# Patient Record
Sex: FEMALE | Race: WHITE | NOT HISPANIC OR LATINO | Employment: UNEMPLOYED
[De-identification: names, ages, dates, MRNs, and addresses within clinical notes are randomized per-mention and may not be internally consistent; named-entity substitution may affect disease eponyms.]

---

## 2023-12-26 ENCOUNTER — TRANSCRIBE ORDERS (OUTPATIENT)
Dept: OTHER | Age: 63
End: 2023-12-26

## 2023-12-26 ENCOUNTER — MEDICAL CORRESPONDENCE (OUTPATIENT)
Dept: HEALTH INFORMATION MANAGEMENT | Facility: CLINIC | Age: 63
End: 2023-12-26

## 2023-12-26 ENCOUNTER — PATIENT OUTREACH (OUTPATIENT)
Dept: ONCOLOGY | Facility: CLINIC | Age: 63
End: 2023-12-26

## 2023-12-26 DIAGNOSIS — R25.0 ABNORMAL HEAD MOVEMENTS: ICD-10-CM

## 2023-12-26 DIAGNOSIS — R26.2 AMBULATORY DYSFUNCTION: ICD-10-CM

## 2023-12-26 DIAGNOSIS — C43.59: Primary | ICD-10-CM

## 2023-12-26 DIAGNOSIS — R42 LIGHTHEADEDNESS: ICD-10-CM

## 2023-12-26 DIAGNOSIS — R63.4 WEIGHT LOSS: ICD-10-CM

## 2023-12-26 DIAGNOSIS — R25.1 TREMOR: ICD-10-CM

## 2023-12-26 DIAGNOSIS — H51.9 ABNORMAL EYE MOVEMENTS: ICD-10-CM

## 2023-12-26 NOTE — PROGRESS NOTES
"New Patient Hematology / Oncology Nurse Navigator Note     Referral Date: 12/26/23    Referring provider:   Yenny Delcid DO   [address] [address]   Phone: [phone]   Fax: [phone]     Referring Clinic/Organization: [facility]      Referred to: Neuro Oncology    Requested provider (if applicable): Dr. Shaver    Evaluation for : \"h/o malignant melanoma almost completing 1 year course of Opdivo; developed vertigo, dizziness, abnormal eye and head movements, tremor, ambulatory dysfunction, sx continue despite cessation of Opdivo, w/u nonspecific for etiology, concern for immune-related adverse effect of immunotherapy\"     Clinical History (per Nurse review of records provided):    12/22/23 Office Visit with Neurology at [facility] (referring) -- BOOKMARKED  10/12/23 MRI Brain:  FINDINGS: No acute infarct, hemorrhage, intracranial mass, or abnormal enhancement. Incidental benign left cerebellar DVA. Trace leukoaraiosis. No findings to explain double vision or dizziness.   IMPRESSION: No intracranial metastases.  -- BOOKMARKED  12/14/23 LP op note/results -- BOOKMARKED    Clinical Assessment / Barriers to Care (Per Nurse):  Pt lives in [facility]     Records Location: Care Everywhere     Records Needed:   Records from [facility] per protocol    Additional testing needed prior to consult:   N/A    Referral updates and Plan:   Will review referral with Dr. Shaver to advise if most appropriate for Neuro Onc, Neurology, or Medical Oncology. Will follow-up pending input from Dr. Shaver.     OUTGOING CALL to pt, no answer.     LVM introducing my role as nurse navigator with [facility] and that we have recd the referral from [facility] requesting 2nd opinion.     Explained to pt that Nurse Navigator will review referral with the team then follow-up with further recommendations. Provided my direct call-back number if any questions in the meantime.        Anne Hall, BSN, RN, PHN, " OCN  Hematology/Oncology Nurse Navigator  [facility]  [phone]

## 2023-12-27 ENCOUNTER — PRE VISIT (OUTPATIENT)
Dept: ONCOLOGY | Facility: CLINIC | Age: 63
End: 2023-12-27

## 2023-12-27 NOTE — TELEPHONE ENCOUNTER
RECORDS STATUS - ALL OTHER DIAGNOSIS      Action    Action Taken 23  -LVM for pt re: [facility]Care Everywhere Authorization.  -Records from [facility] requested via fax.  9:53 AM    Pt called back & advised they have never been to [facility]. Pt advised that they were referred to [facility] at the same time, and [facility] contacted her first/got her scheduled, [facility] never contacted pt.     Pt provided verbal authorization for CE pull. Pt advised that all of her imaging, procedures, treatments, and records are at [facility].  10:03 AM    Imaging from [facility] resolved to PACS  3:36 PM       RECORDS RECEIVED FROM: [facility]   DATE RECEIVED:    NOTES STATUS DETAILS   OFFICE NOTE from referring provider [facility] Dr. Kailey Davis   OFFICE NOTE from medical oncologist [facility] Dr. Davis: 23   DISCHARGE SUMMARY from hospital [facility] 10/11/22, 9/10/22   DISCHARGE REPORT from the ER [facility] 23, 10/9/23, 23, 23   OPERATIVE REPORT [facility] 23: Puncture Lumbar  22: Excision Wide Melanoma   MEDICATION LIST [facility]   LABS     PATHOLOGY REPORTS [facility], Reports in CE, slides requested   [employer] 22: [id number]  6/3/22: [id number]   ANYTHING RELATED TO DIAGNOSIS Epic/CE 23   IMAGING (NEED IMAGES & REPORT)     CT SCANS PACS 10/9/23: [facility]   MRI PACS 10/12/23: [facility]   MRI PACS 23, 23, 10/14/22: [facility]   ULTRASOUND PACS 10/13/22: [facility]   CT PACS 23, 23, 3/6/23, 22, 10/14/22, 10/13/22, 10/11/22, 9/10/22: [facility]   PET PACS 7/15/22: [facility]

## 2023-12-28 NOTE — PROGRESS NOTES
Reviewed with Dr. Shaver who is happy to see patient.     OUTGOING CALL to pt:  Introduced my role as nurse navigator with [facility] Hematology/Oncology dept and that we have recd the referral to Dr. Shaver from from Dr Delcid.  Pt confirms they are aware of the referral and ready to schedule.  Provided contact information if future questions arise.     -Transferred pt to NPS line [phone] to schedule appt per scheduling instructions.    Anne Hall, ROULAN, RN, PHN, OCN  Hematology/Oncology Nurse Navigator  [facility]  [phone]

## 2024-01-08 ENCOUNTER — TUMOR CONFERENCE (OUTPATIENT)
Dept: ONCOLOGY | Facility: CLINIC | Age: 64
End: 2024-01-08
Payer: COMMERCIAL

## 2024-01-09 ENCOUNTER — APPOINTMENT (OUTPATIENT)
Dept: CT IMAGING | Facility: CLINIC | Age: 64
End: 2024-01-09
Attending: EMERGENCY MEDICINE
Payer: COMMERCIAL

## 2024-01-09 ENCOUNTER — ONCOLOGY VISIT (OUTPATIENT)
Dept: ONCOLOGY | Facility: CLINIC | Age: 64
End: 2024-01-09
Attending: PSYCHIATRY & NEUROLOGY
Payer: COMMERCIAL

## 2024-01-09 ENCOUNTER — APPOINTMENT (OUTPATIENT)
Dept: MRI IMAGING | Facility: CLINIC | Age: 64
End: 2024-01-09
Payer: COMMERCIAL

## 2024-01-09 ENCOUNTER — APPOINTMENT (OUTPATIENT)
Dept: GENERAL RADIOLOGY | Facility: CLINIC | Age: 64
End: 2024-01-09
Attending: EMERGENCY MEDICINE
Payer: COMMERCIAL

## 2024-01-09 ENCOUNTER — PATIENT OUTREACH (OUTPATIENT)
Dept: ONCOLOGY | Facility: CLINIC | Age: 64
End: 2024-01-09
Payer: COMMERCIAL

## 2024-01-09 ENCOUNTER — HOSPITAL ENCOUNTER (INPATIENT)
Facility: CLINIC | Age: 64
LOS: 9 days | Discharge: NURSING, REHAB OR LONG-TERM CARE | End: 2024-01-18
Attending: EMERGENCY MEDICINE | Admitting: STUDENT IN AN ORGANIZED HEALTH CARE EDUCATION/TRAINING PROGRAM
Payer: COMMERCIAL

## 2024-01-09 VITALS
OXYGEN SATURATION: 98 % | SYSTOLIC BLOOD PRESSURE: 145 MMHG | RESPIRATION RATE: 16 BRPM | TEMPERATURE: 98.4 F | WEIGHT: 104 LBS | DIASTOLIC BLOOD PRESSURE: 96 MMHG | HEART RATE: 119 BPM

## 2024-01-09 DIAGNOSIS — G25.9 MOVEMENT DISORDER: ICD-10-CM

## 2024-01-09 DIAGNOSIS — R53.1 GENERALIZED WEAKNESS: ICD-10-CM

## 2024-01-09 DIAGNOSIS — Z29.89 NEED FOR PNEUMOCYSTIS PROPHYLAXIS: ICD-10-CM

## 2024-01-09 DIAGNOSIS — R25.1 SHAKING: ICD-10-CM

## 2024-01-09 DIAGNOSIS — M62.89 MUSCLE STIFFNESS: ICD-10-CM

## 2024-01-09 DIAGNOSIS — R26.2 UNABLE TO AMBULATE: ICD-10-CM

## 2024-01-09 DIAGNOSIS — Z29.89 OSTEOPOROSIS PROPHYLAXIS: ICD-10-CM

## 2024-01-09 DIAGNOSIS — F41.9 ANXIETY: Primary | ICD-10-CM

## 2024-01-09 DIAGNOSIS — G25.9 MOVEMENT DISORDER: Primary | ICD-10-CM

## 2024-01-09 PROBLEM — R42 VERTIGO: Status: ACTIVE | Noted: 2024-01-09

## 2024-01-09 LAB
ALBUMIN SERPL BCG-MCNC: 4.5 G/DL (ref 3.5–5.2)
ALBUMIN UR-MCNC: NEGATIVE MG/DL
ALP SERPL-CCNC: 49 U/L (ref 40–150)
ALT SERPL W P-5'-P-CCNC: 13 U/L (ref 0–50)
ANION GAP SERPL CALCULATED.3IONS-SCNC: 14 MMOL/L (ref 7–15)
APPEARANCE UR: CLEAR
AST SERPL W P-5'-P-CCNC: 14 U/L (ref 0–45)
ATRIAL RATE - MUSE: 108 BPM
BACTERIA #/AREA URNS HPF: ABNORMAL /HPF
BASOPHILS # BLD AUTO: 0 10E3/UL (ref 0–0.2)
BASOPHILS NFR BLD AUTO: 0 %
BILIRUB SERPL-MCNC: 0.3 MG/DL
BILIRUB UR QL STRIP: NEGATIVE
BUN SERPL-MCNC: 11.2 MG/DL (ref 8–23)
CALCIUM SERPL-MCNC: 9.6 MG/DL (ref 8.8–10.2)
CHLORIDE SERPL-SCNC: 105 MMOL/L (ref 98–107)
COLOR UR AUTO: ABNORMAL
CREAT SERPL-MCNC: 0.92 MG/DL (ref 0.51–0.95)
DEPRECATED HCO3 PLAS-SCNC: 19 MMOL/L (ref 22–29)
DIASTOLIC BLOOD PRESSURE - MUSE: NORMAL MMHG
EGFRCR SERPLBLD CKD-EPI 2021: 70 ML/MIN/1.73M2
EOSINOPHIL # BLD AUTO: 0.1 10E3/UL (ref 0–0.7)
EOSINOPHIL NFR BLD AUTO: 1 %
ERYTHROCYTE [DISTWIDTH] IN BLOOD BY AUTOMATED COUNT: 13.1 % (ref 10–15)
FLUAV RNA SPEC QL NAA+PROBE: NEGATIVE
FLUBV RNA RESP QL NAA+PROBE: NEGATIVE
GLUCOSE BLDC GLUCOMTR-MCNC: 106 MG/DL (ref 70–99)
GLUCOSE SERPL-MCNC: 102 MG/DL (ref 70–99)
GLUCOSE UR STRIP-MCNC: NEGATIVE MG/DL
HCT VFR BLD AUTO: 37.2 % (ref 35–47)
HGB BLD-MCNC: 12.6 G/DL (ref 11.7–15.7)
HGB UR QL STRIP: NEGATIVE
HOLD SPECIMEN: NORMAL
IMM GRANULOCYTES # BLD: 0.1 10E3/UL
IMM GRANULOCYTES NFR BLD: 1 %
INTERPRETATION ECG - MUSE: NORMAL
KETONES UR STRIP-MCNC: NEGATIVE MG/DL
LACTATE SERPL-SCNC: 2.2 MMOL/L (ref 0.7–2)
LACTATE SERPL-SCNC: 4.1 MMOL/L (ref 0.7–2)
LEUKOCYTE ESTERASE UR QL STRIP: NEGATIVE
LYMPHOCYTES # BLD AUTO: 2.2 10E3/UL (ref 0.8–5.3)
LYMPHOCYTES NFR BLD AUTO: 18 %
MAGNESIUM SERPL-MCNC: 2.2 MG/DL (ref 1.7–2.3)
MCH RBC QN AUTO: 31 PG (ref 26.5–33)
MCHC RBC AUTO-ENTMCNC: 33.9 G/DL (ref 31.5–36.5)
MCV RBC AUTO: 92 FL (ref 78–100)
MONOCYTES # BLD AUTO: 0.9 10E3/UL (ref 0–1.3)
MONOCYTES NFR BLD AUTO: 7 %
MUCOUS THREADS #/AREA URNS LPF: PRESENT /LPF
NEUTROPHILS # BLD AUTO: 8.8 10E3/UL (ref 1.6–8.3)
NEUTROPHILS NFR BLD AUTO: 73 %
NITRATE UR QL: NEGATIVE
NRBC # BLD AUTO: 0 10E3/UL
NRBC BLD AUTO-RTO: 0 /100
P AXIS - MUSE: 85 DEGREES
PH UR STRIP: 6.5 [PH] (ref 5–7)
PLATELET # BLD AUTO: 353 10E3/UL (ref 150–450)
POTASSIUM SERPL-SCNC: 3.5 MMOL/L (ref 3.4–5.3)
PR INTERVAL - MUSE: 150 MS
PROT SERPL-MCNC: 7.2 G/DL (ref 6.4–8.3)
QRS DURATION - MUSE: 70 MS
QT - MUSE: 344 MS
QTC - MUSE: 460 MS
R AXIS - MUSE: 80 DEGREES
RBC # BLD AUTO: 4.06 10E6/UL (ref 3.8–5.2)
RBC URINE: 1 /HPF
RSV RNA SPEC NAA+PROBE: NEGATIVE
SARS-COV-2 RNA RESP QL NAA+PROBE: NEGATIVE
SODIUM SERPL-SCNC: 138 MMOL/L (ref 135–145)
SP GR UR STRIP: 1.01 (ref 1–1.03)
SQUAMOUS EPITHELIAL: <1 /HPF
SYSTOLIC BLOOD PRESSURE - MUSE: NORMAL MMHG
T AXIS - MUSE: 61 DEGREES
TRANSITIONAL EPI: <1 /HPF
TROPONIN T SERPL HS-MCNC: 8 NG/L
TSH SERPL DL<=0.005 MIU/L-ACNC: 3.34 UIU/ML (ref 0.3–4.2)
UROBILINOGEN UR STRIP-MCNC: NORMAL MG/DL
VENTRICULAR RATE- MUSE: 108 BPM
WBC # BLD AUTO: 12.1 10E3/UL (ref 4–11)
WBC URINE: 1 /HPF

## 2024-01-09 PROCEDURE — 93005 ELECTROCARDIOGRAM TRACING: CPT | Performed by: EMERGENCY MEDICINE

## 2024-01-09 PROCEDURE — 99223 1ST HOSP IP/OBS HIGH 75: CPT | Mod: GC | Performed by: STUDENT IN AN ORGANIZED HEALTH CARE EDUCATION/TRAINING PROGRAM

## 2024-01-09 PROCEDURE — 99213 OFFICE O/P EST LOW 20 MIN: CPT | Performed by: PSYCHIATRY & NEUROLOGY

## 2024-01-09 PROCEDURE — 85025 COMPLETE CBC W/AUTO DIFF WBC: CPT | Performed by: EMERGENCY MEDICINE

## 2024-01-09 PROCEDURE — 81001 URINALYSIS AUTO W/SCOPE: CPT | Performed by: EMERGENCY MEDICINE

## 2024-01-09 PROCEDURE — 70450 CT HEAD/BRAIN W/O DYE: CPT

## 2024-01-09 PROCEDURE — 250N000012 HC RX MED GY IP 250 OP 636 PS 637

## 2024-01-09 PROCEDURE — 36415 COLL VENOUS BLD VENIPUNCTURE: CPT | Performed by: EMERGENCY MEDICINE

## 2024-01-09 PROCEDURE — 99205 OFFICE O/P NEW HI 60 MIN: CPT | Performed by: PSYCHIATRY & NEUROLOGY

## 2024-01-09 PROCEDURE — 82962 GLUCOSE BLOOD TEST: CPT

## 2024-01-09 PROCEDURE — 84484 ASSAY OF TROPONIN QUANT: CPT | Performed by: EMERGENCY MEDICINE

## 2024-01-09 PROCEDURE — 250N000013 HC RX MED GY IP 250 OP 250 PS 637

## 2024-01-09 PROCEDURE — 80053 COMPREHEN METABOLIC PANEL: CPT | Performed by: EMERGENCY MEDICINE

## 2024-01-09 PROCEDURE — 120N000002 HC R&B MED SURG/OB UMMC

## 2024-01-09 PROCEDURE — G0463 HOSPITAL OUTPT CLINIC VISIT: HCPCS | Performed by: PSYCHIATRY & NEUROLOGY

## 2024-01-09 PROCEDURE — 99417 PROLNG OP E/M EACH 15 MIN: CPT | Performed by: PSYCHIATRY & NEUROLOGY

## 2024-01-09 PROCEDURE — 93010 ELECTROCARDIOGRAM REPORT: CPT | Performed by: EMERGENCY MEDICINE

## 2024-01-09 PROCEDURE — A9585 GADOBUTROL INJECTION: HCPCS | Performed by: EMERGENCY MEDICINE

## 2024-01-09 PROCEDURE — 70450 CT HEAD/BRAIN W/O DYE: CPT | Mod: 26 | Performed by: RADIOLOGY

## 2024-01-09 PROCEDURE — 70553 MRI BRAIN STEM W/O & W/DYE: CPT

## 2024-01-09 PROCEDURE — 255N000002 HC RX 255 OP 636: Performed by: EMERGENCY MEDICINE

## 2024-01-09 PROCEDURE — 71046 X-RAY EXAM CHEST 2 VIEWS: CPT | Mod: 26 | Performed by: RADIOLOGY

## 2024-01-09 PROCEDURE — 83735 ASSAY OF MAGNESIUM: CPT | Performed by: EMERGENCY MEDICINE

## 2024-01-09 PROCEDURE — 87637 SARSCOV2&INF A&B&RSV AMP PRB: CPT | Performed by: EMERGENCY MEDICINE

## 2024-01-09 PROCEDURE — 99285 EMERGENCY DEPT VISIT HI MDM: CPT | Mod: 25 | Performed by: EMERGENCY MEDICINE

## 2024-01-09 PROCEDURE — 84443 ASSAY THYROID STIM HORMONE: CPT | Performed by: EMERGENCY MEDICINE

## 2024-01-09 PROCEDURE — 72156 MRI NECK SPINE W/O & W/DYE: CPT | Mod: 26 | Performed by: RADIOLOGY

## 2024-01-09 PROCEDURE — 83605 ASSAY OF LACTIC ACID: CPT | Performed by: EMERGENCY MEDICINE

## 2024-01-09 PROCEDURE — 70553 MRI BRAIN STEM W/O & W/DYE: CPT | Mod: 26 | Performed by: RADIOLOGY

## 2024-01-09 PROCEDURE — 72156 MRI NECK SPINE W/O & W/DYE: CPT

## 2024-01-09 PROCEDURE — 71046 X-RAY EXAM CHEST 2 VIEWS: CPT

## 2024-01-09 PROCEDURE — 258N000003 HC RX IP 258 OP 636

## 2024-01-09 RX ORDER — AMOXICILLIN 250 MG
2 CAPSULE ORAL 2 TIMES DAILY PRN
Status: DISCONTINUED | OUTPATIENT
Start: 2024-01-09 | End: 2024-01-13

## 2024-01-09 RX ORDER — LIDOCAINE 40 MG/G
CREAM TOPICAL
Status: DISCONTINUED | OUTPATIENT
Start: 2024-01-09 | End: 2024-01-18 | Stop reason: HOSPADM

## 2024-01-09 RX ORDER — MECLIZINE HYDROCHLORIDE 25 MG/1
25 TABLET ORAL 2 TIMES DAILY PRN
Status: ON HOLD | COMMUNITY
Start: 2023-08-09 | End: 2024-01-22

## 2024-01-09 RX ORDER — PANTOPRAZOLE SODIUM 40 MG/1
40 TABLET, DELAYED RELEASE ORAL
Status: DISCONTINUED | OUTPATIENT
Start: 2024-01-10 | End: 2024-01-18 | Stop reason: HOSPADM

## 2024-01-09 RX ORDER — LIDOCAINE/PRILOCAINE 2.5 %-2.5%
CREAM (GRAM) TOPICAL
Status: ON HOLD | COMMUNITY
Start: 2022-12-08 | End: 2024-01-16

## 2024-01-09 RX ORDER — PROCHLORPERAZINE MALEATE 5 MG
10 TABLET ORAL EVERY 6 HOURS PRN
Status: DISCONTINUED | OUTPATIENT
Start: 2024-01-09 | End: 2024-01-18 | Stop reason: HOSPADM

## 2024-01-09 RX ORDER — LEVOTHYROXINE SODIUM 50 UG/1
1 TABLET ORAL DAILY
Status: ON HOLD | COMMUNITY
Start: 2023-07-06 | End: 2024-01-22

## 2024-01-09 RX ORDER — ONDANSETRON 4 MG/1
8 TABLET, ORALLY DISINTEGRATING ORAL EVERY 8 HOURS PRN
Status: ON HOLD | COMMUNITY
Start: 2023-09-14 | End: 2024-01-22

## 2024-01-09 RX ORDER — CITALOPRAM HYDROBROMIDE 20 MG/1
40 TABLET ORAL DAILY
Status: DISCONTINUED | OUTPATIENT
Start: 2024-01-09 | End: 2024-01-14

## 2024-01-09 RX ORDER — POTASSIUM CHLORIDE 750 MG/1
10 TABLET, EXTENDED RELEASE ORAL 2 TIMES DAILY
Status: ON HOLD | COMMUNITY
Start: 2023-02-16 | End: 2024-01-25

## 2024-01-09 RX ORDER — ONDANSETRON 4 MG/1
4 TABLET, ORALLY DISINTEGRATING ORAL EVERY 8 HOURS PRN
Status: DISCONTINUED | OUTPATIENT
Start: 2024-01-09 | End: 2024-01-18 | Stop reason: HOSPADM

## 2024-01-09 RX ORDER — METOCLOPRAMIDE 10 MG/1
10 TABLET ORAL EVERY 6 HOURS PRN
Status: ON HOLD | COMMUNITY
Start: 2023-10-09 | End: 2024-01-22

## 2024-01-09 RX ORDER — GABAPENTIN 300 MG/1
300 CAPSULE ORAL AT BEDTIME
Status: DISCONTINUED | OUTPATIENT
Start: 2024-01-09 | End: 2024-01-18 | Stop reason: HOSPADM

## 2024-01-09 RX ORDER — ACETAMINOPHEN 325 MG/1
975 TABLET ORAL 2 TIMES DAILY PRN
Status: DISCONTINUED | OUTPATIENT
Start: 2024-01-09 | End: 2024-01-16

## 2024-01-09 RX ORDER — MECLIZINE HYDROCHLORIDE 25 MG/1
25 TABLET ORAL 2 TIMES DAILY PRN
Status: DISCONTINUED | OUTPATIENT
Start: 2024-01-09 | End: 2024-01-18 | Stop reason: HOSPADM

## 2024-01-09 RX ORDER — GABAPENTIN 100 MG/1
300 CAPSULE ORAL AT BEDTIME
Status: ON HOLD | COMMUNITY
Start: 2023-12-22 | End: 2024-01-16

## 2024-01-09 RX ORDER — FOLIC ACID 1 MG/1
1 TABLET ORAL DAILY
Status: ON HOLD | COMMUNITY
Start: 2023-11-14 | End: 2024-01-22

## 2024-01-09 RX ORDER — DIAZEPAM 5 MG
5 TABLET ORAL EVERY 8 HOURS PRN
COMMUNITY
Start: 2023-08-09 | End: 2024-01-10

## 2024-01-09 RX ORDER — PREDNISONE 10 MG/1
1 TABLET ORAL DAILY
COMMUNITY
Start: 2023-09-05 | End: 2024-01-09

## 2024-01-09 RX ORDER — CALCIUM CARBONATE 500 MG/1
1000 TABLET, CHEWABLE ORAL 4 TIMES DAILY PRN
Status: DISCONTINUED | OUTPATIENT
Start: 2024-01-09 | End: 2024-01-18 | Stop reason: HOSPADM

## 2024-01-09 RX ORDER — ACETAMINOPHEN 500 MG
500-1000 TABLET ORAL EVERY 6 HOURS PRN
COMMUNITY

## 2024-01-09 RX ORDER — PROCHLORPERAZINE MALEATE 10 MG
10 TABLET ORAL EVERY 6 HOURS PRN
Status: ON HOLD | COMMUNITY
Start: 2023-08-01 | End: 2024-01-16

## 2024-01-09 RX ORDER — PREDNISONE 5 MG/1
5 TABLET ORAL EVERY MORNING
Status: DISCONTINUED | OUTPATIENT
Start: 2024-01-09 | End: 2024-01-18

## 2024-01-09 RX ORDER — METOCLOPRAMIDE 5 MG/1
10 TABLET ORAL EVERY 6 HOURS PRN
Status: DISCONTINUED | OUTPATIENT
Start: 2024-01-09 | End: 2024-01-18 | Stop reason: HOSPADM

## 2024-01-09 RX ORDER — GADOBUTROL 604.72 MG/ML
7.5 INJECTION INTRAVENOUS ONCE
Status: COMPLETED | OUTPATIENT
Start: 2024-01-09 | End: 2024-01-09

## 2024-01-09 RX ORDER — LEVOTHYROXINE SODIUM 50 UG/1
50 TABLET ORAL DAILY
Status: DISCONTINUED | OUTPATIENT
Start: 2024-01-09 | End: 2024-01-18 | Stop reason: HOSPADM

## 2024-01-09 RX ORDER — CITALOPRAM HYDROBROMIDE 40 MG/1
1 TABLET ORAL DAILY
Status: ON HOLD | COMMUNITY
Start: 2023-07-24 | End: 2024-01-16

## 2024-01-09 RX ORDER — PREDNISONE 5 MG/1
1 TABLET ORAL EVERY MORNING
Status: ON HOLD | COMMUNITY
Start: 2023-02-28 | End: 2024-01-17

## 2024-01-09 RX ORDER — AMOXICILLIN 250 MG
1 CAPSULE ORAL 2 TIMES DAILY PRN
Status: DISCONTINUED | OUTPATIENT
Start: 2024-01-09 | End: 2024-01-13

## 2024-01-09 RX ADMIN — PREDNISONE 5 MG: 5 TABLET ORAL at 22:51

## 2024-01-09 RX ADMIN — GABAPENTIN 300 MG: 300 CAPSULE ORAL at 22:51

## 2024-01-09 RX ADMIN — ACETAMINOPHEN 975 MG: 325 TABLET, FILM COATED ORAL at 18:13

## 2024-01-09 RX ADMIN — GADOBUTROL 5 ML: 604.72 INJECTION INTRAVENOUS at 21:15

## 2024-01-09 RX ADMIN — CITALOPRAM 40 MG: 40 TABLET ORAL at 22:51

## 2024-01-09 RX ADMIN — LEVOTHYROXINE SODIUM 50 MCG: 0.05 TABLET ORAL at 22:52

## 2024-01-09 RX ADMIN — SODIUM CHLORIDE, POTASSIUM CHLORIDE, SODIUM LACTATE AND CALCIUM CHLORIDE 1000 ML: 600; 310; 30; 20 INJECTION, SOLUTION INTRAVENOUS at 22:52

## 2024-01-09 ASSESSMENT — ACTIVITIES OF DAILY LIVING (ADL)
ADLS_ACUITY_SCORE: 35

## 2024-01-09 ASSESSMENT — PAIN SCALES - GENERAL: PAINLEVEL: SEVERE PAIN (6)

## 2024-01-09 NOTE — PROGRESS NOTES
[facility]: Cancer Care                                                                                          Patient was seen in the clinic today by Dr. Shaver who referred them to the ER dept at the [facility] to be examined by general neuro whom is expecting her.   She is accompanied by her significant other, Kyle.   She is w/c bound.     [facility] notified of patient's arrival    Signature:  Claribel Jauregui RN

## 2024-01-09 NOTE — LETTER
1/9/2024         RE: Shayy Cortez  [address]  [place] [address]        Dear Colleague,    Thank you for referring your patient, Shayy Cortez, to the [facility]. Please see a copy of my visit note below.    Oncology Rooming Note    January 9, 2024 10:23 AM   Shayy Cortez is a 63 year old female who presents for:    Chief Complaint   Patient presents with     Oncology Clinic Visit     Initial Vitals: BP (!) 155/99 (BP Location: Left arm, Patient Position: Sitting, Cuff Size: Adult Regular)   Pulse (!) 123   Temp 98.4  F (36.9  C) (Oral)   Resp 16   Wt 47.2 kg (104 lb)   SpO2 98%  There is no height or weight on file to calculate BMI. There is no height or weight on file to calculate BSA.  Severe Pain (6) Comment: Data Unavailable   No LMP recorded.  Allergies reviewed: Yes  Medications reviewed: Yes    Medications: Medication refills not needed today.  Pharmacy name entered into EPIC: Data Unavailable    Frailty Screening:   Is the patient here for a new oncology consult visit in cancer care? 1. Yes. Over the past month, have you experienced difficulty or required a caregiver to assist with:   1. Balance, walking or general mobility (including any falls)? YES  2. Completion of self-care tasks such as bathing, dressing, toileting, grooming/hygiene?  YES  3. Concentration or memory that affects your daily life?  YES       Clinical concerns: no        Lizz Reynoso CMA                NEURO-ONCOLOGY INITIAL VISIT  Jan 9, 2024    CHIEF COMPLAINT: Ms. Shayy Cortez is a 63 year old right-handed woman with melanoma, who completed a year's treatment with nivolumab and over the course of the past 3-5 months has developed progressive tremor, vertigo, abnormal eye movements, and ambulatory dysfunction. These symptoms continued despite stopping immunotherapy plus use of steroids. She is presenting to this initial clinic visit for evaluation and is accompanied by Ever  (significant other).     HISTORY OF PRESENT ILLNESS  -In October 2023, endorsed for several month history of headache, dizziness, and gait instability with new issues related to vision changes.    Worsening dizziness with recurrence of vertigo in the last 2-3 weeks.    Worsening tremor in the arms and legs plus whole body jerks.    Square wave jerks of the eyes with reduced visual acuity.    Legs feel stiff and weak. Previously was walking with a cane, then a walker, and now, she can no longer walk due to balance instability, leg stiffening, and the intensity of tremor.    -Headaches occur several times a week. Can be located around bilateral temporal areas and occipital areas. Severity rating 4/10. Described dull, irritating pain. Complains of neck pain often. Tylenol does give relief.    -She endorses mild confusion. Denies dysarthria.    -Reported constipation. Usually takes stool softener. Has tried senna or miralax without relief.    Denies urinary/bowel incontinence.    Has intermittent nausea. No dysphagia.  -With prednisone + gabapentin there has been no improvement.  -New onset of acting out her dreams.    No other periods of movement of arms without her knowledge.       MEDICATIONS   Current Outpatient Medications   Medication Sig Dispense Refill     cholecalciferol 50 MCG (2000 UT) CAPS Take 50 mcg by mouth       citalopram (CELEXA) 40 MG tablet Take 1 tablet by mouth daily       diazepam (VALIUM) 5 MG tablet Take 5 mg by mouth       folic acid (FOLVITE) 1 MG tablet Take 1 tablet by mouth daily       gabapentin (NEURONTIN) 100 MG capsule Take 300 mg by mouth       levothyroxine (SYNTHROID/LEVOTHROID) 50 MCG tablet Take 1 tablet by mouth daily       lidocaine-prilocaine (EMLA) 2.5-2.5 % external cream Apply to port area 1-2 hours prior to needle access. Cover with occlusive dressing unit procedure.       meclizine (ANTIVERT) 25 MG tablet Take 25 mg by mouth       metoclopramide (REGLAN) 10 MG tablet Take  10 mg by mouth       omeprazole (PRILOSEC) 20 MG DR capsule Take 20 mg by mouth       ondansetron (ZOFRAN ODT) 4 MG ODT tab Place 8 mg under the tongue       potassium chloride ER (K-TAB/KLOR-CON) 10 MEQ CR tablet Take 10 mEq by mouth       predniSONE (DELTASONE) 10 MG tablet Take 1 tablet by mouth daily       predniSONE (DELTASONE) 5 MG tablet Take 1 tablet by mouth every morning       prochlorperazine (COMPAZINE) 10 MG tablet Take 10 mg by mouth       DRUG ALLERGIES No Known Allergies      IMMUNIZATIONS   Immunization History   Administered Date(s) Administered     COVID-19 MONOVALENT 12+ (Pf[employer]) 04/23/2021, 05/12/2021       PHYSICAL EXAMINATION  BP (!) 145/96 (BP Location: Right arm, Patient Position: Sitting, Cuff Size: Adult Regular)   Pulse 119   Temp 98.4  F (36.9  C) (Oral)   Resp 16   Wt 47.2 kg (104 lb)   SpO2 98%   Wt Readings from Last 2 Encounters:   01/09/24 47.2 kg (104 lb)      Ht Readings from Last 2 Encounters:   No data found for Ht     KPS: 50    -No is acute distress. Fatigued. Thin.   -Respiratory: Normal breath sounds, no audible wheezing.   -Hematologic/ lymphatic: No abnormal bruising. No leg swelling.  -Psychiatric: Normal mood and affect. Pleasant, though discouraged with current medical situation.  -Neurologic:   MENTAL STATUS:     Alert, oriented.    Recall: Intact    Speech fluent.   Comprehension intact to multi-step commands.   CRANIAL NERVES:     Pupils are equal, round.     Extraocular movements full, however, pursuit is interrupted by square wave jerks.     Visual fields full.     Facial sensation intact to light touch.   Symmetric facial movements.   Hearing intact.   Mild dysarthria, cerebellar/ scanning. Hypophonia.   MOTOR:    Strength is 4+ to 5/5 throughout.    Severe resting and postural tremor in arms, legs, head, trunk.     Full body myoclonic jerks.  Mild increase in tone; mild cogwheeling.    Very bradykinetic on finger tapping.   REFLEX: Startle reflex.    Triple  flexion at the patella.    Hyperreflexic throughout. No sustained clonus.   SENSATION: Intact to light touch throughout.  COORDINATION: Difficulty with finger-nose with eyes open due dysmetria and end-point tremor.   GAIT:  Stands with full assist.   Gait apraxia.   Wide-based stance.   Leg stiffness with standing.        MEDICAL RECORDS  Personally reviewed hospitalization records from neurology and oncology clinic visits.    LABS  Personally reviewed all available lab results;  CSF studies showed 33 nucleated cells with 84% lymphocytes, protein 78, 2 CSF oligoclonal bands and kappa free light chain elevation at 0.1500. Vitamin B12, folate, lyme western blot, CRP, TRAN, ANCA, serum/CSF paraneoplastic panel, myasthenia/lambert eaton panel, MUSK ab, SPEP, thiamine, niacin, vitamin E, homocysteine, MMA, SSA/SSB, ACE, copper level, GQ1B ab, treponema ab, CSF glucose, CSF culture, CSF cytology, CSF flow cytometry, CSF VDRL, CSF lyme, CSF meningoencephalitis panel, and CSF ACE levels were either negative or wthin normal range.      IMAGING  Personally reviewed; MR brain imaging with an incidental benign left cerebellar DVA, mild generalized cerebral and cerebellar volume loss, presumed small vessel disease. There was a focus of nonenhancing T2 signal abnormality small in nature anterior left temporal lobe either represents chronic ischemia versus posttraumatic.     To my eye, there is no clear atrophy of the midbrain;       Questionable atrophy of the dajuan as seen in MSA;       CTA head/neck was negative for significant stenosis, occlusion, or dissection. There may be a small outpouching around left ICA suspicious for tiny aneurysm.          IMPRESSION  Preparatory, clinic, and post-visit time of 90 minutes was spent for this high complexity encounter discussing in detail her concerns and symptoms, answering questions pertaining to my recommendations, and devising the plan as outlined below. Prior to Shayy's appointment  today, I have reviewed her her chart, imaging, and pathology results. I also reviewed her imaging and case reviewed at Brain Tumor Conference on 1/8 and no overt imaging abnormalities were appreciated.    Over the course of a short period, Shayy has noted a fairly rapid and progressive worsening in gait instability, now not being able to walk due to leg stiffness and dyskinsia/ apraxia, plus a worsening of her tremor, now with whole body myoclonic jerks. She also endorses a new onset of REM behavioral sleep disorder. To add to this history and as seen on examination, she has notable square wave jerks with movement of the eyes, bradykinesia, and hyperreflexia. While I have concern for a Parkinsonism subtype of MSA or PSP, her imaging does not fully support this as shown above. Prior CSF testing with noting overtly revealing aside from unexplained inflammation. Nivolumab penetrates into the CSF and can be associated with lingering inflammatory effect. However, given the time period of stopping nivolumab and use of steroids with only further worsening of symptoms, I have limited concern for Shayy's current clinical status being a long term sequela of immunotherapy toxicity.     Shayy is not safe at home due to her inability to ambulate. I have spoken with the [facility] neurology attending on-service, Dr. Shyam Gardiner to discuss her case. As a means to potentially repeat CSF evaluation, potentially have a consultation with a movement disorder colleague, potentially start and titrate dopaminergic treatment to identify any symptomatic response, and to ensure a safe discharge with either placement or in-home services, the recommendation was for Shayy to present to the ED at [facility]. Shayy and Jorge are in agreement with this plan and [facility] called ahead to notify ED staff of need for neurology consultation upon presenting.     Amena Shaver MD  Neuro-oncology      Again, thank you for allowing me to participate in the care of  your patient.        Sincerely,        Amena Shaver MD

## 2024-01-09 NOTE — ED TRIAGE NOTES
Patient presents to triage for evaluation of shaking and inability to walk. Patient is tremulous in triage, and is not able to walk without assistance. Patient also endorses dizziness, denies n/v currently. Patient was referred to ED by neurology for admission and further workup.      Triage Assessment (Adult)       Row Name 01/09/24 1418          Triage Assessment    Airway WDL WDL        Respiratory WDL    Respiratory WDL WDL        Skin Circulation/Temperature WDL    Skin Circulation/Temperature WDL WDL        Cardiac WDL    Cardiac WDL WDL        Peripheral/Neurovascular WDL    Peripheral Neurovascular WDL WDL        Cognitive/Neuro/Behavioral WDL    Cognitive/Neuro/Behavioral WDL WDL

## 2024-01-09 NOTE — ED PROVIDER NOTES
History     Chief Complaint   Patient presents with    Shaking     HPI  Shayy Cortez is a 63 year old female with a past medical history significant for melanoma s/p immunotherapy who presents to the Emergency Department for evaluation of neurological symptoms.  The patient notes that for the past several months she has noted increasing tremor, weakness, dizziness/vertigo, abnormal eye movements, and difficulty walking.  She also notes having a headache several times a week and some mild confusion.  She has been evaluated multiple times for the last in the ER near her home in [place] and notes that she was going to see neurology locally, but their only neurologist was going on maternity leave, so she was referred to see a neurologist here in the [facility].  She notes that until today, she was able to ambulate at home with the aid of a walker, however, today she was unable to ambulate.  She denies nausea or vomiting.    When she was seen in neuro/oncology clinic today at the [place] [facility], they determined she needed to be admitted for further workup so they referred her here.    Patient presents to the emergency department today accompanied by her significant other who reports that he had to carry her to the car today.  He has not had to do this before.    I have reviewed the Medications, Allergies, Past Medical and Surgical History, and Social History in the Epic system.    History reviewed. No pertinent past medical history.  History reviewed. No pertinent surgical history.  No current facility-administered medications for this encounter.     Current Outpatient Medications   Medication    cholecalciferol 50 MCG (2000 UT) CAPS    citalopram (CELEXA) 40 MG tablet    diazepam (VALIUM) 5 MG tablet    folic acid (FOLVITE) 1 MG tablet    gabapentin (NEURONTIN) 100 MG capsule    levothyroxine (SYNTHROID/LEVOTHROID) 50 MCG tablet    lidocaine-prilocaine (EMLA) 2.5-2.5 % external cream    meclizine (ANTIVERT)  25 MG tablet    metoclopramide (REGLAN) 10 MG tablet    omeprazole (PRILOSEC) 20 MG DR capsule    ondansetron (ZOFRAN ODT) 4 MG ODT tab    potassium chloride ER (K-TAB/KLOR-CON) 10 MEQ CR tablet    predniSONE (DELTASONE) 10 MG tablet    predniSONE (DELTASONE) 5 MG tablet    prochlorperazine (COMPAZINE) 10 MG tablet     No Known Allergies  Past medical history, past surgical history, medications, and allergies were reviewed with the patient. Additional pertinent items: None    Social History     Socioeconomic History    Marital status: Single     Spouse name: Not on file    Number of children: Not on file    Years of education: Not on file    Highest education level: Not on file   Occupational History    Not on file   Tobacco Use    Smoking status: Not on file    Smokeless tobacco: Not on file   Substance and Sexual Activity    Alcohol use: Not on file    Drug use: Not on file    Sexual activity: Not on file   Other Topics Concern    Not on file   Social History Narrative    Preloaded 3/19/13     Social Determinants of Health     Financial Resource Strain: Not on file   Food Insecurity: Not on file   Transportation Needs: Not on file   Physical Activity: Not on file   Stress: Not on file   Social Connections: Not on file   Interpersonal Safety: Not on file   Housing Stability: Not on file     Social history was reviewed with the patient. Additional pertinent items: None    Review of Systems  A medically appropriate review of systems was performed with pertinent positives and negatives noted in the HPI, and all other systems negative.    Physical Exam   BP: (!) 150/82  Pulse: 114  Temp: 98.5  F (36.9  C)  Resp: 17  SpO2: 97 %      General: Well nourished, well developed, NAD  HEENT: EOMI, anicteric. NCAT, MMM  Neck: no jugular venous distension, supple, nl ROM  Cardiac: Tachycardic rate, regular rhythm. No murmurs, rubs, or gallops. Normal S1, S2.  Intact peripheral pulses  Pulm: CTAB, no stridor, wheezes, rales,  rhonchi  Skin: Warm and dry to the touch.  No rash  Extremities: No LE edema, no cyanosis, w/w/p  Neuro: A&Ox3, diffuse shaking/tremor    ED Course        Procedures                      EKG Interpretation:      Interpreted by Christy Mclaughlin MD  Time reviewed: 1446  Symptoms at time of EKG: Weakness  Rhythm: Sinus tachycardia  Rate: Tachycardic, 108 bpm  Axis: normal  Ectopy: none  Conduction: normal  ST Segments/ T Waves: No ST-T wave changes  Q Waves: none  Comparison to prior: No old EKG available    Clinical Impression: abnormal EKG            Labs Ordered and Resulted from Time of ED Arrival to Time of ED Departure   LACTIC ACID WHOLE BLOOD - Abnormal       Result Value    Lactic Acid 2.2 (*)    CBC WITH PLATELETS AND DIFFERENTIAL - Abnormal    WBC Count 12.1 (*)     RBC Count 4.06      Hemoglobin 12.6      Hematocrit 37.2      MCV 92      MCH 31.0      MCHC 33.9      RDW 13.1      Platelet Count 353      % Neutrophils 73      % Lymphocytes 18      % Monocytes 7      % Eosinophils 1      % Basophils 0      % Immature Granulocytes 1      NRBCs per 100 WBC 0      Absolute Neutrophils 8.8 (*)     Absolute Lymphocytes 2.2      Absolute Monocytes 0.9      Absolute Eosinophils 0.1      Absolute Basophils 0.0      Absolute Immature Granulocytes 0.1      Absolute NRBCs 0.0     GLUCOSE BY METER - Abnormal    GLUCOSE BY METER POCT 106 (*)    COMPREHENSIVE METABOLIC PANEL   MAGNESIUM   TSH WITH FREE T4 REFLEX   TROPONIN T, HIGH SENSITIVITY   GLUCOSE MONITOR NURSING POCT   ROUTINE UA WITH MICROSCOPIC REFLEX TO CULTURE   INFLUENZA A/B, RSV, & SARS-COV2 PCR            Results for orders placed or performed during the hospital encounter of 01/09/24 (from the past 24 hour(s))   EKG 12-lead, tracing only   Result Value Ref Range    Systolic Blood Pressure  mmHg    Diastolic Blood Pressure  mmHg    Ventricular Rate 108 BPM    Atrial Rate 108 BPM    MI Interval 150 ms    QRS Duration 70 ms     ms    QTc 460 ms    P  Axis 85 degrees    R AXIS 80 degrees    T Axis 61 degrees    Interpretation ECG       Sinus tachycardia  Nonspecific ST and T wave abnormality  Abnormal ECG     Glucose by meter   Result Value Ref Range    GLUCOSE BY METER POCT 106 (H) 70 - 99 mg/dL   CBC with platelets differential    Narrative    The following orders were created for panel order CBC with platelets differential.  Procedure                               Abnormality         Status                     ---------                               -----------         ------                     CBC with platelets and d...[[id number]]  Abnormal            Final result                 Please view results for these tests on the individual orders.   Lactic acid whole blood   Result Value Ref Range    Lactic Acid 2.2 (H) 0.7 - 2.0 mmol/L   CBC with platelets and differential   Result Value Ref Range    WBC Count 12.1 (H) 4.0 - 11.0 10e3/uL    RBC Count 4.06 3.80 - 5.20 10e6/uL    Hemoglobin 12.6 11.7 - 15.7 g/dL    Hematocrit 37.2 35.0 - 47.0 %    MCV 92 78 - 100 fL    MCH 31.0 26.5 - 33.0 pg    MCHC 33.9 31.5 - 36.5 g/dL    RDW 13.1 10.0 - 15.0 %    Platelet Count 353 150 - 450 10e3/uL    % Neutrophils 73 %    % Lymphocytes 18 %    % Monocytes 7 %    % Eosinophils 1 %    % Basophils 0 %    % Immature Granulocytes 1 %    NRBCs per 100 WBC 0 <1 /100    Absolute Neutrophils 8.8 (H) 1.6 - 8.3 10e3/uL    Absolute Lymphocytes 2.2 0.8 - 5.3 10e3/uL    Absolute Monocytes 0.9 0.0 - 1.3 10e3/uL    Absolute Eosinophils 0.1 0.0 - 0.7 10e3/uL    Absolute Basophils 0.0 0.0 - 0.2 10e3/uL    Absolute Immature Granulocytes 0.1 <=0.4 10e3/uL    Absolute NRBCs 0.0 10e3/uL   CT Head w/o Contrast    Narrative    CT HEAD W/O CONTRAST 1/9/2024 3:08 PM    History: weak   EMR: 63 year old?female?with a past medical history significant  for?melanoma s/p immunotherapy?who presents to the Emergency  Department for evaluation of neurological symptoms. Patient presents  from [facility] with 3-5 months of progressive tremor,  vertigo, abnormal eye movements and ambulatory dysfunction. Patient  also has headache which occur several times a week and mild confusion.  Patient was referred to the ED to be examined by general neurology.  ?  Patient presents to triage for evaluation of shaking and inability to  walk. Patient is tremulous in triage, and is not able to walk without  assistance. Patient also endorses dizziness, denies n/v currently.  Patient was referred to ED by neurology for admission and further  workup.?    Comparison: MRI 10/12/2023    Technique: Using multidetector thin collimation helical acquisition  technique, axial, coronal and sagittal CT images from the skull base  to the vertex were obtained without intravenous contrast.   (topogram) image(s) also obtained and reviewed.    Findings: There is no intracranial hemorrhage, mass effect, or midline  shift. Gray/white matter differentiation in both cerebral hemispheres  is preserved. Ventricles are proportionate to the cerebral sulci and  stable in size since 10/12/2023. The basal cisterns are clear. Likely  prominent CSF space in left basal ganglia.  Carotid siphon calcifications.  The bony calvaria and the bones of the skull base are normal. The  visualized portions of the paranasal sinuses and mastoid air cells are  clear.      Impression    Impression:  No acute intracranial pathology. Consider MRI with contrast given  history of melanoma.     XR Chest 2 Views    Narrative    Chest 2 views    INDICATION: Weak    COMPARISON: None    FINDINGS: Heart size and shape appear normal. A left subclavian venous  catheter tip is near the SVC/right atrial junction. No definite  pneumothorax. Mild degenerative changes with osteopenia noted in the  mid to lower thoracic spine especially. Lungs and bony vasculature  otherwise appear unremarkable.      Impression    IMPRESSION: Venous catheter. Osteopenia with thoracic  spondylosis.    JOHN CHIN MD         SYSTEM ID:  [id number]       Labs, vital signs, and imaging studies were reviewed by me.    Medications   citalopram (celeXA) tablet 40 mg (has no administration in time range)   gabapentin (NEURONTIN) capsule 300 mg (has no administration in time range)   levothyroxine (SYNTHROID/LEVOTHROID) tablet 50 mcg (has no administration in time range)   meclizine (ANTIVERT) tablet 25 mg (has no administration in time range)   metoclopramide (REGLAN) tablet 10 mg (has no administration in time range)   omeprazole (PriLOSEC) CR capsule 20 mg (has no administration in time range)   ondansetron (ZOFRAN ODT) ODT tab 4 mg (has no administration in time range)   predniSONE (DELTASONE) tablet 5 mg (has no administration in time range)   prochlorperazine (COMPAZINE) tablet 10 mg (has no administration in time range)   lidocaine 1 % 0.1-1 mL (has no administration in time range)   lidocaine (LMX4) cream (has no administration in time range)   sodium chloride (PF) 0.9% PF flush 3 mL (has no administration in time range)   sodium chloride (PF) 0.9% PF flush 3 mL (has no administration in time range)   senna-docusate (SENOKOT-S/PERICOLACE) 8.6-50 MG per tablet 1 tablet (has no administration in time range)     Or   senna-docusate (SENOKOT-S/PERICOLACE) 8.6-50 MG per tablet 2 tablet (has no administration in time range)   calcium carbonate (TUMS) chewable tablet 1,000 mg (has no administration in time range)       Assessments & Plan (with Medical Decision Making)   Shayy Cortez is a 63 year old female who presents to the emergency department with generalized weakness and shaking.  Differential diagnosis includes Parkinson's disease, CVA/TIA, ICH, glucose/electrolyte/other metabolic abnormality, underlying infectious process (UTI, pneumonia, etc), drug ingestion/side effects, seizure, thyroid dysfunction, conversion disorder.  Labs, head CT, EKG ordered to further evaluate the patient.   Patient will be discussed with neurology.    Patient was discussed with neurology, no further recommendations at this time.  They will come see the patient in the emergency department.    EKG shows sinus tachycardia.    Laboratory workup is remarkable for lactate elevated at 2.2, blood glucose 106.    Head CT shows no acute intracranial pathology, MRI of the brain is recommended    Critical care was not performed.     Medical Decision Making  The patient's presentation was of high complexity (a chronic illness severe exacerbation, progression, or side effect of treatment).    The patient's evaluation involved:  an assessment requiring an independent historian (patient significant other)  review of external note(s) from 3+ sources (admission note from [facility] ER notes, neurology note from today)  review of 3+ test result(s) ordered prior to this encounter (CT's, MRI from prior to arrival)  strong consideration of a test (MRI of the brain, deferred to inpatient team) that was ultimately deferred  ordering and/or review of 3+ test(s) in this encounter (see separate area of note for details)  independent interpretation of testing performed by another health professional (CT, EKG)  discussion of management or test interpretation with another health professional (neurology)    The patient's management necessitated moderate risk (prescription drug management including medications given in the ED) and high risk (a decision regarding hospitalization).    CT images were personally reviewed by me, I agree with the radiology reads.    I have reviewed the nursing notes.    I have reviewed the findings, diagnosis, plan and need for follow up with the patient.    Patient and their further management were discussed with neurology, to be admitted to their service. Plan was discussed with patient who understands and agrees with plan.    New Prescriptions    No medications on file       Final diagnoses:   Shaking    Generalized weakness   Unable to ambulate       MERNA MCLAUGHLIN MD  1/9/2024   [facility] EMERGENCY DEPARTMENT       Merna Mclaughlin MD  01/09/24 9005

## 2024-01-09 NOTE — PROGRESS NOTES
Oncology Rooming Note    January 9, 2024 10:23 AM   Shayy Cortez is a 63 year old female who presents for:    Chief Complaint   Patient presents with    Oncology Clinic Visit     Initial Vitals: BP (!) 155/99 (BP Location: Left arm, Patient Position: Sitting, Cuff Size: Adult Regular)   Pulse (!) 123   Temp 98.4  F (36.9  C) (Oral)   Resp 16   Wt 47.2 kg (104 lb)   SpO2 98%  There is no height or weight on file to calculate BMI. There is no height or weight on file to calculate BSA.  Severe Pain (6) Comment: Data Unavailable   No LMP recorded.  Allergies reviewed: Yes  Medications reviewed: Yes    Medications: Medication refills not needed today.  Pharmacy name entered into EPIC: Data Unavailable    Frailty Screening:   Is the patient here for a new oncology consult visit in cancer care? 1. Yes. Over the past month, have you experienced difficulty or required a caregiver to assist with:   1. Balance, walking or general mobility (including any falls)? YES  2. Completion of self-care tasks such as bathing, dressing, toileting, grooming/hygiene?  YES  3. Concentration or memory that affects your daily life?  YES       Clinical concerns: no        Lizz Reynoso [facility]

## 2024-01-09 NOTE — PROGRESS NOTES
NEURO-ONCOLOGY INITIAL VISIT  Jan 9, 2024    CHIEF COMPLAINT: Ms. Shayy Cortez is a 63 year old right-handed woman with melanoma, who completed a year's treatment with nivolumab and over the course of the past 3-5 months has developed progressive tremor, vertigo, abnormal eye movements, and ambulatory dysfunction. These symptoms continued despite stopping immunotherapy plus use of steroids. She is presenting to this initial clinic visit for evaluation and is accompanied by Ever (significant other).     HISTORY OF PRESENT ILLNESS  -In October 2023, endorsed for several month history of headache, dizziness, and gait instability with new issues related to vision changes.    Worsening dizziness with recurrence of vertigo in the last 2-3 weeks.    Worsening tremor in the arms and legs plus whole body jerks.    Square wave jerks of the eyes with reduced visual acuity.    Legs feel stiff and weak. Previously was walking with a cane, then a walker, and now, she can no longer walk due to balance instability, leg stiffening, and the intensity of tremor.    -Headaches occur several times a week. Can be located around bilateral temporal areas and occipital areas. Severity rating 4/10. Described dull, irritating pain. Complains of neck pain often. Tylenol does give relief.    -She endorses mild confusion. Denies dysarthria.    -Reported constipation. Usually takes stool softener. Has tried senna or miralax without relief.    Denies urinary/bowel incontinence.    Has intermittent nausea. No dysphagia.  -With prednisone + gabapentin there has been no improvement.  -New onset of acting out her dreams.    No other periods of movement of arms without her knowledge.       MEDICATIONS   Current Outpatient Medications   Medication Sig Dispense Refill    cholecalciferol 50 MCG (2000 UT) CAPS Take 50 mcg by mouth      citalopram (CELEXA) 40 MG tablet Take 1 tablet by mouth daily      diazepam (VALIUM) 5 MG tablet Take 5 mg by mouth       folic acid (FOLVITE) 1 MG tablet Take 1 tablet by mouth daily      gabapentin (NEURONTIN) 100 MG capsule Take 300 mg by mouth      levothyroxine (SYNTHROID/LEVOTHROID) 50 MCG tablet Take 1 tablet by mouth daily      lidocaine-prilocaine (EMLA) 2.5-2.5 % external cream Apply to port area 1-2 hours prior to needle access. Cover with occlusive dressing unit procedure.      meclizine (ANTIVERT) 25 MG tablet Take 25 mg by mouth      metoclopramide (REGLAN) 10 MG tablet Take 10 mg by mouth      omeprazole (PRILOSEC) 20 MG DR capsule Take 20 mg by mouth      ondansetron (ZOFRAN ODT) 4 MG ODT tab Place 8 mg under the tongue      potassium chloride ER (K-TAB/KLOR-CON) 10 MEQ CR tablet Take 10 mEq by mouth      predniSONE (DELTASONE) 10 MG tablet Take 1 tablet by mouth daily      predniSONE (DELTASONE) 5 MG tablet Take 1 tablet by mouth every morning      prochlorperazine (COMPAZINE) 10 MG tablet Take 10 mg by mouth       DRUG ALLERGIES No Known Allergies      IMMUNIZATIONS   Immunization History   Administered Date(s) Administered    COVID-19 MONOVALENT 12+ ([employer]) 04/23/2021, 05/12/2021       PHYSICAL EXAMINATION  BP (!) 145/96 (BP Location: Right arm, Patient Position: Sitting, Cuff Size: Adult Regular)   Pulse 119   Temp 98.4  F (36.9  C) (Oral)   Resp 16   Wt 47.2 kg (104 lb)   SpO2 98%   Wt Readings from Last 2 Encounters:   01/09/24 47.2 kg (104 lb)      Ht Readings from Last 2 Encounters:   No data found for Ht     KPS: 50    -No is acute distress. Fatigued. Thin.   -Respiratory: Normal breath sounds, no audible wheezing.   -Hematologic/ lymphatic: No abnormal bruising. No leg swelling.  -Psychiatric: Normal mood and affect. Pleasant, though discouraged with current medical situation.  -Neurologic:   MENTAL STATUS:     Alert, oriented.    Recall: Intact    Speech fluent.   Comprehension intact to multi-step commands.   CRANIAL NERVES:     Pupils are equal, round.     Extraocular movements full, however,  pursuit is interrupted by square wave jerks.     Visual fields full.     Facial sensation intact to light touch.   Symmetric facial movements.   Hearing intact.   Mild dysarthria, cerebellar/ scanning. Hypophonia.   MOTOR:    Strength is 4+ to 5/5 throughout.    Severe resting and postural tremor in arms, legs, head, trunk.     Full body myoclonic jerks.  Mild increase in tone; mild cogwheeling.    Very bradykinetic on finger tapping.   REFLEX: Startle reflex.    Triple flexion at the patella.    Hyperreflexic throughout. No sustained clonus.   SENSATION: Intact to light touch throughout.  COORDINATION: Difficulty with finger-nose with eyes open due dysmetria and end-point tremor.   GAIT:  Stands with full assist.   Gait apraxia.   Wide-based stance.   Leg stiffness with standing.        MEDICAL RECORDS  Personally reviewed hospitalization records from neurology and oncology clinic visits.    LABS  Personally reviewed all available lab results;  CSF studies showed 33 nucleated cells with 84% lymphocytes, protein 78, 2 CSF oligoclonal bands and kappa free light chain elevation at 0.1500. Vitamin B12, folate, lyme western blot, CRP, TRAN, ANCA, serum/CSF paraneoplastic panel, myasthenia/lambert eaton panel, MUSK ab, SPEP, thiamine, niacin, vitamin E, homocysteine, MMA, SSA/SSB, ACE, copper level, GQ1B ab, treponema ab, CSF glucose, CSF culture, CSF cytology, CSF flow cytometry, CSF VDRL, CSF lyme, CSF meningoencephalitis panel, and CSF ACE levels were either negative or wthin normal range.      IMAGING  Personally reviewed; MR brain imaging with an incidental benign left cerebellar DVA, mild generalized cerebral and cerebellar volume loss, presumed small vessel disease. There was a focus of nonenhancing T2 signal abnormality small in nature anterior left temporal lobe either represents chronic ischemia versus posttraumatic.     To my eye, there is no clear atrophy of the midbrain;       Questionable atrophy of the dajuan  as seen in MSA;       CTA head/neck was negative for significant stenosis, occlusion, or dissection. There may be a small outpouching around left ICA suspicious for tiny aneurysm.          IMPRESSION  Preparatory, clinic, and post-visit time of 90 minutes was spent for this high complexity encounter discussing in detail her concerns and symptoms, answering questions pertaining to my recommendations, and devising the plan as outlined below. Prior to Shayy's appointment today, I have reviewed her her chart, imaging, and pathology results. I also reviewed her imaging and case reviewed at Brain Tumor Conference on 1/8 and no overt imaging abnormalities were appreciated.    Over the course of a short period, Shayy has noted a fairly rapid and progressive worsening in gait instability, now not being able to walk due to leg stiffness and dyskinsia/ apraxia, plus a worsening of her tremor, now with whole body myoclonic jerks. She also endorses a new onset of REM behavioral sleep disorder. To add to this history and as seen on examination, she has notable square wave jerks with movement of the eyes, bradykinesia, and hyperreflexia. While I have concern for a Parkinsonism subtype of MSA or PSP, her imaging does not fully support this as shown above. Prior CSF testing with noting overtly revealing aside from unexplained inflammation. Nivolumab penetrates into the CSF and can be associated with lingering inflammatory effect. However, given the time period of stopping nivolumab and use of steroids with only further worsening of symptoms, I have limited concern for Shayy's current clinical status being a long term sequela of immunotherapy toxicity.     Shayy is not safe at home due to her inability to ambulate. I have spoken with the [facility] neurology attending on-service, Dr. Shyam Gardiner to discuss her case. As a means to potentially repeat CSF evaluation, potentially have a consultation with a movement disorder colleague,  potentially start and titrate dopaminergic treatment to identify any symptomatic response, and to ensure a safe discharge with either placement or in-home services, the recommendation was for Shayy to present to the ED at [facility]. Shayy and Jorge are in agreement with this plan and [facility] called ahead to notify ED staff of need for neurology consultation upon presenting.     Amena Shaver MD  Neuro-oncology

## 2024-01-10 ENCOUNTER — APPOINTMENT (OUTPATIENT)
Dept: SPEECH THERAPY | Facility: CLINIC | Age: 64
End: 2024-01-10
Payer: COMMERCIAL

## 2024-01-10 LAB
ALBUMIN UR-MCNC: NEGATIVE MG/DL
ANION GAP SERPL CALCULATED.3IONS-SCNC: 11 MMOL/L (ref 7–15)
APPEARANCE CSF: CLEAR
APPEARANCE UR: CLEAR
ATRIAL RATE - MUSE: 130 BPM
BACTERIA #/AREA URNS HPF: ABNORMAL /HPF
BASOPHILS # BLD AUTO: 0 10E3/UL (ref 0–0.2)
BASOPHILS NFR BLD AUTO: 0 %
BILIRUB UR QL STRIP: NEGATIVE
BUN SERPL-MCNC: 12 MG/DL (ref 8–23)
CALCIUM SERPL-MCNC: 9.4 MG/DL (ref 8.8–10.2)
CHLORIDE SERPL-SCNC: 106 MMOL/L (ref 98–107)
COLOR CSF: COLORLESS
COLOR UR AUTO: ABNORMAL
CREAT SERPL-MCNC: 0.9 MG/DL (ref 0.51–0.95)
DEPRECATED HCO3 PLAS-SCNC: 21 MMOL/L (ref 22–29)
DIASTOLIC BLOOD PRESSURE - MUSE: NORMAL MMHG
EGFRCR SERPLBLD CKD-EPI 2021: 72 ML/MIN/1.73M2
EOSINOPHIL # BLD AUTO: 0 10E3/UL (ref 0–0.7)
EOSINOPHIL NFR BLD AUTO: 0 %
ERYTHROCYTE [DISTWIDTH] IN BLOOD BY AUTOMATED COUNT: 13.1 % (ref 10–15)
GLUCOSE CSF-MCNC: 54 MG/DL (ref 40–70)
GLUCOSE SERPL-MCNC: 104 MG/DL (ref 70–99)
GLUCOSE UR STRIP-MCNC: NEGATIVE MG/DL
HCT VFR BLD AUTO: 32.4 % (ref 35–47)
HGB BLD-MCNC: 11 G/DL (ref 11.7–15.7)
HGB UR QL STRIP: NEGATIVE
HOLD SPECIMEN: NORMAL
IMM GRANULOCYTES # BLD: 0.1 10E3/UL
IMM GRANULOCYTES NFR BLD: 1 %
INTERPRETATION ECG - MUSE: NORMAL
KETONES UR STRIP-MCNC: NEGATIVE MG/DL
LACTATE SERPL-SCNC: 1.5 MMOL/L (ref 0.7–2)
LACTATE SERPL-SCNC: 1.6 MMOL/L (ref 0.7–2)
LACTATE SERPL-SCNC: 3.7 MMOL/L (ref 0.7–2)
LEUKOCYTE ESTERASE UR QL STRIP: ABNORMAL
LYMPH ABN NFR CSF MANUAL: 91 %
LYMPHOCYTES # BLD AUTO: 1.3 10E3/UL (ref 0.8–5.3)
LYMPHOCYTES NFR BLD AUTO: 13 %
Lab: NORMAL
MCH RBC QN AUTO: 31.1 PG (ref 26.5–33)
MCHC RBC AUTO-ENTMCNC: 34 G/DL (ref 31.5–36.5)
MCV RBC AUTO: 92 FL (ref 78–100)
MONOCYTES # BLD AUTO: 0.6 10E3/UL (ref 0–1.3)
MONOCYTES NFR BLD AUTO: 6 %
MONOS+MACROS NFR CSF MANUAL: 9 %
NEUTROPHILS # BLD AUTO: 8.1 10E3/UL (ref 1.6–8.3)
NEUTROPHILS NFR BLD AUTO: 80 %
NEUTROPHILS NFR CSF MANUAL: NORMAL %
NITRATE UR QL: NEGATIVE
NRBC # BLD AUTO: 0 10E3/UL
NRBC BLD AUTO-RTO: 0 /100
P AXIS - MUSE: NORMAL DEGREES
PERFORMING LABORATORY: NORMAL
PH UR STRIP: 6.5 [PH] (ref 5–7)
PLATELET # BLD AUTO: 319 10E3/UL (ref 150–450)
POTASSIUM SERPL-SCNC: 3.9 MMOL/L (ref 3.4–5.3)
PR INTERVAL - MUSE: 112 MS
PROT CSF-MCNC: 85.2 MG/DL (ref 15–45)
QRS DURATION - MUSE: 68 MS
QT - MUSE: 382 MS
QTC - MUSE: 562 MS
R AXIS - MUSE: 77 DEGREES
RBC # BLD AUTO: 3.54 10E6/UL (ref 3.8–5.2)
RBC # CSF MANUAL: 2 /UL (ref 0–2)
RBC URINE: 1 /HPF
SODIUM SERPL-SCNC: 138 MMOL/L (ref 135–145)
SP GR UR STRIP: 1.01 (ref 1–1.03)
SPECIMEN STATUS: NORMAL
SQUAMOUS EPITHELIAL: 1 /HPF
SYSTOLIC BLOOD PRESSURE - MUSE: NORMAL MMHG
T AXIS - MUSE: 81 DEGREES
TEST NAME: NORMAL
TRANSITIONAL EPI: 1 /HPF
TUBE # CSF: 4
UROBILINOGEN UR STRIP-MCNC: NORMAL MG/DL
VENTRICULAR RATE- MUSE: 130 BPM
WBC # BLD AUTO: 10.1 10E3/UL (ref 4–11)
WBC # CSF MANUAL: 32 /UL (ref 0–5)
WBC URINE: 5 /HPF

## 2024-01-10 PROCEDURE — 84999 UNLISTED CHEMISTRY PROCEDURE: CPT

## 2024-01-10 PROCEDURE — 88188 FLOWCYTOMETRY/READ 9-15: CPT | Mod: GC | Performed by: PATHOLOGY

## 2024-01-10 PROCEDURE — 83605 ASSAY OF LACTIC ACID: CPT

## 2024-01-10 PROCEDURE — 86341 ISLET CELL ANTIBODY: CPT | Performed by: PSYCHIATRY & NEUROLOGY

## 2024-01-10 PROCEDURE — 92507 TX SP LANG VOICE COMM INDIV: CPT | Mod: GN

## 2024-01-10 PROCEDURE — 88108 CYTOPATH CONCENTRATE TECH: CPT | Mod: TC

## 2024-01-10 PROCEDURE — 92523 SPEECH SOUND LANG COMPREHEN: CPT | Mod: GN

## 2024-01-10 PROCEDURE — 250N000011 HC RX IP 250 OP 636

## 2024-01-10 PROCEDURE — 30233S1 TRANSFUSION OF NONAUTOLOGOUS GLOBULIN INTO PERIPHERAL VEIN, PERCUTANEOUS APPROACH: ICD-10-PCS | Performed by: PSYCHIATRY & NEUROLOGY

## 2024-01-10 PROCEDURE — 36415 COLL VENOUS BLD VENIPUNCTURE: CPT | Performed by: PSYCHIATRY & NEUROLOGY

## 2024-01-10 PROCEDURE — 84157 ASSAY OF PROTEIN OTHER: CPT

## 2024-01-10 PROCEDURE — 36415 COLL VENOUS BLD VENIPUNCTURE: CPT

## 2024-01-10 PROCEDURE — 82390 ASSAY OF CERULOPLASMIN: CPT

## 2024-01-10 PROCEDURE — 87086 URINE CULTURE/COLONY COUNT: CPT

## 2024-01-10 PROCEDURE — 86255 FLUORESCENT ANTIBODY SCREEN: CPT

## 2024-01-10 PROCEDURE — 88108 CYTOPATH CONCENTRATE TECH: CPT | Mod: 26 | Performed by: PATHOLOGY

## 2024-01-10 PROCEDURE — 88184 FLOWCYTOMETRY/ TC 1 MARKER: CPT

## 2024-01-10 PROCEDURE — 250N000011 HC RX IP 250 OP 636: Mod: JZ

## 2024-01-10 PROCEDURE — 250N000013 HC RX MED GY IP 250 OP 250 PS 637

## 2024-01-10 PROCEDURE — 009U3ZX DRAINAGE OF SPINAL CANAL, PERCUTANEOUS APPROACH, DIAGNOSTIC: ICD-10-PCS | Performed by: PSYCHIATRY & NEUROLOGY

## 2024-01-10 PROCEDURE — 258N000003 HC RX IP 258 OP 636

## 2024-01-10 PROCEDURE — 86341 ISLET CELL ANTIBODY: CPT

## 2024-01-10 PROCEDURE — 99418 PROLNG IP/OBS E/M EA 15 MIN: CPT | Performed by: PSYCHIATRY & NEUROLOGY

## 2024-01-10 PROCEDURE — 80048 BASIC METABOLIC PNL TOTAL CA: CPT

## 2024-01-10 PROCEDURE — 87040 BLOOD CULTURE FOR BACTERIA: CPT

## 2024-01-10 PROCEDURE — 84182 PROTEIN WESTERN BLOT TEST: CPT

## 2024-01-10 PROCEDURE — 92610 EVALUATE SWALLOWING FUNCTION: CPT | Mod: GN

## 2024-01-10 PROCEDURE — 87205 SMEAR GRAM STAIN: CPT

## 2024-01-10 PROCEDURE — 84999 UNLISTED CHEMISTRY PROCEDURE: CPT | Performed by: PSYCHIATRY & NEUROLOGY

## 2024-01-10 PROCEDURE — 99233 SBSQ HOSP IP/OBS HIGH 50: CPT | Mod: 25 | Performed by: PSYCHIATRY & NEUROLOGY

## 2024-01-10 PROCEDURE — 85025 COMPLETE CBC W/AUTO DIFF WBC: CPT

## 2024-01-10 PROCEDURE — 88185 FLOWCYTOMETRY/TC ADD-ON: CPT

## 2024-01-10 PROCEDURE — 250N000012 HC RX MED GY IP 250 OP 636 PS 637

## 2024-01-10 PROCEDURE — 86596 VOLTAGE-GTD CA CHNL ANTB EA: CPT | Performed by: PSYCHIATRY & NEUROLOGY

## 2024-01-10 PROCEDURE — 62270 DX LMBR SPI PNXR: CPT | Mod: GC | Performed by: PSYCHIATRY & NEUROLOGY

## 2024-01-10 PROCEDURE — 82945 GLUCOSE OTHER FLUID: CPT

## 2024-01-10 PROCEDURE — 120N000002 HC R&B MED SURG/OB UMMC

## 2024-01-10 PROCEDURE — 81001 URINALYSIS AUTO W/SCOPE: CPT

## 2024-01-10 PROCEDURE — 89051 BODY FLUID CELL COUNT: CPT

## 2024-01-10 RX ORDER — DIPHENHYDRAMINE HYDROCHLORIDE 50 MG/ML
50 INJECTION INTRAMUSCULAR; INTRAVENOUS ONCE
Status: COMPLETED | OUTPATIENT
Start: 2024-01-10 | End: 2024-01-10

## 2024-01-10 RX ORDER — DIPHENHYDRAMINE HYDROCHLORIDE 50 MG/ML
50 INJECTION INTRAMUSCULAR; INTRAVENOUS
Status: DISCONTINUED | OUTPATIENT
Start: 2024-01-10 | End: 2024-01-18 | Stop reason: HOSPADM

## 2024-01-10 RX ORDER — LORAZEPAM 2 MG/ML
1 INJECTION INTRAMUSCULAR
Status: DISCONTINUED | OUTPATIENT
Start: 2024-01-10 | End: 2024-01-10

## 2024-01-10 RX ORDER — ACETAMINOPHEN 325 MG/1
650 TABLET ORAL ONCE
Status: COMPLETED | OUTPATIENT
Start: 2024-01-10 | End: 2024-01-10

## 2024-01-10 RX ORDER — DIPHENHYDRAMINE HCL 50 MG
50 CAPSULE ORAL
Status: DISCONTINUED | OUTPATIENT
Start: 2024-01-10 | End: 2024-01-18 | Stop reason: HOSPADM

## 2024-01-10 RX ORDER — METHYLPREDNISOLONE SODIUM SUCCINATE 125 MG/2ML
125 INJECTION, POWDER, LYOPHILIZED, FOR SOLUTION INTRAMUSCULAR; INTRAVENOUS
Status: DISCONTINUED | OUTPATIENT
Start: 2024-01-10 | End: 2024-01-18 | Stop reason: HOSPADM

## 2024-01-10 RX ORDER — LORAZEPAM 2 MG/ML
1 INJECTION INTRAMUSCULAR ONCE
Status: COMPLETED | OUTPATIENT
Start: 2024-01-10 | End: 2024-01-10

## 2024-01-10 RX ORDER — DIPHENHYDRAMINE HCL 50 MG
50 CAPSULE ORAL ONCE
Status: COMPLETED | OUTPATIENT
Start: 2024-01-10 | End: 2024-01-10

## 2024-01-10 RX ORDER — ACETAMINOPHEN 325 MG/1
650 TABLET ORAL
Status: DISCONTINUED | OUTPATIENT
Start: 2024-01-10 | End: 2024-01-18 | Stop reason: HOSPADM

## 2024-01-10 RX ADMIN — SODIUM CHLORIDE, POTASSIUM CHLORIDE, SODIUM LACTATE AND CALCIUM CHLORIDE 1000 ML: 600; 310; 30; 20 INJECTION, SOLUTION INTRAVENOUS at 02:18

## 2024-01-10 RX ADMIN — ACETAMINOPHEN 650 MG: 325 TABLET, FILM COATED ORAL at 18:04

## 2024-01-10 RX ADMIN — PREDNISONE 5 MG: 5 TABLET ORAL at 07:59

## 2024-01-10 RX ADMIN — ACETAMINOPHEN 975 MG: 325 TABLET, FILM COATED ORAL at 09:46

## 2024-01-10 RX ADMIN — SODIUM CHLORIDE 500 ML: 9 INJECTION, SOLUTION INTRAVENOUS at 15:00

## 2024-01-10 RX ADMIN — GABAPENTIN 300 MG: 300 CAPSULE ORAL at 21:51

## 2024-01-10 RX ADMIN — PANTOPRAZOLE SODIUM 40 MG: 40 TABLET, DELAYED RELEASE ORAL at 07:59

## 2024-01-10 RX ADMIN — DIPHENHYDRAMINE HYDROCHLORIDE 50 MG: 50 CAPSULE ORAL at 18:04

## 2024-01-10 RX ADMIN — HUMAN IMMUNOGLOBULIN G 20 G: 20 LIQUID INTRAVENOUS at 18:52

## 2024-01-10 RX ADMIN — ACETAMINOPHEN 650 MG: 325 TABLET, FILM COATED ORAL at 22:00

## 2024-01-10 RX ADMIN — ACETAMINOPHEN 975 MG: 325 TABLET, FILM COATED ORAL at 01:26

## 2024-01-10 RX ADMIN — LEVOTHYROXINE SODIUM 50 MCG: 0.05 TABLET ORAL at 08:00

## 2024-01-10 RX ADMIN — CITALOPRAM 40 MG: 40 TABLET ORAL at 07:59

## 2024-01-10 RX ADMIN — LORAZEPAM 1 MG: 2 INJECTION, SOLUTION INTRAMUSCULAR; INTRAVENOUS at 13:04

## 2024-01-10 ASSESSMENT — ABNORMAL INVOLUNTARY MOVEMENT SCALE (AIMS)
AIMS_SEVERITY: MILD
LIPS_PARIETAL: MINIMAL
FACIAL_EXPRESSION_MUSCLES: MINIMAL
AIMS_PATIENT_INCAPACITATION: MILD
TONGUE: MINIMAL
UPPER_BODY_EXTREMITIES: MINIMAL
JAW: MINIMAL
NECK_SHOULDER_HIPS: MILD
AIMS_PATIENT_AWARENESS: AWARE, NO DISTRESS
LOWER_BODY_EXTREMITIES: MILD

## 2024-01-10 ASSESSMENT — ACTIVITIES OF DAILY LIVING (ADL)
ADLS_ACUITY_SCORE: 35

## 2024-01-10 NOTE — PHARMACY-ADMISSION MEDICATION HISTORY
Pharmacy Intern Admission Medication History    Admission medication history is complete. The information provided in this note is only as accurate as the sources available at the time of the update.    Information Source(s): Patient and CareEverywhere/SureScripts via in-person    Pertinent Information: Care Everywhere source was from [facility] and appeared up to date (2023 start dates) patient was knowledgeable of her medications. Patient reported that she has taken no medications today (last doses were still asked for each med)    Patient reports that meclizine and metoclopramide are both not very effective for their indications.     Prednisone 10 mg used to be taken when she got worse. The patient's doctor told the patient to only use the 5 mg now.     Directions from Care Everywhere for potassium are BID but the patient has been taking once daily due to better potassium levels.     Patient reports 5 pills in the morning and 5 pills at bedtime. AM: folic acid, levothyroxine, omeprazole, potassium, and prednisone. HS: vitamin D-3, citalopram, and gabapentin (x3).    Changes made to PTA medication list:  Added: acetaminophen 500 mg tablet (per pt)  Deleted: Prednisone 10 mg tablet, take 1 tablet PO once daily (per pt)  Changed: Diazepam- added frequency and PRN on (per [facility])  Gabapentin- added frequency/time (per [facility] and patient)  Omeprazole- added frequency/time (per [facility])  Ondansetron- added frequency and PRN on (per [facility])  Prochlorperazine-  added frequency and PRN on (per [facility])  Potassium- added frequency (per [facility])   Meclizine- added frequency and PRN on (per [facility])  Metoclopramide- added frequency and PRN on (per [facility])    Medication Affordability: did not discuss  Allergies reviewed with patient and updates made in EHR: yes    Medication History Completed By: Jarod Barger 1/9/2024 6:10 PM    PTA Med List   Medication Sig Note Last Dose    acetaminophen (TYLENOL) 500 MG tablet  Take 500-1,000 mg by mouth every 6 hours as needed for mild pain  PRN at PRN    cholecalciferol 50 MCG (2000 UT) CAPS Take 50 mcg by mouth daily  1/8/2024 at HS    citalopram (CELEXA) 40 MG tablet Take 1 tablet by mouth daily  1/8/2024 at HS    diazepam (VALIUM) 5 MG tablet Take 5 mg by mouth every 8 hours as needed for anxiety  More than a month at PRN    folic acid (FOLVITE) 1 MG tablet Take 1 tablet by mouth daily  1/8/2024 at AM    gabapentin (NEURONTIN) 100 MG capsule Take 300 mg by mouth at bedtime  1/8/2024 at HS    levothyroxine (SYNTHROID/LEVOTHROID) 50 MCG tablet Take 1 tablet by mouth daily  1/8/2024 at AM    lidocaine-prilocaine (EMLA) 2.5-2.5 % external cream Apply to port area 1-2 hours prior to needle access. Cover with occlusive dressing unit procedure.  PRN at PRN    omeprazole (PRILOSEC) 20 MG DR capsule Take 20 mg by mouth every morning (before breakfast)  1/8/2024 at AM    ondansetron (ZOFRAN ODT) 4 MG ODT tab Place 8 mg under the tongue every 8 hours as needed for vomiting or nausea  PRN at PRN    potassium chloride ER (K-TAB/KLOR-CON) 10 MEQ CR tablet Take 10 mEq by mouth 2 times daily 1/9/2024: Pt has been taking once daily due to levels 1/8/2024 at AM    predniSONE (DELTASONE) 5 MG tablet Take 1 tablet by mouth every morning  1/8/2024 at AM    prochlorperazine (COMPAZINE) 10 MG tablet Take 10 mg by mouth every 6 hours as needed for nausea  PRN at PRN

## 2024-01-10 NOTE — PROGRESS NOTES
"Speech-Language Pathology:  Clinical Swallow Evaluation and Speech Language Cognitive Evaluation     01/10/24 0900   Appointment Info   Signing Clinician's Name / Credentials (SLP) Irene Sarmiento MS, CCC-SLP   General Information   Onset of Illness/Injury or Date of Surgery 01/09/24   Referring Physician Liz Ro MD   Pertinent History of Current Problem Per referring provider, \"63 year old female with a past medical history significant for melanoma s/p immunotherapy who presents to the Emergency Department for evaluation of neurological symptoms.  The patient notes that for the past several months she has noted increasing tremor, weakness, dizziness/vertigo, abnormal eye movements, and difficulty walking.  She also notes having a headache several times a week and some mild confusion.  She has been evaluated multiple times for the last in the ER near her home in [place] and notes that she was going to see neurology locally, but their only neurologist was going on maternity leave, so she was referred to see a neurologist here in the [facility].  She notes that until today, she was able to ambulate at home with the aid of a walker, however, today she was unable to ambulate.  She denies nausea or vomiting.     When she was seen in neuro/oncology clinic today at the [place] [facility], they determined she needed to be admitted for further workup so they referred her here.     Patient presents to the emergency department today accompanied by her significant other who reports that he had to carry her to the car today.  He has not had to do this before.\" Clinical swallow evaluation & Cognitive-linguistic evaluation completed per MD orders.   General Observations Alert and cooperative. Pt's  arrived during evaluation.   Type of Evaluation   Type of Evaluation Swallow Evaluation;Speech, Language, Cognition   Oral Motor   Oral Musculature generally intact   Structural Abnormalities none present   Mucosal " Quality dry  (per pt report d/t medications)   Dentition (Oral Motor)   Dentition (Oral Motor) adequate dentition   Facial Symmetry (Oral Motor)   Facial Symmetry (Oral Motor) WNL   Lip Function (Oral Motor)   Lip Range of Motion (Oral Motor) WNL   Lip Strength (Oral Motor) WFL   Lip Coordination (Oral Motor) bilateral   Tongue Function (Oral Motor)   Tongue Strength (Oral Motor) WNL   Tongue Coordination/Speed (Oral Motor) WNL   Tongue ROM (Oral Motor) WNL   Jaw Function (Oral Motor)   Jaw Function (Oral Motor) WNL  (tremor noted)   Comment, Jaw Function (Oral Motor) mandibular tremor noted   Facial Sensation   Facial Sensation WNL   Cough/Swallow/Gag Reflex (Oral Motor)   Soft Palate/Velum (Oral Motor) other (see comments)  (unable to view. Tongue obstructing view)   Volitional Throat Clear/Cough (Oral Motor) WNL   Volitional Swallow (Oral Motor) mildly delayed   Vocal Quality/Secretion Management (Oral Motor)   Vocal Quality (Oral Motor) WNL   Secretion Management (Oral Motor) WNL   General Swallowing Observations   Past History of Dysphagia Per EMR review & pt report, none. Pt reports having licorice stuck in throat x1, which cleared with multiple swallows.   Comment, General Swallowing Observations RN endorses good tolerance of diet & reported taking 4 medications at once with thin liquids with no overt s/s of aspiration.   Respiratory Support room air   Current Diet/Method of Nutritional Intake (General Swallowing Observations, NIS) clear liquid diet;thin liquids (level 0)   Swallowing Evaluation Clinical swallow evaluation   Clinical Swallow Evaluation   Feeding Assistance minimal assistance required  (may benefit depending on severity of tremors during meal)   Additional evaluation(s) completed today No   Clinical Swallow Evaluation Textures Trialed thin liquids;pureed;solid foods   Clinical Swallow Eval: Thin Liquid Texture Trial   Mode of Presentation, Thin Liquids straw;self-fed   Volume of Liquid or Food  Presented ~4 oz   Oral Phase of Swallow WFL   Pharyngeal Phase of Swallow intact   Diagnostic Statement No overt s/s of aspiration.   Clinical Swallow Evaluation: Puree Solid Texture Trial   Mode of Presentation, Puree spoon;self-fed   Volume of Puree Presented ~2 oz   Oral Phase, Puree WFL   Pharyngeal Phase, Puree intact   Diagnostic Statement No overt s/s of aspiration or dysphagia. Pt denied globus sensation.   Clinical Swallow Evaluation: Solid Food Texture Trial   Mode of Presentation self-fed   Volume Presented 1 bernie cracker   Oral Phase WFL   Pharyngeal Phase intact   Diagnostic Statement No overt s/s of aspiration or dysphagia. Timely and complete mastication. Pt denied globus sensation.   Esophageal Phase of Swallow   Patient reports or presents with symptoms of esophageal dysphagia No   Swallowing Recommendations   Diet Consistency Recommendations thin liquids (level 0);regular diet   Supervision Level for Intake patient independent  (may benefit from feeding assist d/t tremors)   Mode of Delivery Recommendations bolus size, small;slow rate of intake   Postural Recommendations none   Monitoring/Assistance Required (Eating/Swallowing) stop eating activities when fatigue is present;monitor for cough or change in vocal quality with intake   Recommended Feeding/Eating Techniques (Swallow Eval) maintain upright sitting position for eating;provide assist with feeding;set-up and prepare tray  (assist as needed d/t tremors)   Medication Administration Recommendations, Swallowing (SLP) as tolerated   Instrumental Assessment Recommendations instrumental evaluation not recommended at this time   Motor Speech   Vocal Loudness (Motor Speech) WNL   Speech Intelligibility (Motor Speech) WNL   Breath Support (Motor Speech) intact   Resonance (Motor Speech) WNL   Speech Fluency (Motor Speech) WNL   Rate/Prosody (Motor Speech) WNL   Articulation (Motor Speech) WNL   Phonation (motor speech) Adequate   Auditory  "Comprehension   Follows Commands (Auditory Comprehension) WNL   Yes/No Questions (Auditory Comprehension) WNL;biographical/personal questions;simple/factual questions;complex questions   Biographical/Personal Questions (Auditory Comprehension) intact   Simple/Factual Questions (Auditory Comprehension) intact   Complex Questions (Auditory Comprehension) intact   Verbal Expression   Comment, Assessment (Verbal Expression) Per pt report, infrequent word-finding during conversation, which has been ongoing with gradual onset ~4 months ago and worsening with medical status. Pt &  reports that it is functional as he will help fill it in, because he is impatient, and then no difficulties with continuing the conversation. No word-finding difficulties observed during evaluation.   Confrontational Naming (Verbal Expression) WNL   Conversational Speech (Verbal Expression) WNL   Responsive Naming (Verbal Expression) sentence completion   Repetition Skills (Verbal Expression) WNL   Sentence Completion, Responsive Naming (Verbal Expression) intact   Pragmatic Language   Verbal Skills (Pragmatic Language) WNL   Nonverbal Skills (Pragmatic Language) WNL   Cognition   Cognitive Function WFL   Cognitive Status Exam Comments Patient reports ongoing gradual onset of minimal word-finding deficits. Patient &  report that this is baseline for the last few months. Patient reports that they currently have a \"system\" and is communicating/cognitively functioning with no difficulties.   Orientation Status (Cognition) oriented x 4   Affect/Mental Status (Cognition) WNL   Follows Commands (Cognition) WNL   Clinical Impression   Criteria for Skilled Therapeutic Interventions Met (SLP Eval) Evaluation only   Risks & Benefits of therapy have been explained evaluation/treatment results reviewed;care plan/treatment goals reviewed;risks/benefits reviewed;current/potential barriers reviewed;participants voiced agreement with care " "plan;participants included;patient;spouse/significant other   Clinical Impression Comments Clinical Swallow Evaluation completed. Patient with clear vocal quality. Oral motor function was WNL. Patient had no s/s aspiration and no signs of dysphagia across thin liquids, puree, and regular solid. Mastication was timely and complete. Hyolaryngeal elevation appears intact. Recommend regular diet and thin liquids when sitting fully upright and alert. Patient reports self-feeding, like completed during the evaluation, but may benefit from 1:1 staff assist d/t tremors, at times. Cognitive-linguistic evaluation completed per MD orders. Patient's cognitive-linguistic skills are baseline per pt's & her  report. Patient presents with minimal word-finding difficulties, which has been ongoing for ~4 months since her medical status began to decline. Patient reports that her  & herself currently have a \"system\", as he assists with filling in the word as needed, and is communicating/cognitively functioning with no difficulties.   SLP Total Evaluation Time   Eval: oral/pharyngeal swallow function, clinical swallow Minutes (92222) 15   Eval: Sound production with lang comprehension and expression Minutes (38300) 15   SLP Discharge Planning   SLP Plan s/o   SLP Discharge Recommendation other (see comments)  (defer to medical team)   SLP Rationale for DC Rec No further ST warrented. Patient at baseline with no overt s/s of aspiration & minimal word-finding deficits, which has been ongoing.   SLP Brief overview of current status  Recommend regular diet and thin liquids when sitting fully upright and alert. Patient with minimal word-finding deficits at baseline, which pt reports is functional. No further speech therapy warrented at this time.     "

## 2024-01-10 NOTE — PROGRESS NOTES
[facility] Procedure Note          Lumbar Puncture:      Time: 4:18 PM  Performed by: Sam Narvaez MD, Liz Ro MD, Alma Gilman MD  Authorized by: Alma Gilman MD    Indications: c/f stiff person syndrome or other causes for movement     Consent given by: Patient who states understanding of the procedure being performed after discussing the risks, benefits and alternatives.    Prior to the start of the procedure and with procedural staff participation, I verbally confirmed the patient s identity using two indicators, relevant allergies, that the procedure was appropriate and matched the consent or emergent situation, and that the correct equipment/implants were available. Immediately prior to starting the procedure I conducted the Time Out with the procedural staff and re-confirmed the patient s name, procedure, and site/side. (The Joint Commission universal protocol was followed.) Yes    Under sterile conditions the patient was positioned L Lateral decubitus with knees drawn up. Betadine solution and sterile drapes were utilized.  Local anesthetic at the site: 2 ml of lidocaine 1% without epinephrine from the LP tray  A 20G x 3''  spinal needle was inserted at the L 3-4 interspace.  Opening Pressurewas not checked.  A total of 22 mL of clear and colorless spinal fluid was obtained and sent to the laboratory.   After the needle was removed, a bandaid and pressure were applied and the patient was instructed to stay horizontal until the results were back.    Complications:  None    Patient tolerance: Patient tolerated the procedure well with no immediate complications.

## 2024-01-10 NOTE — H&P
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      She was initially started on Tafinlar and Mekinist in August in  c/b cytopenia and septic shock requiring ICU and pressor support s/p Vanc/Zosyn/Doxy. She was subsequently switched to Opdivo in 2022 and has been doing well up until 2023 when she started experiencing tremors, blurred and double vision, vertigo and intermittent confusion. She states that since cessation of Opdivo her symptoms have steadily progressed for the worst.     Per chart review she endorses bilateral temporal and occipital headaches with severity of 4/10 being described as dull pain. Additionally endorses mild confusion with start or stop patterns in the mid sentence. She also has neck pain and dizziness since August. Describes vertical diplopia and worse with far away objects. High frequency physiologic tremor in the arms and legs with whole body jerks. Her legs feel stiff and weak. She was walking to instability, stiffness and tremor intensity.  She was noted to have jerky movement of the eyes, bradykinesia, and hyperreflexia. She went to the ER in 2023 at which time the head MRI imaging  shows a focus of nonenhancing T2 signal abnormality small in nature anterior left temporal lobe either represents chronic ischemia versus posttraumatic. There is no notable brain atrophy. Head CTA negative for stenosis, occlusions or dissection. Prior CSF from LP from December 2023 is largely unrevealing with some lymphocytosis. In December 22 2023 she was her Neurologist and was meant to be started on a 5-day course of high-dose solumedrol however was not due to timing issues. Was started on gabapentin for tremor.     She is afebrile with , and resting pulse of 114, breathing on room air. On physical exam she has saccadic tracking eye movements, dysmetria, resting tremor, gait instability. Overall she has good muscle strength throughout. Her most current labs are notable for WBC 12.1. Otherwise electrolytes are within normal limits. Head CT is pending.    ROS: A comprehensive ROS was performed and pertinent findings were included in HPI.     PMH:  History reviewed. No pertinent past medical history.  History reviewed. No pertinent surgical history.    Medications   I have personally reviewed the patient's medication list.     Allergies  I have personally reviewed the patient's allergy list.     Social History:  Former smoker, quit in 2011. Has history of alcohol abuse per chart.     Family History:    No related family history on file.     Physical Examination:   Constitutional   - Vitals: BP (!) 150/82   Pulse 114   Temp 98.5  F (36.9  C) (Oral)   Resp 17   SpO2 97%    - General: Lying in bed, frail appearing, NAD  Head: NC/AT  Eyes: no icterus, op pink and moist  Cardiac: RRR. Extremities warm, no edema.   Respiratory: non-labored on RA  GI: S/NT/ND  Skin: No rash or lesion on exposed skin  Psych: Mood pleasant, affect congruent  Neuro:  Mental status: Awake, alert, attentive, oriented to self, time, place, and circumstance. Language is fluent and coherent with intact comprehension of complex commands,  naming and repetition.  Cranial nerves: VFF, PERRL, conjugate gaze, upper gaze palsy, with occasional square wave jerks, facial sensation intact, face symmetric, shoulder shrug strong, tongue/uvula midline, no dysarthria. No tongue fasciculation, no jaw jerk.  Motor: Normal bulk and slightly increased muscle tone in lower extremities. High frequency tremor, predominantly postural and intentional, with whole body jerks. 5/5 strength bilaterally in deltoids, biceps, triceps, hand , hip flexors, hip extensors, knee flexion, knee extension, plantarflexion, dorsiflexion.   Reflexes: hyperreflexic in biceps, brachioradialis, triceps, patellae, and achilles. Negative Antony, no clonus, toes down-going.  Sensory: Intact to light touch, pin, vibration, and proprioception  Coordination: FNF and HS without ataxia or dysmetria.  Gait: Impaired. Axial rigidity noted.    Investigations:    I have personally reviewed pertinent labs, tests, and radiology images. Discussion of notable findings is included under Impression.     Did the patient transfer from an outside hospital?   No    Assessment:  Shayy Cortez is a 63 year old female with past medical history of melanoma s/p one ethel treatment of nivolumab with development of progressive tremor, vertigo, abnormal eye movements and gait instability despite cessation of Nivolumab in August. Unclear etiology at this time - this is a difficult case. Differential includes PSP or MSA - cerebellar subtype - will obtain repeat MRI brain imaging to see if there are any interval radiologic changes that would point towards one or the other diagnoses. Will also obtain MRI C spine given symptoms of diffuse hyperreflexia and sustained clonus in her BLE's. I'm concerned about the patient's abnormal CSF findings that suggest an inflammatory process with lymphocytic predominance; given the patient's cancer history, I'm most concerned about a paraneoplastic process. Could consider repeat LP  this admission. Considered hereditary spastic paraplegia; however, the patient's preserved strength points against this.     # Confusion  # Headache, Vertigo, gait instability  # Reduced visual acuity  # Upper Extremity bilateral tremors  # Dyskinesia / Apraxia  Follows Dr. Delcid in [facility]. She endorses bilateral temporal and occipital headaches with severity of 4/10 being described as dull pain. Additionally endorses mild confusion with start or stop patterns in the mid sentence . She also has neck pain and dizziness since August. Describes vertical diplopia and worse with far away objects. High frequency tremor in the arms and legs with whole body jerks. Her legs feel stiff and weak. She was walking to instability, stiffness and tremor intensity.  She has notable square wave jerks with movement of the eyes, bradykinesia, and hyperreflexia. She went to the ER in October 2023 at which time the head MRI imaging shows a focus of nonenhancing T2 signal abnormality small in nature anterior left temporal lobe either represents chronic ischemia versus posttraumatic. There is no notable brain atrophy. Head CTA negative for stenosis, occlusions or dissection. Prior CSF from LP from December 2023 is largely unrevealing with some lymphocytosis. In December 22 2023 she was her Neurologist and was meant to be started on a 5-day course of high-dose solumedrol however was not due to timing issues. Was started on gabapentin.  - PTA tylenol with good relief of headache  - Head CT w/o contrast  - Repeat MR Brain w/wo contrast  - MR C spine with and without contrast  - PT/OT/SLP    Negative / Normal labs  - Lyme Ig  - Treponema  - CSF VDRL, ACE, meningitis and encephalitis PCR panel,  - TRAN  - ANCA  - Paraneoplastic antibodies              - PCAs              - Neuronoal K son              - CRMP              - ANNAs              - Glial nuclear              - Amphiphysin  - MuSK antibodies  - Myasthenia Ab  - Lambert Eaton  Ab  - SSB / SSA  - ACE  - Myeloma / MGUS  - GQ1B Ab  - Serum Oligoclonal bands  - Copper   - Homocysteine  - Methylmalonic acid  - Flow cytometry without lymphoma or blast population      Abnormal / Elevated labs  - Cadmium / creatinine 1.6  - CSF clear and colorless, nucleated cell count 33, Protein 78, lymphocytosis 84, glucose 53, Kappa light chain 0.15, Oligoclonal bands 2     # 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  - No active interventions  - Sleep study outpateint      # Leukocytosis  WBC 12 on admission  - LA  - UA with reflex culture     # Intermittent nausea  # Constipation  Good relief with stool softeners. No fecal or urinary incontinence. No dysphagia. 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 Lst treatmeent was August 17th 2023 which was prematurely stopped due to increaed neurologic issues as noted.     # Hx of alcohol abuse  - No active interventions     # Rheumatoid arthritis  Previously on methotrexate and Etanercept stopped due to immunomodulatory medication     # Hypothyroidism  - Levothyroxine 50 mcg     # Anxiety  # Depression   - Citalopram 40 mg daily     DVT Prophylaxis: No indication   Code Status: Full Code  FEN: Regular diet    James Acosta M.D., Ph.D.  PGY1  Internal Medicine/Gastroenterology PSTP  [facility]      I saw and examined patient independently, discussed with Dr. Acosta, edited note above as needed to reflect changes of plan.    Patient was also seen and discussed with Dr. Gardiner.    Sam Narvaez MD  PGY1  Neurology   [facility]

## 2024-01-10 NOTE — PROGRESS NOTES
[facility]: [place]  Neurology History and Physical    Patient Name:  Shayy Cortez  MRN:  [id number]    :  1960  Date of Admission:  2024  Date of Service:  2024  Primary care provider:  Thao Ref-Primary, Physician      Assessment:  Shayy Cortez is a 63 year old female with past medical history of melanoma s/p one ethel treatment of nivolumab with development of progressive tremor, vertigo, abnormal eye movements and gait instability despite cessation of Nivolumab in August. Unclear etiology at this time, however, with suspicion for Stiff Person Syndrome. Will trial high dose steroids and repeat LP and send ESTEPHANIA.    # Confusion, resolved  # Intermittent Headaches  # Vertigo  # Gait instability  # Reduced visual acuity  # Upper Extremity bilateral tremors  # Dyskinesia / Apraxia  # Cerebral Atrophy  # Cervical stenosis of C3 - C7  Follows Dr. Delcid in [place]. She endorses bilateral temporal and occipital headaches with severity of 4/10 being described as dull pain. Additionally endorses mild confusion with start or stop patterns in the mid sentence . She also has neck pain and dizziness since August. Describes vertical diplopia and worse with far away objects. High frequency tremor in the arms and legs with whole body jerks. Her legs feel stiff and weak. She was walking to instability, stiffness and tremor intensity.  She has notable square wave jerks with movement of the eyes, bradykinesia, and hyperreflexia. She went to the ER in 2023 at which time the head MRI imaging shows a focus of nonenhancing T2 signal abnormality small in nature anterior left temporal lobe either represents chronic ischemia versus posttraumatic. There is no notable brain atrophy. Head CTA negative for stenosis, occlusions or dissection. Prior CSF from LP from 2023 is largely unrevealing with some lymphocytosis. 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 Was started on gabapentin. Head CT from 1/9 shows no intracranial pathology. MR brain with scattered nonspecific T2/FLAIR hyperintensities within the cerebral white matter most consistent with mild chronic microvascular ischemic change and mild generalized cerebral atrophy. MR cervical spine with moderate spinal canal stenosis at C4-C5 and C5-C6 and moderate neural foraminal stenosis bilaterally at C3-C4, bilaterally at C4-C5, on the left at C5-C6 and on the right at C6-C7.  - PTA tylenol with good relief of headache  - Repeat LP   - Follow up CSF results including ESTEPHANIA  - PT/OT/SLP    Negative / Normal labs  - Lyme Ig  - Treponema  - CSF VDRL, ACE, meningitis and encephalitis PCR panel,  - TRAN  - ANCA  - Paraneoplastic antibodies              - PCAs              - Neuronoal K son              - CRMP              - ANNAs              - Glial nuclear              - Amphiphysin  - MuSK antibodies  - Myasthenia Ab  - Lambert Eaton Ab  - SSB / SSA  - ACE  - Myeloma / MGUS  - GQ1B Ab  - Serum Oligoclonal bands  - Copper   - Homocysteine  - Methylmalonic acid  - Flow cytometry without lymphoma or blast population      Abnormal / Elevated labs  - Cadmium / creatinine 1.6  - CSF clear and colorless, nucleated cell count 33, Protein 78, lymphocytosis 84, glucose 53, Kappa light chain 0.15, Oligoclonal bands 2     # 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  - No active interventions  - Sleep study outpateint      # Leukocytosis, resolved  # Lactic Acidosis, resolved  WBC 12 on admission with lactic acid of 4.1 s/p 2 L of LR with normalization of lactic acid to 1.5. UA not concerning for UTI.  - Follow Blood cultures 1/10     # Intermittent nausea  # Constipation  Good relief with stool softeners. No fecal or urinary incontinence. No dysphagia. 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 Treatment started in November 2022 with last treatmeent in August 17th 2023, prematurely terminated due to increased neurologic issues as noted. She had two remaining doses of Nivolumab.  - Follows with Oncology Kailey Davis     # Rheumatoid arthritis  Previously on methotrexate and Etanercept stopped due to immunomodulatory adjuvant therapy.  - Prednisone 5 mg     # Hypothyroidism  - Levothyroxine 50 mcg     # Anxiety  # Depression   - Citalopram 40 mg daily    # Hx of alcohol abuse  - No active interventions     DVT Prophylaxis: No indication   Code Status: Full Code  FEN: Regular diet    Interval History    Overnight given 2 L LR for elevated lactate of 4.1 with normalisation to 1.6. Blood cultures drawn. Patient status is roughly unchanged from yesterday still with tremors and spasticity. Will get another LP and start trial of high dose steroids.    Physical Exam  Temp:  [98.1  F (36.7  C)-98.8  F (37.1  C)] 98.1  F (36.7  C)  Pulse:  [100-115] 115  Resp:  [16-20] 20  BP: (127-150)/(72-91) 149/81  SpO2:  [90 %-99 %] 97 %    Mental status: Awake, alert, attentive, oriented to self, time, place, and circumstance. Language is fluent and coherent with intact comprehension of complex commands, naming and repetition.  Cranial nerves: VFF, PERRL, conjugate gaze, upper gaze palsy, with occasional square wave jerks, facial sensation intact, face symmetric, shoulder shrug strong, tongue/uvula midline, no dysarthria. No tongue fasciculation, no jaw jerk.  Motor: Normal bulk and slightly increased muscle tone in lower extremities. High frequency tremor, predominantly postural and intentional, with whole body jerks. 5/5 strength bilaterally in deltoids, biceps, triceps, hand , hip flexors, hip extensors, knee flexion, knee extension, plantarflexion, dorsiflexion.   Reflexes: hyperreflexic in biceps, brachioradialis,  triceps, patellae, and achilles. Negative Antony, no clonus, toes down-going.  Sensory: Intact to light touch, pin, vibration, and proprioception  Coordination: FNF and HS without ataxia or dysmetria.  Gait: Impaired. Axial rigidity noted.      Data:  Recent Labs   Lab 01/10/24  0753 01/09/24  1505 01/09/24  1502   WBC 10.1 12.1*  --    HGB 11.0* 12.6  --    MCV 92 92  --     353  --     138  --    POTASSIUM 3.9 3.5  --    CHLORIDE 106 105  --    CO2 21* 19*  --    BUN 12.0 11.2  --    CR 0.90 0.92  --    ANIONGAP 11 14  --    BRIANNA 9.4 9.6  --    * 102* 106*   ALBUMIN  --  4.5  --    PROTTOTAL  --  7.2  --    BILITOTAL  --  0.3  --    ALKPHOS  --  49  --    ALT  --  13  --    AST  --  14  --        Results for orders placed or performed during the hospital encounter of 01/09/24   CT Head w/o Contrast    Impression    Impression:  No acute intracranial pathology. Consider MRI with contrast given  history of melanoma.    I have personally reviewed the examination and initial interpretation  and I agree with the findings.    ROBERT ERICKSON MD         SYSTEM ID:  [id number]   XR Chest 2 Views    Impression    IMPRESSION: Venous catheter. Osteopenia with thoracic spondylosis.    JOHN CHIN MD         SYSTEM ID:  [id number]   MR Brain w/o & w Contrast    Impression    IMPRESSION:  1.  No acute intracranial process.  2.  Generalized brain atrophy and presumed microvascular ischemic changes as detailed above.   MR Cervical Spine w/o & w Contrast    Impression    IMPRESSION:  1.  Diffuse degenerative change of the cervical spine as detailed above.  2.  Moderate spinal canal stenosis at C4-C5 and C5-C6.  3.  Moderate neural foraminal stenosis bilaterally at C3-C4, bilaterally at C4-C5, on the left at C5-C6 and on the right at C6-C7.         James Acosta M.D., Ph.D.  PGY1  Internal Medicine[facility]enterology [facility]  [facility]

## 2024-01-11 ENCOUNTER — APPOINTMENT (OUTPATIENT)
Dept: PHYSICAL THERAPY | Facility: CLINIC | Age: 64
End: 2024-01-11
Payer: COMMERCIAL

## 2024-01-11 LAB
ANION GAP SERPL CALCULATED.3IONS-SCNC: 13 MMOL/L (ref 7–15)
BACTERIA UR CULT: NORMAL
BASOPHILS # BLD AUTO: 0 10E3/UL (ref 0–0.2)
BASOPHILS NFR BLD AUTO: 0 %
BUN SERPL-MCNC: 15.1 MG/DL (ref 8–23)
CALCIUM SERPL-MCNC: 9.6 MG/DL (ref 8.8–10.2)
CERULOPLASMIN SERPL-MCNC: 23 MG/DL (ref 20–60)
CHLORIDE SERPL-SCNC: 104 MMOL/L (ref 98–107)
CREAT SERPL-MCNC: 1.01 MG/DL (ref 0.51–0.95)
CREAT SERPL-MCNC: 1.01 MG/DL (ref 0.51–0.95)
DEPRECATED HCO3 PLAS-SCNC: 21 MMOL/L (ref 22–29)
EGFRCR SERPLBLD CKD-EPI 2021: 63 ML/MIN/1.73M2
EGFRCR SERPLBLD CKD-EPI 2021: 63 ML/MIN/1.73M2
EOSINOPHIL # BLD AUTO: 0.1 10E3/UL (ref 0–0.7)
EOSINOPHIL NFR BLD AUTO: 1 %
ERYTHROCYTE [DISTWIDTH] IN BLOOD BY AUTOMATED COUNT: 13.1 % (ref 10–15)
GLUCOSE SERPL-MCNC: 105 MG/DL (ref 70–99)
HCT VFR BLD AUTO: 32.6 % (ref 35–47)
HGB BLD-MCNC: 10.9 G/DL (ref 11.7–15.7)
IMM GRANULOCYTES # BLD: 0.1 10E3/UL
IMM GRANULOCYTES NFR BLD: 1 %
LYMPHOCYTES # BLD AUTO: 0.9 10E3/UL (ref 0.8–5.3)
LYMPHOCYTES NFR BLD AUTO: 13 %
Lab: NORMAL
MCH RBC QN AUTO: 30.7 PG (ref 26.5–33)
MCHC RBC AUTO-ENTMCNC: 33.4 G/DL (ref 31.5–36.5)
MCV RBC AUTO: 92 FL (ref 78–100)
MONOCYTES # BLD AUTO: 0.4 10E3/UL (ref 0–1.3)
MONOCYTES NFR BLD AUTO: 6 %
NEUTROPHILS # BLD AUTO: 5.2 10E3/UL (ref 1.6–8.3)
NEUTROPHILS NFR BLD AUTO: 79 %
NRBC # BLD AUTO: 0 10E3/UL
NRBC BLD AUTO-RTO: 0 /100
PATH REPORT.COMMENTS IMP SPEC: NORMAL
PATH REPORT.FINAL DX SPEC: NORMAL
PATH REPORT.FINAL DX SPEC: NORMAL
PATH REPORT.GROSS SPEC: NORMAL
PATH REPORT.MICROSCOPIC SPEC OTHER STN: NORMAL
PATH REPORT.MICROSCOPIC SPEC OTHER STN: NORMAL
PATH REPORT.RELEVANT HX SPEC: NORMAL
PATH REPORT.RELEVANT HX SPEC: NORMAL
PERFORMING LABORATORY: NORMAL
PLATELET # BLD AUTO: 322 10E3/UL (ref 150–450)
POTASSIUM SERPL-SCNC: 3.4 MMOL/L (ref 3.4–5.3)
RBC # BLD AUTO: 3.55 10E6/UL (ref 3.8–5.2)
SODIUM SERPL-SCNC: 138 MMOL/L (ref 135–145)
SPECIMEN STATUS: NORMAL
TEST NAME: NORMAL
WBC # BLD AUTO: 6.7 10E3/UL (ref 4–11)

## 2024-01-11 PROCEDURE — 250N000013 HC RX MED GY IP 250 OP 250 PS 637

## 2024-01-11 PROCEDURE — 250N000011 HC RX IP 250 OP 636

## 2024-01-11 PROCEDURE — 99233 SBSQ HOSP IP/OBS HIGH 50: CPT | Mod: GC | Performed by: PSYCHIATRY & NEUROLOGY

## 2024-01-11 PROCEDURE — 36415 COLL VENOUS BLD VENIPUNCTURE: CPT

## 2024-01-11 PROCEDURE — 258N000003 HC RX IP 258 OP 636

## 2024-01-11 PROCEDURE — 97161 PT EVAL LOW COMPLEX 20 MIN: CPT | Mod: GP

## 2024-01-11 PROCEDURE — 80048 BASIC METABOLIC PNL TOTAL CA: CPT

## 2024-01-11 PROCEDURE — 97530 THERAPEUTIC ACTIVITIES: CPT | Mod: GP

## 2024-01-11 PROCEDURE — 85025 COMPLETE CBC W/AUTO DIFF WBC: CPT

## 2024-01-11 PROCEDURE — 120N000002 HC R&B MED SURG/OB UMMC

## 2024-01-11 RX ORDER — HYDROXYZINE HYDROCHLORIDE 25 MG/1
25 TABLET, FILM COATED ORAL EVERY 6 HOURS PRN
Status: DISCONTINUED | OUTPATIENT
Start: 2024-01-11 | End: 2024-01-12

## 2024-01-11 RX ORDER — HYDROXYZINE HYDROCHLORIDE 25 MG/1
50 TABLET, FILM COATED ORAL EVERY 6 HOURS PRN
Status: DISCONTINUED | OUTPATIENT
Start: 2024-01-11 | End: 2024-01-12

## 2024-01-11 RX ORDER — LANOLIN ALCOHOL/MO/W.PET/CERES
3 CREAM (GRAM) TOPICAL
Status: DISCONTINUED | OUTPATIENT
Start: 2024-01-11 | End: 2024-01-18 | Stop reason: HOSPADM

## 2024-01-11 RX ORDER — POLYETHYLENE GLYCOL 3350 17 G/17G
17 POWDER, FOR SOLUTION ORAL DAILY PRN
Status: DISCONTINUED | OUTPATIENT
Start: 2024-01-11 | End: 2024-01-13

## 2024-01-11 RX ORDER — ACETAMINOPHEN 325 MG/1
650 TABLET ORAL EVERY 24 HOURS
Status: COMPLETED | OUTPATIENT
Start: 2024-01-12 | End: 2024-01-14

## 2024-01-11 RX ORDER — DIPHENHYDRAMINE HYDROCHLORIDE 50 MG/ML
50 INJECTION INTRAMUSCULAR; INTRAVENOUS EVERY 24 HOURS
Status: COMPLETED | OUTPATIENT
Start: 2024-01-12 | End: 2024-01-14

## 2024-01-11 RX ORDER — DIPHENHYDRAMINE HCL 50 MG
50 CAPSULE ORAL EVERY 24 HOURS
Status: COMPLETED | OUTPATIENT
Start: 2024-01-12 | End: 2024-01-14

## 2024-01-11 RX ORDER — ENOXAPARIN SODIUM 100 MG/ML
40 INJECTION SUBCUTANEOUS EVERY 24 HOURS
Status: DISCONTINUED | OUTPATIENT
Start: 2024-01-11 | End: 2024-01-18 | Stop reason: HOSPADM

## 2024-01-11 RX ADMIN — GABAPENTIN 300 MG: 300 CAPSULE ORAL at 22:31

## 2024-01-11 RX ADMIN — HUMAN IMMUNOGLOBULIN G 20 G: 20 LIQUID INTRAVENOUS at 18:23

## 2024-01-11 RX ADMIN — SENNOSIDES AND DOCUSATE SODIUM 2 TABLET: 8.6; 5 TABLET ORAL at 17:30

## 2024-01-11 RX ADMIN — CITALOPRAM 40 MG: 40 TABLET ORAL at 09:20

## 2024-01-11 RX ADMIN — ACETAMINOPHEN 650 MG: 325 TABLET, FILM COATED ORAL at 09:25

## 2024-01-11 RX ADMIN — DIPHENHYDRAMINE HYDROCHLORIDE 50 MG: 50 CAPSULE ORAL at 17:30

## 2024-01-11 RX ADMIN — SODIUM CHLORIDE 1000 MG: 9 INJECTION, SOLUTION INTRAVENOUS at 09:18

## 2024-01-11 RX ADMIN — ENOXAPARIN SODIUM 40 MG: 40 INJECTION SUBCUTANEOUS at 17:04

## 2024-01-11 RX ADMIN — HYDROXYZINE HYDROCHLORIDE 25 MG: 25 TABLET, FILM COATED ORAL at 01:05

## 2024-01-11 RX ADMIN — PANTOPRAZOLE SODIUM 40 MG: 40 TABLET, DELAYED RELEASE ORAL at 09:20

## 2024-01-11 RX ADMIN — LEVOTHYROXINE SODIUM 50 MCG: 0.05 TABLET ORAL at 09:20

## 2024-01-11 RX ADMIN — HYDROXYZINE HYDROCHLORIDE 50 MG: 25 TABLET, FILM COATED ORAL at 22:31

## 2024-01-11 ASSESSMENT — ACTIVITIES OF DAILY LIVING (ADL)
ADLS_ACUITY_SCORE: 35
ADLS_ACUITY_SCORE: 36
ADLS_ACUITY_SCORE: 35

## 2024-01-11 NOTE — ED NOTES
"[facility]   ED Nurse to Floor Handoff     Shayy Cortez is a 63 year old female who speaks English and lives with family members,  in a home  They arrived in the ED by unknown from home    ED Chief Complaint: Shaking  Patient presents to triage for evaluation of shaking and inability to walk. Patient is tremulous in triage, and is not able to walk without assistance. Patient also endorses dizziness, denies n/v currently. Patient was referred to ED by neurology for admission and further workup.      Triage Assessment (Adult)       Row Name 01/09/24 1418          Triage Assessment    Airway WDL WDL        Respiratory WDL    Respiratory WDL WDL        Skin Circulation/Temperature WDL    Skin Circulation/Temperature WDL WDL        Cardiac WDL    Cardiac WDL WDL        Peripheral/Neurovascular WDL    Peripheral Neurovascular WDL WDL        Cognitive/Neuro/Behavioral WDL    Cognitive/Neuro/Behavioral WDL WDL                  BP (!) 145/83   Pulse 96   Temp 98.4  F (36.9  C) (Oral)   Resp 16   Ht 1.575 m (5' 2.01\")   SpO2 97%   BMI 19.02 kg/m     ED Dx;   Final diagnoses:   Shaking   Generalized weakness   Unable to ambulate         Needed?: No    Allergies: No Known Allergies.  Past Medical Hx: History reviewed. No pertinent past medical history.   Baseline Mental status: WDL  Current Mental Status changes: at basesline    Infection present or suspected this encounter: no  Sepsis suspected: No  Isolation type: No active isolations  Patient tested for COVID 19 prior to admission: NO     Activity level - Baseline/Home:  Independent  Activity Level - Current:   Stand with assist x2    Bariatric equipment needed?: No    In the ED these meds were given:   Medications   citalopram (celeXA) tablet 40 mg (40 mg Oral $Given 1/11/24 0920)   gabapentin (NEURONTIN) capsule 300 mg (300 mg Oral $Given 1/10/24 2151)   levothyroxine (SYNTHROID/LEVOTHROID) tablet 50 mcg (50 " mcg Oral $Given 1/11/24 0920)   meclizine (ANTIVERT) tablet 25 mg (has no administration in time range)   metoclopramide (REGLAN) tablet 10 mg (has no administration in time range)   pantoprazole (PROTONIX) EC tablet 40 mg (40 mg Oral $Given 1/11/24 0920)   ondansetron (ZOFRAN ODT) ODT tab 4 mg (has no administration in time range)   predniSONE (DELTASONE) tablet 5 mg ( Oral Automatically Held 1/15/24 0800)   prochlorperazine (COMPAZINE) tablet 10 mg (has no administration in time range)   lidocaine 1 % 0.1-1 mL (has no administration in time range)   lidocaine (LMX4) cream (has no administration in time range)   sodium chloride (PF) 0.9% PF flush 3 mL (3 mLs Intracatheter $Given 1/11/24 0920)   sodium chloride (PF) 0.9% PF flush 3 mL (has no administration in time range)   senna-docusate (SENOKOT-S/PERICOLACE) 8.6-50 MG per tablet 1 tablet (has no administration in time range)     Or   senna-docusate (SENOKOT-S/PERICOLACE) 8.6-50 MG per tablet 2 tablet (has no administration in time range)   calcium carbonate (TUMS) chewable tablet 1,000 mg (has no administration in time range)   acetaminophen (TYLENOL) tablet 975 mg (975 mg Oral $Given 1/10/24 0946)   diphenhydrAMINE (BENADRYL) capsule 50 mg (has no administration in time range)     Or   diphenhydrAMINE (BENADRYL) injection 50 mg (has no administration in time range)   acetaminophen (TYLENOL) tablet 650 mg (650 mg Oral $Given 1/11/24 0925)   immune globulin - sucrose free 10 % injection 20 g (0 g Intravenous Stopped 1/10/24 2200)   EPINEPHrine (ADRENALIN) kit 0.3 mg (has no administration in time range)   diphenhydrAMINE (BENADRYL) injection 50 mg (has no administration in time range)   methylPREDNISolone sodium succinate (solu-MEDROL) injection 125 mg (has no administration in time range)   famotidine (PEPCID) injection 20 mg (has no administration in time range)   methylPREDNISolone sodium succinate (solu-MEDROL) 1,000 mg in sodium chloride 0.9 % 283 mL  intermittent infusion (0 mg Intravenous Stopped 1/11/24 1034)   hydrOXYzine HCl (ATARAX) tablet 25 mg (25 mg Oral $Given 1/11/24 0105)     Or   hydrOXYzine HCl (ATARAX) tablet 50 mg ( Oral See Alternative 1/11/24 0105)   hydrOXYzine HCl (ATARAX) tablet 25 mg (has no administration in time range)     Or   hydrOXYzine HCl (ATARAX) tablet 50 mg (has no administration in time range)   lactated ringers BOLUS 1,000 mL (0 mLs Intravenous Stopped 1/10/24 0213)   gadobutrol (GADAVIST) injection 7.5 mL (5 mLs Intravenous $Given 1/9/24 2115)   lactated ringers BOLUS 1,000 mL (0 mLs Intravenous Stopped 1/10/24 1648)   LORazepam (ATIVAN) injection 1 mg (1 mg Intravenous $Given 1/10/24 1304)   sodium chloride 0.9% BOLUS 500 mL (0 mLs Intravenous Stopped 1/10/24 1949)   acetaminophen (TYLENOL) tablet 650 mg (650 mg Oral $Given 1/10/24 1804)   diphenhydrAMINE (BENADRYL) capsule 50 mg (50 mg Oral $Given 1/10/24 1804)     Or   diphenhydrAMINE (BENADRYL) injection 50 mg ( Intravenous See Alternative 1/10/24 1804)       Drips running?  Yes, tko port    Home pump  No    Current LDAs  Port a Cath 01/18/23 Left Chest wall (Active)   Site Assessment WDL 01/11/24 0000   Dressing Chlorhexidine disk 01/11/24 0000   Dressing Status clean;dry;intact 01/11/24 0000   Line Necessity Yes, meets criteria 01/11/24 0000   Phlebitis Scale 0-->no symptoms 01/11/24 0000   Infiltration? no 01/11/24 0000   Number of days: 358       Labs results:   Labs Ordered and Resulted from Time of ED Arrival to Time of ED Departure   COMPREHENSIVE METABOLIC PANEL - Abnormal       Result Value    Sodium 138      Potassium 3.5      Carbon Dioxide (CO2) 19 (*)     Anion Gap 14      Urea Nitrogen 11.2      Creatinine 0.92      GFR Estimate 70      Calcium 9.6      Chloride 105      Glucose 102 (*)     Alkaline Phosphatase 49      AST 14      ALT 13      Protein Total 7.2      Albumin 4.5      Bilirubin Total 0.3     LACTIC ACID WHOLE BLOOD - Abnormal    Lactic Acid 2.2  (*)    ROUTINE UA WITH MICROSCOPIC REFLEX TO CULTURE - Abnormal    Color Urine Light Yellow      Appearance Urine Clear      Glucose Urine Negative      Bilirubin Urine Negative      Ketones Urine Negative      Specific Gravity Urine 1.012      Blood Urine Negative      pH Urine 6.5      Protein Albumin Urine Negative      Urobilinogen Urine Normal      Nitrite Urine Negative      Leukocyte Esterase Urine Negative      Bacteria Urine Few (*)     Mucus Urine Present (*)     RBC Urine 1      WBC Urine 1      Squamous Epithelials Urine <1      Transitional Epithelials Urine <1     CBC WITH PLATELETS AND DIFFERENTIAL - Abnormal    WBC Count 12.1 (*)     RBC Count 4.06      Hemoglobin 12.6      Hematocrit 37.2      MCV 92      MCH 31.0      MCHC 33.9      RDW 13.1      Platelet Count 353      % Neutrophils 73      % Lymphocytes 18      % Monocytes 7      % Eosinophils 1      % Basophils 0      % Immature Granulocytes 1      NRBCs per 100 WBC 0      Absolute Neutrophils 8.8 (*)     Absolute Lymphocytes 2.2      Absolute Monocytes 0.9      Absolute Eosinophils 0.1      Absolute Basophils 0.0      Absolute Immature Granulocytes 0.1      Absolute NRBCs 0.0     GLUCOSE BY METER - Abnormal    GLUCOSE BY METER POCT 106 (*)    LACTIC ACID WHOLE BLOOD - Abnormal    Lactic Acid 4.1 (*)    LACTIC ACID WHOLE BLOOD - Abnormal    Lactic Acid 3.7 (*)    BASIC METABOLIC PANEL - Abnormal    Sodium 138      Potassium 3.9      Chloride 106      Carbon Dioxide (CO2) 21 (*)     Anion Gap 11      Urea Nitrogen 12.0      Creatinine 0.90      GFR Estimate 72      Calcium 9.4      Glucose 104 (*)    CBC WITH PLATELETS AND DIFFERENTIAL - Abnormal    WBC Count 10.1      RBC Count 3.54 (*)     Hemoglobin 11.0 (*)     Hematocrit 32.4 (*)     MCV 92      MCH 31.1      MCHC 34.0      RDW 13.1      Platelet Count 319      % Neutrophils 80      % Lymphocytes 13      % Monocytes 6      % Eosinophils 0      % Basophils 0      % Immature Granulocytes 1       NRBCs per 100 WBC 0      Absolute Neutrophils 8.1      Absolute Lymphocytes 1.3      Absolute Monocytes 0.6      Absolute Eosinophils 0.0      Absolute Basophils 0.0      Absolute Immature Granulocytes 0.1      Absolute NRBCs 0.0     ROUTINE UA WITH MICROSCOPIC REFLEX TO CULTURE - Abnormal    Color Urine Straw      Appearance Urine Clear      Glucose Urine Negative      Bilirubin Urine Negative      Ketones Urine Negative      Specific Gravity Urine 1.006      Blood Urine Negative      pH Urine 6.5      Protein Albumin Urine Negative      Urobilinogen Urine Normal      Nitrite Urine Negative      Leukocyte Esterase Urine Moderate (*)     Bacteria Urine Few (*)     RBC Urine 1      WBC Urine 5      Squamous Epithelials Urine 1      Transitional Epithelials Urine 1     PROTEIN TOTAL CSF - Abnormal    Protein total CSF 85.2 (*)    CELL COUNT CSF - Abnormal    Tube Number 4      Color Colorless      Clarity Clear      Total Nucleated Cells 32 (*)     RBC Count 2     BASIC METABOLIC PANEL - Abnormal    Sodium 138      Potassium 3.4      Chloride 104      Carbon Dioxide (CO2) 21 (*)     Anion Gap 13      Urea Nitrogen 15.1      Creatinine 1.01 (*)     GFR Estimate 63      Calcium 9.6      Glucose 105 (*)    CBC WITH PLATELETS AND DIFFERENTIAL - Abnormal    WBC Count 6.7      RBC Count 3.55 (*)     Hemoglobin 10.9 (*)     Hematocrit 32.6 (*)     MCV 92      MCH 30.7      MCHC 33.4      RDW 13.1      Platelet Count 322      % Neutrophils 79      % Lymphocytes 13      % Monocytes 6      % Eosinophils 1      % Basophils 0      % Immature Granulocytes 1      NRBCs per 100 WBC 0      Absolute Neutrophils 5.2      Absolute Lymphocytes 0.9      Absolute Monocytes 0.4      Absolute Eosinophils 0.1      Absolute Basophils 0.0      Absolute Immature Granulocytes 0.1      Absolute NRBCs 0.0     MAGNESIUM - Normal    Magnesium 2.2     TSH WITH FREE T4 REFLEX - Normal    TSH 3.34     TROPONIN T, HIGH SENSITIVITY - Normal    Troponin  "T, High Sensitivity 8     INFLUENZA A/B, RSV, & SARS-COV2 PCR - Normal    Influenza A PCR Negative      Influenza B PCR Negative      RSV PCR Negative      SARS CoV2 PCR Negative     CERULOPLASMIN - Normal    Ceruloplasmin 23     LACTIC ACID WHOLE BLOOD - Normal    Lactic Acid 1.6     LACTIC ACID WHOLE BLOOD - Normal    Lactic Acid 1.5     GLUCOSE CSF - Normal    Glucose CSF 54     BLOOD CULTURE - Normal    Culture No growth after 12 hours     BLOOD CULTURE - Normal    Culture No growth after 12 hours     LABORATORY MISCELLANEOUS ORDER    See Scanned Result        Value: Specimen received. Reordered and sent to performing laboratory. Report to follow up on completion.    Performing Laboratory [facility]      Test Name        Value: Movement Disorder, Autoimmune/Paraneoplastic Evaluation, Spinal Fluid    Test Code MDC2     DIFFERENTIAL CSF    % Neutrophils        % Lymphocytes 91      % Monocytes/Macrophages 9     GLUCOSE MONITOR NURSING POCT   GLUTAMIC ACID DECARBOXYLASE ANTIBODY   LABORATORY MISCELLANEOUS ORDER   [place] MISCELLANEOUS TEST   LABORATORY MISCELLANEOUS ORDER   NON-GYNECOLOGIC CYTOLOGY   FLOW CYTOMETRY   URINE CULTURE    Culture <10,000 CFU/mL Mixture of Urogenital Radha     AEROBIC BACTERIAL CULTURE ROUTINE    Gram Stain Result No organisms seen      Gram Stain Result 3+ WBC seen     CELL COUNT WITH DIFFERENTIAL CSF       Imaging Studies: No results found for this or any previous visit (from the past 24 hour(s)).    Recent vital signs:   BP (!) 145/83   Pulse 96   Temp 98.4  F (36.9  C) (Oral)   Resp 16   Ht 1.575 m (5' 2.01\")   SpO2 97%   BMI 19.02 kg/m      [place] Coma Scale Score: 15 (01/11/24 0734)  Assessment Qualifiers: patient not sedated/intubated    Cardiac Rhythm: Normal Sinus  Pt needs tele? no  Skin/wound Issues: None    Code Status: Full Code    Pain control: pt had none    Nausea control: pt had none    Abnormal labs/tests/findings requiring intervention:   Labs " Ordered and Resulted from Time of ED Arrival to Time of ED Departure   COMPREHENSIVE METABOLIC PANEL - Abnormal       Result Value    Sodium 138      Potassium 3.5      Carbon Dioxide (CO2) 19 (*)     Anion Gap 14      Urea Nitrogen 11.2      Creatinine 0.92      GFR Estimate 70      Calcium 9.6      Chloride 105      Glucose 102 (*)     Alkaline Phosphatase 49      AST 14      ALT 13      Protein Total 7.2      Albumin 4.5      Bilirubin Total 0.3     LACTIC ACID WHOLE BLOOD - Abnormal    Lactic Acid 2.2 (*)    ROUTINE UA WITH MICROSCOPIC REFLEX TO CULTURE - Abnormal    Color Urine Light Yellow      Appearance Urine Clear      Glucose Urine Negative      Bilirubin Urine Negative      Ketones Urine Negative      Specific Gravity Urine 1.012      Blood Urine Negative      pH Urine 6.5      Protein Albumin Urine Negative      Urobilinogen Urine Normal      Nitrite Urine Negative      Leukocyte Esterase Urine Negative      Bacteria Urine Few (*)     Mucus Urine Present (*)     RBC Urine 1      WBC Urine 1      Squamous Epithelials Urine <1      Transitional Epithelials Urine <1     CBC WITH PLATELETS AND DIFFERENTIAL - Abnormal    WBC Count 12.1 (*)     RBC Count 4.06      Hemoglobin 12.6      Hematocrit 37.2      MCV 92      MCH 31.0      MCHC 33.9      RDW 13.1      Platelet Count 353      % Neutrophils 73      % Lymphocytes 18      % Monocytes 7      % Eosinophils 1      % Basophils 0      % Immature Granulocytes 1      NRBCs per 100 WBC 0      Absolute Neutrophils 8.8 (*)     Absolute Lymphocytes 2.2      Absolute Monocytes 0.9      Absolute Eosinophils 0.1      Absolute Basophils 0.0      Absolute Immature Granulocytes 0.1      Absolute NRBCs 0.0     GLUCOSE BY METER - Abnormal    GLUCOSE BY METER POCT 106 (*)    LACTIC ACID WHOLE BLOOD - Abnormal    Lactic Acid 4.1 (*)    LACTIC ACID WHOLE BLOOD - Abnormal    Lactic Acid 3.7 (*)    BASIC METABOLIC PANEL - Abnormal    Sodium 138      Potassium 3.9      Chloride  106      Carbon Dioxide (CO2) 21 (*)     Anion Gap 11      Urea Nitrogen 12.0      Creatinine 0.90      GFR Estimate 72      Calcium 9.4      Glucose 104 (*)    CBC WITH PLATELETS AND DIFFERENTIAL - Abnormal    WBC Count 10.1      RBC Count 3.54 (*)     Hemoglobin 11.0 (*)     Hematocrit 32.4 (*)     MCV 92      MCH 31.1      MCHC 34.0      RDW 13.1      Platelet Count 319      % Neutrophils 80      % Lymphocytes 13      % Monocytes 6      % Eosinophils 0      % Basophils 0      % Immature Granulocytes 1      NRBCs per 100 WBC 0      Absolute Neutrophils 8.1      Absolute Lymphocytes 1.3      Absolute Monocytes 0.6      Absolute Eosinophils 0.0      Absolute Basophils 0.0      Absolute Immature Granulocytes 0.1      Absolute NRBCs 0.0     ROUTINE UA WITH MICROSCOPIC REFLEX TO CULTURE - Abnormal    Color Urine Straw      Appearance Urine Clear      Glucose Urine Negative      Bilirubin Urine Negative      Ketones Urine Negative      Specific Gravity Urine 1.006      Blood Urine Negative      pH Urine 6.5      Protein Albumin Urine Negative      Urobilinogen Urine Normal      Nitrite Urine Negative      Leukocyte Esterase Urine Moderate (*)     Bacteria Urine Few (*)     RBC Urine 1      WBC Urine 5      Squamous Epithelials Urine 1      Transitional Epithelials Urine 1     PROTEIN TOTAL CSF - Abnormal    Protein total CSF 85.2 (*)    CELL COUNT CSF - Abnormal    Tube Number 4      Color Colorless      Clarity Clear      Total Nucleated Cells 32 (*)     RBC Count 2     BASIC METABOLIC PANEL - Abnormal    Sodium 138      Potassium 3.4      Chloride 104      Carbon Dioxide (CO2) 21 (*)     Anion Gap 13      Urea Nitrogen 15.1      Creatinine 1.01 (*)     GFR Estimate 63      Calcium 9.6      Glucose 105 (*)    CBC WITH PLATELETS AND DIFFERENTIAL - Abnormal    WBC Count 6.7      RBC Count 3.55 (*)     Hemoglobin 10.9 (*)     Hematocrit 32.6 (*)     MCV 92      MCH 30.7      MCHC 33.4      RDW 13.1      Platelet Count 322       % Neutrophils 79      % Lymphocytes 13      % Monocytes 6      % Eosinophils 1      % Basophils 0      % Immature Granulocytes 1      NRBCs per 100 WBC 0      Absolute Neutrophils 5.2      Absolute Lymphocytes 0.9      Absolute Monocytes 0.4      Absolute Eosinophils 0.1      Absolute Basophils 0.0      Absolute Immature Granulocytes 0.1      Absolute NRBCs 0.0     MAGNESIUM - Normal    Magnesium 2.2     TSH WITH FREE T4 REFLEX - Normal    TSH 3.34     TROPONIN T, HIGH SENSITIVITY - Normal    Troponin T, High Sensitivity 8     INFLUENZA A/B, RSV, & SARS-COV2 PCR - Normal    Influenza A PCR Negative      Influenza B PCR Negative      RSV PCR Negative      SARS CoV2 PCR Negative     CERULOPLASMIN - Normal    Ceruloplasmin 23     LACTIC ACID WHOLE BLOOD - Normal    Lactic Acid 1.6     LACTIC ACID WHOLE BLOOD - Normal    Lactic Acid 1.5     GLUCOSE CSF - Normal    Glucose CSF 54     BLOOD CULTURE - Normal    Culture No growth after 12 hours     BLOOD CULTURE - Normal    Culture No growth after 12 hours     LABORATORY MISCELLANEOUS ORDER    See Scanned Result        Value: Specimen received. Reordered and sent to performing laboratory. Report to follow up on completion.    Performing Laboratory [facility]      Test Name        Value: Movement Disorder, Autoimmune/Paraneoplastic Evaluation, Spinal Fluid    Test Code MDC2     DIFFERENTIAL CSF    % Neutrophils        % Lymphocytes 91      % Monocytes/Macrophages 9     GLUCOSE MONITOR NURSING POCT   GLUTAMIC ACID DECARBOXYLASE ANTIBODY   LABORATORY MISCELLANEOUS ORDER   [place] MISCELLANEOUS TEST   LABORATORY MISCELLANEOUS ORDER   NON-GYNECOLOGIC CYTOLOGY   FLOW CYTOMETRY   URINE CULTURE    Culture <10,000 CFU/mL Mixture of Urogenital Radha     AEROBIC BACTERIAL CULTURE ROUTINE    Gram Stain Result No organisms seen      Gram Stain Result 3+ WBC seen     CELL COUNT WITH DIFFERENTIAL CSF      MR Cervical Spine w/o & w Contrast   Final Result   IMPRESSION:    1.  Diffuse degenerative change of the cervical spine as detailed above.   2.  Moderate spinal canal stenosis at C4-C5 and C5-C6.   3.  Moderate neural foraminal stenosis bilaterally at C3-C4, bilaterally at C4-C5, on the left at C5-C6 and on the right at C6-C7.         MR Brain w/o & w Contrast   Final Result   IMPRESSION:   1.  No acute intracranial process.   2.  Generalized brain atrophy and presumed microvascular ischemic changes as detailed above.      XR Chest 2 Views   Final Result   IMPRESSION: Venous catheter. Osteopenia with thoracic spondylosis.      JOHN CHIN MD            SYSTEM ID:  [id number]      CT Head w/o Contrast   Final Result   Impression:   No acute intracranial pathology. Consider MRI with contrast given   history of melanoma.      I have personally reviewed the examination and initial interpretation   and I agree with the findings.      ROBERT ERICKSON MD            SYSTEM ID:  [id number]           Family present during ED course? Yes   Family Comments/Social Situation comments:      Tasks needing completion:  see order    Cece Rodriguez, RN      [phone] [facility]

## 2024-01-11 NOTE — PROGRESS NOTES
"ED PT [place]     01/11/24 1500   Appointment Info   Signing Clinician's Name / Credentials (PT) Dimitry Hernandez, PT, DPT   Living Environment   People in Home significant other  (Jorge)   Current Living Arrangements house   Home Accessibility stairs to enter home   Number of Stairs, Main Entrance 4   Stair Railings, Main Entrance railings safe and in good condition   Transportation Anticipated family or friend will provide   Living Environment Comments Patient lives in mobile home with partner, Jorge. Patient has ~4 CARMEN and support from Jorge when he is not away at work.   Self-Care   Usual Activity Tolerance moderate   Current Activity Tolerance poor   Regular Exercise No   Equipment Currently Used at Home walker, rolling   Activity/Exercise/Self-Care Comment Patient has not worked for the past year while undergoing chemo. Has had impaired mobility since August but has still been able to get around and perform self cares mod I with FWW support. As of the past couple days patient has been unable to stand.   General Information   Onset of Illness/Injury or Date of Surgery 01/11/24   Referring Physician Sam Narvaez MD   Patient/Family Therapy Goals Statement (PT) To return home   Pertinent History of Current Problem (include personal factors and/or comorbidities that impact the POC) Per EMR \"Patient slept well after receiving x1 hydroxyzine 25mg for anxiety - she would like to receive this every night. She also received her first dose of IVIG yesterday. She still feels very unsteady and is using the bedpan instead of walking to the bathroom. Endorses jerks/stiffness. Denies headache, nausea/vomiting, or rash.\"   Existing Precautions/Restrictions fall   General Observations activity: up with assist   Cognition   Affect/Mental Status (Cognition) WFL   Range of Motion (ROM)   Range of Motion ROM is WFL   Strength (Manual Muscle Testing)   Strength (Manual Muscle Testing) strength is WFL   Strength Comments functional but with " poor muscular endurance, deconditioned   Bed Mobility   Bed Mobility sit-supine;supine-sit   Supine-Sit Bartholomew (Bed Mobility) independent   Sit-Supine Bartholomew (Bed Mobility) independent   Transfers   Transfers sit-stand transfer   Sit-Stand Transfer   Sit-Stand Bartholomew (Transfers) minimum assist (75% patient effort)   Assistive Device (Sit-Stand Transfers) walker, front-wheeled   Comment, (Sit-Stand Transfer) min A for balance support at FWW   Gait/Stairs (Locomotion)   Comment, (Gait/Stairs) attempted initiation but with poor tolerance, wide base gait, ataxic   Balance   Balance Comments termulous throughout with intermittent jerking worsened with any surprise or loud noise   Coordination   Coordination Comments intact but limited by tremor   Muscle Tone   Muscle Tone Comments increased LE tone   Clinical Impression   Criteria for Skilled Therapeutic Intervention Yes, treatment indicated   PT Diagnosis (PT) impaired functional mobility   Influenced by the following impairments impaired activity tolerance, impaired functional coordination   Functional limitations due to impairments transfers, gait, stairs   Clinical Presentation (PT Evaluation Complexity) stable   Clinical Presentation Rationale clinical judgement   Clinical Decision Making (Complexity) low complexity   Planned Therapy Interventions (PT) balance training;bed mobility training;gait training;postural re-education;ROM (range of motion);stair training;strengthening;stretching;transfer training   Risk & Benefits of therapy have been explained evaluation/treatment results reviewed;care plan/treatment goals reviewed;risks/benefits reviewed;current/potential barriers reviewed;participants voiced agreement with care plan;participants included;patient   PT Total Evaluation Time   PT Fatmata Low Complexity Minutes (87621) 10   Physical Therapy Goals   PT Frequency 5x/week   PT Predicted Duration/Target Date for Goal Attainment 01/25/24   PT Goals  Bed Mobility;Transfers;Gait;Stairs   PT: Bed Mobility Independent;Supine to/from sit   PT: Transfers Sit to/from stand;Modified independent;Assistive device   PT: Gait Modified independent;Assistive device;Greater than 200 feet   PT: Stairs Modified independent;Assistive device;4 stairs   PT Discharge Planning   PT Plan transfers, gait as tolerated   PT Discharge Recommendation (DC Rec) [facility] patient will benefit from intensive, interdisciplinary therapy.  Anticipate will be able to tolerate 3 hours of therapy per day   PT Rationale for DC Rec At this time patient presents with profound mobility deficits. Currently recommend ARU to maximize IND for goal of eventual safe return home. Patient expresses preference to return home with home support to cover the time that her partner Jorge is at work.   PT Brief overview of current status Ax1 for pivot transfer from bed>commode with gait belt and FWW   Total Session Time   Total Session Time (sum of timed and untimed services) 10       Dimitry Hernandez, PT, DPT  Pager #[phone]

## 2024-01-11 NOTE — ED NOTES
Explained to pt needs to work with pt so they can direct nursing on safe ambulation. Pt agreed and will work with therapy this afternoon.

## 2024-01-11 NOTE — PROGRESS NOTES
"[facility]  General Neurology - Progress Note    Patient Name:  Shayy Cortez  Date of Service:  January 11, 2024    Subjective:    Patient slept well after receiving x1 hydroxyzine 25mg for anxiety - she would like to receive this every night. She also received her first dose of IVIG yesterday. She still feels very unsteady and is using the bedpan instead of walking to the bathroom. Endorses jerks/stiffness. Denies headache, nausea/vomiting, or rash.     Objective:    Vitals: BP (!) 146/85   Pulse 96   Temp 98.4  F (36.9  C) (Oral)   Resp 16   Ht 1.575 m (5' 2.01\")   SpO2 96%   BMI 19.02 kg/m    General: Lying in bed, NAD  Head: Atraumatic, normocephalic   Cardiac: no lower extremity edema  Neuro:  Mental status: Awake, alert, attentive, oriented to self, time, place, and circumstance. Language is fluent and coherent with intact comprehension of complex commands, naming and repetition.  Cranial nerves: VFF, PERRL, conjugate gaze, upper gaze palsy, with occasional square wave jerks, facial sensation intact, face symmetric, shoulder shrug strong, tongue/uvula midline, no dysarthria. No tongue fasciculation. Jaw jerk reflex present.  Motor: Normal bulk and slightly increased muscle tone in lower extremities. High frequency tremor, predominantly postural and intentional, with whole body jerks. 5/5 strength bilaterally in deltoids, biceps, triceps, hand , hip flexors, hip extensors, knee flexion, knee extension, plantarflexion, dorsiflexion.   Reflexes: Hyperreflexic (3+) in biceps, brachioradialis, triceps, patellae and achilles. Negative Antony and toes down-going. Sustained ankle clonus present bilaterally.  Sensory: Intact to light touch, pin, vibration, and proprioception.  Coordination: FNF and HS without ataxia or dysmetria.  Gait: Deferred due to weakness and unsteadiness.    Pertinent Investigations:    I have personally reviewed most recent and pertinent labs, " tests, and radiological images.     Assessment & Plan:  Shayy Cortez is a 63 year old female with past medical history of melanoma s/p one year treatment of nivolumab with development of progressive tremor, vertigo, abnormal eye movements and gait instability despite cessation of Nivolumab in August. Unclear etiology at this time, however, with suspicion for Stiff Person Syndrome. Parkinsons plus has been considered however it is progressing quickly and patient does not have a lot of PD signs. Completed a tap on 1/10/21 revealing elevated protein and lymphocytosis. Awaiting ESTEPHANIA 65, DPPX, and glycine antibody results. Trialing high dose steroids and IVIG.      # Confusion, resolved  # Intermittent Headaches  # Vertigo  # Gait instability  # Reduced visual acuity  # Upper Extremity bilateral tremors  # Dyskinesia / Apraxia  # Cerebral Atrophy  # Cervical stenosis of C3 - C7  Follows Dr. Delcid in [facility]. She endorses bilateral temporal and occipital headaches with severity of 4/10 being described as dull pain. Additionally endorses mild confusion with start or stop patterns in the mid sentence. She also has neck pain and dizziness since August. Describes vertical diplopia and worse with far away objects. High frequency tremor in the arms and legs with whole body jerks. Her legs feel stiff and weak. She was walking to instability, stiffness and tremor intensity.  She has notable square wave jerks with movement of the eyes, bradykinesia, and hyperreflexia. She went to the ER in October 2023 at which time the head MRI imaging shows a focus of nonenhancing T2 signal abnormality small in nature anterior left temporal lobe either represents chronic ischemia versus posttraumatic. There is no notable brain atrophy. Head CTA negative for stenosis, occlusions or dissection. Prior CSF from LP from December 2023 is largely unrevealing with some lymphocytosis. In December 22 2023 she was her Neurologist and was meant to  be started on a 5-day course of high-dose solumedrol however was not due to timing issues. Was started on gabapentin. Head CT from 1/9 shows no intracranial pathology. MR brain with scattered nonspecific T2/FLAIR hyperintensities within the cerebral white matter most consistent with mild chronic microvascular ischemic change and mild generalized cerebral atrophy. MR cervical spine with moderate spinal canal stenosis at C4-C5 and C5-C6 and moderate neural foraminal stenosis bilaterally at C3-C4, bilaterally at C4-C5, on the left at C5-C6 and on the right at C6-C7. Repeat LP inpatient revealed similar results to outpatient labs with elevated protein and lymphocytosis, further supporting an autoimmune process. Considering severity of symptoms, favor starting IVIG and steroids.   - Started IVIG 20g on 1/10/23 - plan for 5 days (last day 1/14)  - Started solumedrol 1000mg on 1/11/23 - plan for at least 3 days  - PTA tylenol with good relief of headache  - Follow-up ESTEPHANIA 65, DPPX and glycine antibodies - pending  - Orthostatics today  - PT/OT/SLP    Negative / Normal labs  - Lyme Ig  - Treponema  - CSF VDRL, ACE, meningitis and encephalitis PCR panel,  - TRAN  - ANCA  - Paraneoplastic antibodies              - PCAs              - Neuronoal K son              - CRMP              - ANNAs              - Glial nuclear              - Amphiphysin  - MuSK antibodies  - Myasthenia Ab  - Lambert Eaton Ab  - SSB / SSA  - ACE  - Myeloma / MGUS  - GQ1B Ab  - Serum Oligoclonal bands  - Copper   - Homocysteine  - Methylmalonic acid  - Flow cytometry without lymphoma or blast population      Abnormal / Elevated labs  - Cadmium / creatinine 1.6  - CSF 12/14/23 clear and colorless, nucleated cell count 33, Protein 78, lymphocytosis 84, glucose 53, Kappa light chain 0.15, Oligoclonal bands 2  - CSF 1/11/23 clear and colorless, nucleated cell count 32, protein 85.2, lymphocytosis 91, glucose 54     # 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  - No active interventions  - Sleep study outpateint      # Leukocytosis, resolved  # Lactic Acidosis, resolved  WBC 12 on admission with lactic acid of 4.1 s/p 2 L of LR with normalization of lactic acid to 1.5. UA not concerning for UTI. Blood cultures 1/10/23 show NGTD.      # Intermittent nausea  # Constipation  Good relief with stool softeners. No fecal or urinary incontinence. No dysphagia. 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 Treatment started in November 2022 with last treatmeent in August 17th 2023, prematurely terminated due to increased neurologic issues as noted. She had two remaining doses of Nivolumab.  - Follows with Oncology Kailey Davis     # Rheumatoid arthritis  Previously on methotrexate and Etanercept stopped due to immunomodulatory adjuvant therapy.  - Held prednisone 5 mg during steroid burst (see above)     # Hypothyroidism  - Levothyroxine 50 mcg     # Anxiety  # Depression   Patient experiencing difficulty sleeping due to anxiety while admitted. PTA Citalopram may be contributing to hyperreflexia.   - Citalopram PTA 40 mg daily  - Hydroxyzine 25mg prn    # Hx of alcohol abuse  - No active interventions     DVT Prophylaxis: Lovenox 40mg Q24H  Code Status: Full Code  FEN: Regular diet    TIMMY GARCIA    I have seen and examined the patient with TIMMY GARCIA, and agree with the history, physical exams, assessment and plan.     Patient was seen and discussed with Dr. Gilman.    Sam Narvaez MD  PGY-1 Neurology

## 2024-01-12 ENCOUNTER — APPOINTMENT (OUTPATIENT)
Dept: OCCUPATIONAL THERAPY | Facility: CLINIC | Age: 64
End: 2024-01-12
Payer: COMMERCIAL

## 2024-01-12 LAB
ANION GAP SERPL CALCULATED.3IONS-SCNC: 14 MMOL/L (ref 7–15)
BASOPHILS # BLD AUTO: 0 10E3/UL (ref 0–0.2)
BASOPHILS NFR BLD AUTO: 0 %
BUN SERPL-MCNC: 16.6 MG/DL (ref 8–23)
CALCIUM SERPL-MCNC: 8.7 MG/DL (ref 8.8–10.2)
CHLORIDE SERPL-SCNC: 105 MMOL/L (ref 98–107)
CREAT SERPL-MCNC: 0.97 MG/DL (ref 0.51–0.95)
DEPRECATED HCO3 PLAS-SCNC: 15 MMOL/L (ref 22–29)
EGFRCR SERPLBLD CKD-EPI 2021: 66 ML/MIN/1.73M2
EOSINOPHIL # BLD AUTO: 0 10E3/UL (ref 0–0.7)
EOSINOPHIL NFR BLD AUTO: 0 %
ERYTHROCYTE [DISTWIDTH] IN BLOOD BY AUTOMATED COUNT: 12.8 % (ref 10–15)
GAD65 AB SER IA-ACNC: <5 IU/ML
GLUCOSE BLDC GLUCOMTR-MCNC: 230 MG/DL (ref 70–99)
GLUCOSE SERPL-MCNC: 166 MG/DL (ref 70–99)
HCT VFR BLD AUTO: 33.2 % (ref 35–47)
HGB BLD-MCNC: 11.1 G/DL (ref 11.7–15.7)
IMM GRANULOCYTES # BLD: 0.2 10E3/UL
IMM GRANULOCYTES NFR BLD: 1 %
LYMPHOCYTES # BLD AUTO: 0.7 10E3/UL (ref 0.8–5.3)
LYMPHOCYTES NFR BLD AUTO: 5 %
MCH RBC QN AUTO: 31.3 PG (ref 26.5–33)
MCHC RBC AUTO-ENTMCNC: 33.4 G/DL (ref 31.5–36.5)
MCV RBC AUTO: 94 FL (ref 78–100)
MONOCYTES # BLD AUTO: 0.3 10E3/UL (ref 0–1.3)
MONOCYTES NFR BLD AUTO: 2 %
NEUTROPHILS # BLD AUTO: 13.2 10E3/UL (ref 1.6–8.3)
NEUTROPHILS NFR BLD AUTO: 92 %
NRBC # BLD AUTO: 0 10E3/UL
NRBC BLD AUTO-RTO: 0 /100
PLATELET # BLD AUTO: 307 10E3/UL (ref 150–450)
POTASSIUM SERPL-SCNC: 3.5 MMOL/L (ref 3.4–5.3)
RBC # BLD AUTO: 3.55 10E6/UL (ref 3.8–5.2)
SODIUM SERPL-SCNC: 134 MMOL/L (ref 135–145)
WBC # BLD AUTO: 14.4 10E3/UL (ref 4–11)

## 2024-01-12 PROCEDURE — 250N000013 HC RX MED GY IP 250 OP 250 PS 637

## 2024-01-12 PROCEDURE — 250N000013 HC RX MED GY IP 250 OP 250 PS 637: Performed by: PSYCHIATRY & NEUROLOGY

## 2024-01-12 PROCEDURE — 99232 SBSQ HOSP IP/OBS MODERATE 35: CPT | Mod: GC | Performed by: PSYCHIATRY & NEUROLOGY

## 2024-01-12 PROCEDURE — 85025 COMPLETE CBC W/AUTO DIFF WBC: CPT

## 2024-01-12 PROCEDURE — 120N000002 HC R&B MED SURG/OB UMMC

## 2024-01-12 PROCEDURE — 97535 SELF CARE MNGMENT TRAINING: CPT | Mod: GO

## 2024-01-12 PROCEDURE — 36415 COLL VENOUS BLD VENIPUNCTURE: CPT

## 2024-01-12 PROCEDURE — 258N000003 HC RX IP 258 OP 636

## 2024-01-12 PROCEDURE — 250N000011 HC RX IP 250 OP 636

## 2024-01-12 PROCEDURE — 80048 BASIC METABOLIC PNL TOTAL CA: CPT

## 2024-01-12 PROCEDURE — 97165 OT EVAL LOW COMPLEX 30 MIN: CPT | Mod: GO

## 2024-01-12 RX ORDER — NICOTINE POLACRILEX 4 MG
15-30 LOZENGE BUCCAL
Status: DISCONTINUED | OUTPATIENT
Start: 2024-01-12 | End: 2024-01-18 | Stop reason: HOSPADM

## 2024-01-12 RX ORDER — CLONAZEPAM 0.5 MG/1
1 TABLET ORAL 3 TIMES DAILY
Status: DISCONTINUED | OUTPATIENT
Start: 2024-01-12 | End: 2024-01-15

## 2024-01-12 RX ORDER — DEXTROSE MONOHYDRATE 25 G/50ML
25-50 INJECTION, SOLUTION INTRAVENOUS
Status: DISCONTINUED | OUTPATIENT
Start: 2024-01-12 | End: 2024-01-18 | Stop reason: HOSPADM

## 2024-01-12 RX ADMIN — SENNOSIDES AND DOCUSATE SODIUM 2 TABLET: 8.6; 5 TABLET ORAL at 14:15

## 2024-01-12 RX ADMIN — CLONAZEPAM 1 MG: 0.5 TABLET ORAL at 20:06

## 2024-01-12 RX ADMIN — ACETAMINOPHEN 650 MG: 325 TABLET, FILM COATED ORAL at 18:08

## 2024-01-12 RX ADMIN — SODIUM CHLORIDE 1000 MG: 9 INJECTION, SOLUTION INTRAVENOUS at 09:16

## 2024-01-12 RX ADMIN — CITALOPRAM 40 MG: 40 TABLET ORAL at 09:15

## 2024-01-12 RX ADMIN — CLONAZEPAM 1 MG: 0.5 TABLET ORAL at 14:15

## 2024-01-12 RX ADMIN — GABAPENTIN 300 MG: 300 CAPSULE ORAL at 21:30

## 2024-01-12 RX ADMIN — POLYETHYLENE GLYCOL 3350 17 G: 17 POWDER, FOR SOLUTION ORAL at 14:16

## 2024-01-12 RX ADMIN — PANTOPRAZOLE SODIUM 40 MG: 40 TABLET, DELAYED RELEASE ORAL at 09:15

## 2024-01-12 RX ADMIN — ENOXAPARIN SODIUM 40 MG: 40 INJECTION SUBCUTANEOUS at 14:16

## 2024-01-12 RX ADMIN — LEVOTHYROXINE SODIUM 50 MCG: 0.05 TABLET ORAL at 09:15

## 2024-01-12 RX ADMIN — HUMAN IMMUNOGLOBULIN G 20 G: 20 LIQUID INTRAVENOUS at 18:43

## 2024-01-12 RX ADMIN — DIPHENHYDRAMINE HYDROCHLORIDE 50 MG: 50 CAPSULE ORAL at 18:08

## 2024-01-12 ASSESSMENT — ACTIVITIES OF DAILY LIVING (ADL)
FALL_HISTORY_WITHIN_LAST_SIX_MONTHS: NO
TOILETING_ISSUES: YES
ADLS_ACUITY_SCORE: 38
HEARING_DIFFICULTY_OR_DEAF: NO
DOING_ERRANDS_INDEPENDENTLY_DIFFICULTY: YES
VISION_MANAGEMENT: GLASSES
EQUIPMENT_CURRENTLY_USED_AT_HOME: WALKER, ROLLING
ADLS_ACUITY_SCORE: 35
WALKING_OR_CLIMBING_STAIRS_DIFFICULTY: YES
ADLS_ACUITY_SCORE: 42
ADLS_ACUITY_SCORE: 38
DEPENDENT_IADLS:: CLEANING;COOKING;SHOPPING;LAUNDRY;MEAL PREPARATION;MEDICATION MANAGEMENT;TRANSPORTATION
CHANGE_IN_FUNCTIONAL_STATUS_SINCE_ONSET_OF_CURRENT_ILLNESS/INJURY: YES
ADLS_ACUITY_SCORE: 38
WEAR_GLASSES_OR_BLIND: YES
CONCENTRATING,_REMEMBERING_OR_MAKING_DECISIONS_DIFFICULTY: NO
ADLS_ACUITY_SCORE: 35
TOILETING_ASSISTANCE: TOILETING DIFFICULTY, REQUIRES EQUIPMENT;TOILETING DIFFICULTY, ASSISTANCE 1 PERSON
DIFFICULTY_EATING/SWALLOWING: NO
ADLS_ACUITY_SCORE: 35
DRESSING/BATHING_DIFFICULTY: NO
ADLS_ACUITY_SCORE: 35
ADLS_ACUITY_SCORE: 42
DIFFICULTY_COMMUNICATING: NO
ADLS_ACUITY_SCORE: 35

## 2024-01-12 NOTE — PROGRESS NOTES
"[facility]  General Neurology - Progress Note    Patient Name:  Shayy Cortez  Date of Service:  January 12, 2024    Subjective:    Patient is feeling much better today. She feels less jerky/stiff. Straight catheter used yesterday for urinary rentention. Saw PT yesterday who recommended ARU - would prefer to do PT at home. Tolerated first dose of steroids well yesterday. Denies headache, nausea/vomiting, or rash.     Objective:    Vitals: /80 (BP Location: Left arm)   Pulse 102   Temp 98.5  F (36.9  C) (Oral)   Resp 18   Ht 1.575 m (5' 2.01\")   SpO2 97%   BMI 19.02 kg/m    General: Lying in bed, NAD  Head: Atraumatic, normocephalic   Cardiac: no lower extremity edema  Neuro:  Mental status: Awake, alert, attentive, oriented to self, time, place, and circumstance. Language is fluent and coherent with intact comprehension of complex commands, naming and repetition.  Cranial nerves: VFF, PERRL, conjugate gaze, EOMI without gaze palsy, occasional square wave jerks, facial sensation intact, face symmetric, shoulder shrug strong, tongue/uvula midline, no dysarthria. Jaw jerk reflex present.  Motor: Normal bulk and increased tone in the lower extremities. High-frequency postural and intention tremor, improved from day prior - no longer with obvious wing flapping tremors, with decreased whole body jerks as compared to presentation. 5/5 strength bilaterally in deltoids, biceps, triceps, hand , hip flexors, hip extensors, knee flexion, knee extension, plantarflexion, dorsiflexion.   Reflexes: Hyperreflexic (3+) in biceps, brachioradialis, triceps, patellae and achilles. Negative Antony and toes down-going. Sustained ankle clonus present bilaterally (8-10).  Sensory: Intact to light touch, pin, vibration, and proprioception.  Coordination: FNF and HS without ataxia or dysmetria.  Gait: Deferred due to weakness and unsteadiness.    Pertinent Investigations:    I have " Patient not due back for follow up until April 2022 but is requesting an appointment to make sure things are going ok. Patient states he is not experiencing any issues at this time and would like an appointment in October for a check up. Please advise when to schedule patient, or call him to discuss his follow up date.    personally reviewed most recent and pertinent labs, tests, and radiological images.     Assessment & Plan:  Shayy Cortez is a 63 year old female with past medical history of cutaneous melanoma of the abdomen s/p one year treatment of nivolumab with development of progressive tremor, transient vertigo, abnormal eye movements and gait instability despite cessation of Nivolumab that started in August 2023. She had no difficulties with her gait prior to August. Unclear etiology at this time, however, with highest suspicion for Stiff Person Syndrome. Parkinsons plus syndromes have been considered given the patient's rigidity, bradykinesia and abnormal eye movements with square wave jerks; however, feel this is less likely given the patient's quickly progressing symptoms with reassuring radiologic findings (no midbrain atrophy or hot cross buns sign). Furthermore, she has had two LP's now (including one this admission) that are suggestive of an inflammatory process with elevated cell counts with lymphocytic predominance, elevated protein, and presence of a couple oligoclonal bands. Given the patient's history of rheumatoid arthritis and rampant family history of autoimmune disease, suspect that an autoimmune process is most likely. Awaiting ESTEPHANIA 65, DPPX, and glycine antibody results. Trialing high dose steroids, IVIG, and benzo's given the severity of her symptoms.     Observing improvement in tone and tremor after the first few doses of IVIG and steroids.     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 Delcid in [facility]. She initially endorsed bilateral temporal and occipital headaches with severity of 4/10 being described as dull pain. Additionally endorsed mild confusion with start or stop patterns in the mid sentence. She also has neck pain and dizziness since August. Describes vertical diplopia and worse with far away objects. High  frequency tremor in the arms and legs with whole body jerks. Her legs feel stiff and weak. She has notable square wave jerks with movement of the eyes, bradykinesia, and hyperreflexia. MRI brain and CTA head and neck unrevealing. In December 22 2023, her Neurologist recommended starting a 5-day course of high-dose solumedrol; however this was not started due to timing issues.  - Started IVIG 20g on 1/10/23 - plan for 5 days (last day 1/14)  - Started solumedrol 1000mg on 1/11/23 - plan for 5 days (last day 1/15)  - If pt amenable to ARU, complete 5 days of steroids; if plans to go home can do 4 days of steroids.   - Upon discharge, send home on slow steroid taper (decrease by 10 mg every week) and IVIG maintenance outpatient.  - Start clonazepam 1 mg TID   - PTA tylenol with good relief of headache  - Follow-up ESTEPHANIA 65, DPPX and glycine antibodies ([place] movement disorders panel both CSF and serum) - pending  - PT/OT/SLP - recommended ARU     Negative / Normal labs  - Lyme Ig  - Treponema  - CSF VDRL, ACE, meningitis and encephalitis PCR panel,  - TRAN  - ANCA  - Paraneoplastic antibodies              - PCAs              - Neuronoal K son              - CRMP              - ANNAs              - Glial nuclear              - Amphiphysin  - MuSK antibodies  - Myasthenia Ab  - Lambert Eaton Ab  - SSB / SSA  - ACE  - Myeloma / MGUS  - GQ1B Ab  - Serum Oligoclonal bands  - Copper   - Homocysteine  - Methylmalonic acid  - Flow cytometry without lymphoma or blast population      Abnormal / Elevated labs  - Cadmium / creatinine 1.6  - CSF 12/14/23 clear and colorless, nucleated cell count 33, Protein 78, lymphocytosis 84, glucose 53, Kappa light chain 0.15, Oligoclonal bands 2  - CSF 1/11/23 clear and colorless, nucleated cell count 32, protein 85.2, lymphocytosis 91, glucose 54    # Moderate Cervical Spinal Canal Stenosis of C3 - C7  Incidentally discovered when MRI C Spine performed given patient's significant hyperreflexia  with 4+ reflexes in patellars and sustained clonus. Possibly contributing to patient's hyperreflexia but cannot fully explain her presentation, particularly her abnormal eye movements. Furthermore, she has a positive jaw jerk reflex, suggestive of a neurological process affecting her brain. Notably, MRI was without cord signal change; thus do not suspect true myelopathy from her cervical stenosis.       #Urinary Retention  Likely secondary to hydroxyzine that was given prn for anxiety  - Hold hydroxyzine after discussing this with patient      # Leukocytosis  In setting of high dose steroids. No infectious symptoms   - CBC daily    # Anxiety  # Depression   Patient experiencing difficulty sleeping due to anxiety while admitted. PTA Citalopram may be contributing to hyperreflexia.   - Citalopram PTA 40 mg daily  - Hold Hydroxyzine 25mg prn given concerns for urinary retention  - Plan to start clonazepam per above      # Constipation  Good relief with stool softeners.   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 Treatment started in November 2022 with last treatment in August 17th 2023, prematurely terminated due to increased neurologic issues as noted. She had two remaining doses of Nivolumab.  - Follows with Oncology Kailey Davis     # Rheumatoid arthritis  Previously on methotrexate and Etanercept stopped due to immunomodulatory adjuvant therapy.  - Held PTA prednisone 5 mg during steroid burst (see above)     # Hypothyroidism  - PTA Levothyroxine 50 mcg    # Hx of alcohol abuse  - No active interventions    Resolved Conditions:   # Lactic Acidosis, resolved  Lactic acid on admission 4.1 -> 1.5 s/p 2L IVF with normalization. Blood cultures 1/10/23 show NGTD.      DVT  Prophylaxis: Lovenox 40mg Q24H  Code Status: Full Code  FEN: Regular diet    PAL GOLDMAN, MS3    I have seen and examined the patient with PAL GOLDMAN MS3, and agree with the history, physical exams, assessment and plan.     Patient was seen and discussed with Dr. Gilman.    Resident/Fellow Attestation   I, Liz Ro MD, was present with the medical/YVONNE student who participated in the service and in the documentation of the note.  I have verified the history and personally performed the physical exam and medical decision making.  I agree with the assessment and plan of care as documented in the note.      Liz Ro MD  PGY-3 Neurology Resident

## 2024-01-12 NOTE — PLAN OF CARE
Goal Outcome Evaluation:      Plan of Care Reviewed With: patient, spouse    Overall Patient Progress: improvingOverall Patient Progress: improving    Vitals: Stable on RA  Neuro: A&Ox4  Mobility:  Standby assist.  Pain/Nausea: Denies pain. Denies nausea.   Diet & GI: Regular diet; no BM this shift  Labs: No significant changes this shift.  LDAs: Central line  Skin/incisions: skin check performed  Respiratory: No significant changes this shift.  Cardiac: Tachy with HTN  : Voids spontaneously. Using bedpan.    NEW CHANGES: Pt arrived to unit tonight after two nights in ED accompanied by spouse. Permission for spouse to overnight granted by Charge Nurse due to significant distance. Pt has received a Lumbar puncture and is being treated with Immune Globulin; administered prior to admission to the unit. No adverse effects noted this shift.     Continue to implement Care Plan.    Jarod Huitron RN

## 2024-01-12 NOTE — PLAN OF CARE
Goal Outcome Evaluation:      Plan of Care Reviewed With: patient    Overall Patient Progress: improvingOverall Patient Progress: improving    Outcome Evaluation: Anticipate patient will discharge to [facility] acute rehab when medically stable.

## 2024-01-12 NOTE — CONSULTS
Care Management Initial Consult    General Information  Assessment completed with: Patient, Care Team Member, -chart review,    Type of CM/SW Visit: Initial Assessment    Primary Care Provider verified and updated as needed: Yes -- patient sees Dr. Valente at the [facility] in [place], [place].  Readmission within the last 30 days: no previous admission in last 30 days      Reason for Consult: discharge planning  Advance Care Planning: Advance Care Planning Reviewed: no concerns identified          Communication Assessment  Patient's communication style: spoken language (English or Bilingual)    Hearing Difficulty or Deaf: no   Wear Glasses or Blind: yes    Cognitive  Cognitive/Neuro/Behavioral: WDL  Level of Consciousness: alert  Arousal Level: opens eyes spontaneously  Orientation: oriented x 4  Mood/Behavior: cooperative  Best Language: 0 - No aphasia  Speech: clear, spontaneous, logical    Living Environment:   People in home: significant other     Current living Arrangements: house      Able to return to prior arrangements: no       Family/Social Support:  Care provided by: spouse/significant other, self  Provides care for: no one, unable/limited ability to care for self  Marital Status: Lives with Significant Other  Partner (Kyle), Children (two daughters (one lives in [facility]), two sons that live in [place])            Description of Support System: Supportive, Involved    Support Assessment: Adequate family and caregiver support, Adequate social supports    Current Resources:   Patient receiving home care services: No     Community Resources: Other (see comment) (SNAP)  Equipment currently used at home: walker, rolling  Supplies currently used at home: None    Employment/Financial:  Employment Status: disabled-- patient recently applied for social security disability.         Financial Concerns: none -- currently, Shayy and her SO Kyle are living off of Kyle' salary. Shayy said this has been  difficult but will hopefully improve when she receives SSDI.  Referral to Financial Worker: No       Does the patient's insurance plan have a 3 day qualifying hospital stay waiver?  No    Lifestyle & Psychosocial Needs:  Social Determinants of Health     Food Insecurity: Not on file   Depression: Not on file   Housing Stability: Not on file   Tobacco Use: Not on file   Financial Resource Strain: Not on file   Alcohol Use: Not on file   Transportation Needs: Not on file   Physical Activity: Not on file   Interpersonal Safety: Not on file   Stress: Not on file   Social Connections: Not on file       Functional Status:  Prior to admission patient needed assistance:   Dependent ADLs:: Toileting, Transfers, Bathing, Dressing, Positioning  Dependent IADLs:: Cleaning, Cooking, Shopping, Laundry, Meal Preparation, Medication Management, Transportation  Assesssment of Functional Status: Not at  functional baseline    Mental Health Status:  Mental Health Status: Past Concern -- pt endorses a history of anxiety an depression. She is currently on medication to help cope.    Chemical Dependency Status:  Chemical Dependency Status: Past Concern -- pt said she has a history of drug and alcohol abuse. She stated she has been sober for 18 years and does not have any current concerns about her chemical health.            Values/Beliefs:  Spiritual, Cultural Beliefs, [employer] Practices, Values that affect care: no               Additional Information:    Shayy Cortez is a 63 year old female with past medical history of prior alcohol abuse, Anxiety on Citalopram, hypothyroidism on levothyroxine, rheumatoid arthritis on Prednisone, cutaneous melanoma of the upper abdomen s/p resection in July 2022 with 2 positive lymph nodes of the left axilla s/p resection and adjuvant therapy with Opdivo (November 2022 - August 2023) who is presenting with various neurologic defects including double vision, vertigo and intermittent confusion.      SW met with patient at bedside to complete assessment. Introduced self and role. Please see details above. SW discussed acute rehab recommendations. Patient is agreeable to go to [facility] if needed when she is ready for discharge. All concerns addressed in the encounter. SW will continue to follow for discharge planning.      JUAN Dias W   [address]B    Phone: [phone]  Pager: [phone]  Melissa

## 2024-01-12 NOTE — PLAN OF CARE
"BP (!) 154/97 (BP Location: Left arm)   Pulse 111   Temp 97.9  F (36.6  C) (Oral)   Resp 18   Ht 1.575 m (5' 2.01\")   SpO2 98%   BMI 19.02 kg/m        A&O x 4. Tremors on bilateral upper and lower extremities. Reported abdominal pressure, bladder scan 659 mL. Unable to void. Straight cath with 600 mL output. No BM this shift. Assist of 1 pivot transfer from bed to commode. Port A Cath TKO. HTN and tachycardic. Denies pain.         Goal Outcome Evaluation: ongoing.       Plan of Care Reviewed With: patient    Overall Patient Progress: no changeOverall Patient Progress: no change    "

## 2024-01-12 NOTE — PROGRESS NOTES
01/12/24 0947   Appointment Info   Signing Clinician's Name / Credentials (OT) Stephy Shahrigo, OTR/L   Living Environment   People in Home significant other   Current Living Arrangements mobile home   Home Accessibility stairs to enter home   Number of Stairs, Main Entrance 4   Stair Railings, Main Entrance railings safe and in good condition   Transportation Anticipated family or friend will provide   Living Environment Comments Pt lives in mobile home with partner, Jorge. Tub shower. Does not have support while Jorge is at work.   Self-Care   Usual Activity Tolerance moderate   Current Activity Tolerance poor   Regular Exercise No   Equipment Currently Used at Home walker, rolling   Fall history within last six months no   Activity/Exercise/Self-Care Comment Pt reports IND/Mod I at baseline but has been mostly bedridden over the last 2 weeks. Partner has been DEP showering pt and assisting with ADLs.   Instrumental Activities of Daily Living (IADL)   IADL Comments Pt reports has been unable to complete IADLs recenly.   General Information   Onset of Illness/Injury or Date of Surgery 01/09/24   Referring Physician Sam Narvaez MD   Patient/Family Therapy Goal Statement (OT) to get stronger   Additional Occupational Profile Info/Pertinent History of Current Problem Per chart: 63 year old female with past medical history of melanoma s/p one year treatment of nivolumab with development of progressive tremor, vertigo, abnormal eye movements and gait instability despite cessation of Nivolumab in August. Unclear etiology at this time, however, with suspicion for Stiff Person Syndrome. Parkinsons plus has been considered however it is progressing quickly and patient does not have a lot of PD signs.   Existing Precautions/Restrictions fall   Left Upper Extremity (Weight-bearing Status) full weight-bearing (FWB)   Right Upper Extremity (Weight-bearing Status) full weight-bearing (FWB)   Left Lower Extremity  (Weight-bearing Status) full weight-bearing (FWB)   Right Lower Extremity (Weight-bearing Status) full weight-bearing (FWB)   General Observations and Info Activity Ambulate  (Patient is low risk and no VTE prophylaxis is needed - Ambulate)   Cognitive Status Examination   Orientation Status orientation to person, place and time   Cognitive Status Comments Pt appears grossly cognitively intact throughout obs/interview.   Visual Perception   Visual Impairment/Limitations corrective lenses for reading   Impact of Vision Impairment on Function (Vision) Reports tremors in eyes have been impacting vision.   Sensory   Sensory Quick Adds sensation intact   Pain Assessment   Patient Currently in Pain No   Posture   Posture forward head position;protracted shoulders   Range of Motion Comprehensive   General Range of Motion bilateral upper extremity ROM WFL   Strength Comprehensive (MMT)   Comment, General Manual Muscle Testing (MMT) Assessment Not formally assessed, demonstrates generalized deconditioning and weakness.   Coordination   Functional Limitations Decreased speed;Impaired ability to perform bilateral tasks;Object transport impaired;Reach to targets impaired;Fine motor ADL performance impaired   Coordination Comments FMC impaired by tremors.   Bed Mobility   Comment (Bed Mobility) IND   Transfers   Transfer Comments CGA with FWw   Balance   Balance Comments dynamic balance impacted by full body tremors   Activities of Daily Living   BADL Assessment/Intervention bathing;upper body dressing;lower body dressing;feeding;grooming;toileting;clothing fastener management   Bathing Assessment/Intervention   Sunflower Level (Bathing) maximum assist (25% patient effort)   Comment, (Bathing) Per clinical reasoning   Upper Body Dressing Assessment/Training   Comment, (Upper Body Dressing) Per clinical reasoning   Sunflower Level (Upper Body Dressing) minimum assist (75% patient effort)   Lower Body Dressing  Assessment/Training   Comment, (Lower Body Dressing) Per clinical reasoning   Rolette Level (Lower Body Dressing) moderate assist (50% patient effort)   Clothes Fastener Management   Rolette Level (Clothes Fastener Management) clothes fastener management;minimum assist (75% patient effort)   Comment, (Clothes Fastener Management) Per clinical reasoning   Grooming Assessment/Training   Rolette Level (Grooming) contact guard assist   Comment, (Grooming) standing at sink with FWW   Eating/Self Feeding   Rolette Level (Feeding) minimum assist (75% patient effort)   Comment, (Feeding) Per clinical reasoning   Toileting   Comment, (Toileting) Per clinical reasoning   Rolette Level (Toileting) moderate assist (50% patient effort)   Clinical Impression   Criteria for Skilled Therapeutic Interventions Met (OT) Yes, treatment indicated   OT Diagnosis Decreased safety/engagement in ADLs/IADLs   OT Problem List-Impairments impacting ADL problems related to;activity tolerance impaired;balance;coordination;mobility;motor control;strength;postural control;vision   Assessment of Occupational Performance 5 or more Performance Deficits   Identified Performance Deficits dressing, bathing, toileting, functional mobility, home mgmt, meal prep   Planned Therapy Interventions (OT) ADL retraining;IADL retraining;balance training;strengthening;ROM;neuromuscular re-education;transfer training;progressive activity/exercise;home program guidelines;risk factor education;visual perception   Clinical Decision Making Complexity (OT) detailed assessment/moderate complexity   Risk & Benefits of therapy have been explained evaluation/treatment results reviewed;care plan/treatment goals reviewed;risks/benefits reviewed;current/potential barriers reviewed;participants voiced agreement with care plan;participants included;patient;spouse/significant other   Clinical Impression Comments Pt will benefit from continued skilled OT  "services to progress IND and safety with ADLs/IADLs.   OT Total Evaluation Time   OT Eval, Low Complexity Minutes (99465) 11   OT Goals   Therapy Frequency (OT) 6 times/week   OT Predicted Duration/Target Date for Goal Attainment 01/26/24   OT Goals Hygiene/Grooming;Lower Body Dressing;Upper Body Dressing;Upper Body Bathing;Lower Body Bathing;Toilet Transfer/Toileting   OT: Hygiene/Grooming supervision/stand-by assist   OT: Upper Body Dressing Supervision/stand-by assist   OT: Lower Body Dressing Supervision/stand-by assist   OT: Upper Body Bathing Supervision/stand-by assist   OT: Lower Body Bathing Supervision/stand-by assist   OT: Toilet Transfer/Toileting Supervision/stand-by assist;cleaning and garment management;using adaptive equipment   Interventions   Interventions Quick Adds Self-Care/Home Management   Self-Care/Home Management   Self-Care/Home Mgmt/ADL, Compensatory, Meal Prep Minutes (50792) 24   Symptoms Noted During/After Treatment (Meal Preparation/Planning Training) fatigue;dizziness   Treatment Detail/Skilled Intervention Pt greeted in supine upon arrival, partner \"Jorge\" present throughout. Facilitated ADLs standing at sink to progress engagement in ADLs/IADLs. Pt reports constant feeling of lightheadedness at rest. Edu on slow transitions for positional changes. Supine>sit EOB IND, noted to have increased tremors. STS with CGA and FWW, completes short distance ambulation to sink. Tolerates standing at sink to complete brushing teeth and washing face. Pt completes with significant effort due to increasing full body tremors and CGA. Pt reports increasing lightheadedness. Returned to sitting EOB, HR up to 120s with activity. Returned to supine, with HR quickly returning to . BP stable. Mild improvement in symptoms with pt reporting close to baseline. Pt with questions re: showering, edu on safety precautions, recommended use of shower chair at discharge. Educated pt on getting up to commode to " progress functional strength vs continuing to use bed pan pending symptoms. Pt left end of session in bed with call light in reach and all current needs met.   OT Discharge Planning   OT Plan ADLs standing at sink with chair behind (consider use of weighted handles vs wrist weights to assist with tremors), progress functional mobility   OT Discharge Recommendation (DC Rec) [facility]-Motivated patient will benefit from intensive, interdisciplinary therapy.  Anticipate will be able to tolerate 3 hours of therapy per day   OT Rationale for DC Rec Pt below baseline, requires assist for all ADLs and limited functional mobility. Rec d/c to ARU to progress IND and safety with ADLs/IADLs.   OT Brief overview of current status Ax1 with FWW for short distances   Total Session Time   Timed Code Treatment Minutes 24   Total Session Time (sum of timed and untimed services) 35

## 2024-01-12 NOTE — PROGRESS NOTES
Admitted/transferred from:   2 RN full   skin assessment completed by Jarod Huitron RN and Alysia GARCIA RN.  Skin assessment finding: skin intact, no problems   Interventions/actions: other no immediate actions      Bedside Emergency Equipment Present:  Suction Regulator: Yes  Suction Canister: Yes  Tubing between Regulator and Canister: Yes  O2 Regulator with Tree: Yes  Ambu Bag: Yes    Jarod Huitron RN

## 2024-01-12 NOTE — PROGRESS NOTES
"CLINICAL NUTRITION SERVICES - ASSESSMENT NOTE     Nutrition Prescription    Malnutrition Status:    Severe malnutrition in the context of chronic illness    Recommendations already ordered by Registered Dietitian (RD):  Chocolate magic cup twice daily     Future/Additional Recommendations:  -- monitor oral intake of meals and supplements  -- monitor weight trends      REASON FOR ASSESSMENT  Shayy Cortez is a/an 63 year old female assessed by the dietitian for Admission Nutrition Risk Screen for positive    NUTRITION HISTORY  Shayy reports during chemotherapy her weight increased to 127 lbs and then in the last several months she starting losing weight rapidly due to nausea. Her usual body weight is ~ 110 pounds.   She has tried instant carnation breakfast and ensure, she dislikes both supplements.   She reports her appetite has improved recently.     CURRENT NUTRITION ORDERS  Diet: Regular  Intake/Tolerance: good per nursing documentation.     LABS  Labs reviewed  (1/12/24): Na 134 mmol/L (L)    MEDICATIONS  Medications reviewed    ANTHROPOMETRICS  Height: 157.5 cm (5' 2.008\"[per Care Everywhere 8/2023[)  Most Recent Weight: 47.2 kg (1/9/24)    IBW: 50 kg  BMI: Normal BMI  Weight History:   Wt Readings from Last 10 Encounters:   01/09/24 47.2 kg (104 lb)   Weight 56.6 kg (7/24/23)  11% weight loss in 6 months   Dosing Weight: 47 kg (most current weight)    ASSESSED NUTRITION NEEDS  Estimated Energy Needs: 8871-9529 kcals/day (30 - 35 kcals/kg )  Justification: Repletion  Estimated Protein Needs: 56-71 grams protein/day (1.2 - 1.5 grams of pro/kg)  Justification: Repletion  Estimated Fluid Needs: (1 mL/kcal)   Justification: Maintenance    PHYSICAL FINDINGS  See malnutrition section below.    MALNUTRITION  % Intake: </=75% for >/= 1 month (severe)  % Weight Loss: > 10% in 6 months (severe)  Subcutaneous Fat Loss: Facial region:  mild  Muscle Loss: Temporal:  mild  Fluid Accumulation/Edema: None noted  Malnutrition " Diagnosis: Severe malnutrition in the context of chronic illness    NUTRITION DIAGNOSIS  Unintended weight loss related to nausea, decreased appetite as evidenced by > 10% weight loss in 6 months       INTERVENTIONS  Implementation  Nutrition Education: Provided education on RD role in nutrition POC, nutritional supplements   Medical food supplement therapy     Goals  Patient to consume % of nutritionally adequate meal trays TID, or the equivalent with supplements/snacks.     Monitoring/Evaluation  Progress toward goals will be monitored and evaluated per protocol.  Maggy Reid MS/RD/LD/CNSC  Available on [employer]   7A/7B (beds 219-229) RD pager: [phone]  Weekend/Holiday RD pager: [phone]

## 2024-01-13 ENCOUNTER — APPOINTMENT (OUTPATIENT)
Dept: PHYSICAL THERAPY | Facility: CLINIC | Age: 64
End: 2024-01-13
Payer: COMMERCIAL

## 2024-01-13 LAB
ALBUMIN UR-MCNC: NEGATIVE MG/DL
ANION GAP SERPL CALCULATED.3IONS-SCNC: 12 MMOL/L (ref 7–15)
APPEARANCE UR: CLEAR
BASOPHILS # BLD AUTO: 0 10E3/UL (ref 0–0.2)
BASOPHILS NFR BLD AUTO: 0 %
BILIRUB UR QL STRIP: NEGATIVE
BUN SERPL-MCNC: 20.8 MG/DL (ref 8–23)
CALCIUM SERPL-MCNC: 8.9 MG/DL (ref 8.8–10.2)
CHLORIDE SERPL-SCNC: 108 MMOL/L (ref 98–107)
COLOR UR AUTO: ABNORMAL
CREAT SERPL-MCNC: 0.95 MG/DL (ref 0.51–0.95)
CRP SERPL-MCNC: <3 MG/L
DEPRECATED HCO3 PLAS-SCNC: 18 MMOL/L (ref 22–29)
EGFRCR SERPLBLD CKD-EPI 2021: 68 ML/MIN/1.73M2
EOSINOPHIL # BLD AUTO: 0 10E3/UL (ref 0–0.7)
EOSINOPHIL NFR BLD AUTO: 0 %
ERYTHROCYTE [DISTWIDTH] IN BLOOD BY AUTOMATED COUNT: 13.1 % (ref 10–15)
GLUCOSE BLDC GLUCOMTR-MCNC: 141 MG/DL (ref 70–99)
GLUCOSE BLDC GLUCOMTR-MCNC: 151 MG/DL (ref 70–99)
GLUCOSE BLDC GLUCOMTR-MCNC: 155 MG/DL (ref 70–99)
GLUCOSE BLDC GLUCOMTR-MCNC: 173 MG/DL (ref 70–99)
GLUCOSE BLDC GLUCOMTR-MCNC: 243 MG/DL (ref 70–99)
GLUCOSE SERPL-MCNC: 140 MG/DL (ref 70–99)
GLUCOSE UR STRIP-MCNC: 50 MG/DL
HCT VFR BLD AUTO: 30.3 % (ref 35–47)
HGB BLD-MCNC: 10.1 G/DL (ref 11.7–15.7)
HGB UR QL STRIP: NEGATIVE
HYALINE CASTS: 1 /LPF
IMM GRANULOCYTES # BLD: 0.2 10E3/UL
IMM GRANULOCYTES NFR BLD: 1 %
KETONES UR STRIP-MCNC: NEGATIVE MG/DL
LEUKOCYTE ESTERASE UR QL STRIP: NEGATIVE
LYMPHOCYTES # BLD AUTO: 0.9 10E3/UL (ref 0.8–5.3)
LYMPHOCYTES NFR BLD AUTO: 5 %
MCH RBC QN AUTO: 30.3 PG (ref 26.5–33)
MCHC RBC AUTO-ENTMCNC: 33.3 G/DL (ref 31.5–36.5)
MCV RBC AUTO: 91 FL (ref 78–100)
MONOCYTES # BLD AUTO: 0.8 10E3/UL (ref 0–1.3)
MONOCYTES NFR BLD AUTO: 4 %
MUCOUS THREADS #/AREA URNS LPF: PRESENT /LPF
NEUTROPHILS # BLD AUTO: 17.1 10E3/UL (ref 1.6–8.3)
NEUTROPHILS NFR BLD AUTO: 90 %
NITRATE UR QL: NEGATIVE
NRBC # BLD AUTO: 0 10E3/UL
NRBC BLD AUTO-RTO: 0 /100
PH UR STRIP: 7 [PH] (ref 5–7)
PLATELET # BLD AUTO: 317 10E3/UL (ref 150–450)
POTASSIUM SERPL-SCNC: 3.5 MMOL/L (ref 3.4–5.3)
PROCALCITONIN SERPL IA-MCNC: 0.1 NG/ML
RBC # BLD AUTO: 3.33 10E6/UL (ref 3.8–5.2)
RBC URINE: 3 /HPF
SODIUM SERPL-SCNC: 138 MMOL/L (ref 135–145)
SP GR UR STRIP: 1.02 (ref 1–1.03)
SQUAMOUS EPITHELIAL: <1 /HPF
UROBILINOGEN UR STRIP-MCNC: NORMAL MG/DL
WBC # BLD AUTO: 19 10E3/UL (ref 4–11)
WBC URINE: <1 /HPF

## 2024-01-13 PROCEDURE — 85025 COMPLETE CBC W/AUTO DIFF WBC: CPT

## 2024-01-13 PROCEDURE — 99232 SBSQ HOSP IP/OBS MODERATE 35: CPT | Mod: GC | Performed by: PSYCHIATRY & NEUROLOGY

## 2024-01-13 PROCEDURE — 97116 GAIT TRAINING THERAPY: CPT | Mod: GP

## 2024-01-13 PROCEDURE — 258N000003 HC RX IP 258 OP 636

## 2024-01-13 PROCEDURE — 250N000012 HC RX MED GY IP 250 OP 636 PS 637

## 2024-01-13 PROCEDURE — 84145 PROCALCITONIN (PCT): CPT

## 2024-01-13 PROCEDURE — 250N000011 HC RX IP 250 OP 636

## 2024-01-13 PROCEDURE — 36415 COLL VENOUS BLD VENIPUNCTURE: CPT

## 2024-01-13 PROCEDURE — 250N000013 HC RX MED GY IP 250 OP 250 PS 637: Performed by: PSYCHIATRY & NEUROLOGY

## 2024-01-13 PROCEDURE — 86140 C-REACTIVE PROTEIN: CPT

## 2024-01-13 PROCEDURE — 81001 URINALYSIS AUTO W/SCOPE: CPT

## 2024-01-13 PROCEDURE — 120N000002 HC R&B MED SURG/OB UMMC

## 2024-01-13 PROCEDURE — 250N000013 HC RX MED GY IP 250 OP 250 PS 637

## 2024-01-13 PROCEDURE — 80048 BASIC METABOLIC PNL TOTAL CA: CPT

## 2024-01-13 RX ORDER — POLYETHYLENE GLYCOL 3350 17 G/17G
17 POWDER, FOR SOLUTION ORAL 2 TIMES DAILY
Status: DISCONTINUED | OUTPATIENT
Start: 2024-01-13 | End: 2024-01-18 | Stop reason: HOSPADM

## 2024-01-13 RX ORDER — POLYETHYLENE GLYCOL 3350 17 G/17G
17 POWDER, FOR SOLUTION ORAL DAILY PRN
Status: DISCONTINUED | OUTPATIENT
Start: 2024-01-13 | End: 2024-01-18 | Stop reason: HOSPADM

## 2024-01-13 RX ORDER — AMOXICILLIN 250 MG
1 CAPSULE ORAL 2 TIMES DAILY
Status: DISCONTINUED | OUTPATIENT
Start: 2024-01-13 | End: 2024-01-13

## 2024-01-13 RX ORDER — AMOXICILLIN 250 MG
1 CAPSULE ORAL 2 TIMES DAILY PRN
Status: DISCONTINUED | OUTPATIENT
Start: 2024-01-13 | End: 2024-01-18 | Stop reason: HOSPADM

## 2024-01-13 RX ADMIN — CLONAZEPAM 1 MG: 0.5 TABLET ORAL at 20:00

## 2024-01-13 RX ADMIN — LEVOTHYROXINE SODIUM 50 MCG: 0.05 TABLET ORAL at 09:37

## 2024-01-13 RX ADMIN — DIPHENHYDRAMINE HYDROCHLORIDE 50 MG: 50 CAPSULE ORAL at 19:31

## 2024-01-13 RX ADMIN — HUMAN IMMUNOGLOBULIN G 20 G: 20 LIQUID INTRAVENOUS at 20:00

## 2024-01-13 RX ADMIN — GABAPENTIN 300 MG: 300 CAPSULE ORAL at 21:05

## 2024-01-13 RX ADMIN — ENOXAPARIN SODIUM 40 MG: 40 INJECTION SUBCUTANEOUS at 14:33

## 2024-01-13 RX ADMIN — CLONAZEPAM 1 MG: 0.5 TABLET ORAL at 09:37

## 2024-01-13 RX ADMIN — ACETAMINOPHEN 650 MG: 325 TABLET, FILM COATED ORAL at 19:31

## 2024-01-13 RX ADMIN — SODIUM CHLORIDE 1000 MG: 9 INJECTION, SOLUTION INTRAVENOUS at 09:30

## 2024-01-13 RX ADMIN — INSULIN ASPART 1 UNITS: 100 INJECTION, SOLUTION INTRAVENOUS; SUBCUTANEOUS at 17:35

## 2024-01-13 RX ADMIN — PANTOPRAZOLE SODIUM 40 MG: 40 TABLET, DELAYED RELEASE ORAL at 09:37

## 2024-01-13 RX ADMIN — CLONAZEPAM 1 MG: 0.5 TABLET ORAL at 14:33

## 2024-01-13 RX ADMIN — CITALOPRAM 40 MG: 40 TABLET ORAL at 09:37

## 2024-01-13 RX ADMIN — INSULIN ASPART 1 UNITS: 100 INJECTION, SOLUTION INTRAVENOUS; SUBCUTANEOUS at 11:03

## 2024-01-13 RX ADMIN — POLYETHYLENE GLYCOL 3350 17 G: 17 POWDER, FOR SOLUTION ORAL at 20:00

## 2024-01-13 ASSESSMENT — ACTIVITIES OF DAILY LIVING (ADL)
ADLS_ACUITY_SCORE: 35

## 2024-01-13 NOTE — PLAN OF CARE
Goal Outcome Evaluation:      Plan of Care Reviewed With: patient    Overall Patient Progress: no change    A&Ox4, pt reports tremors feel improved today, still has bilateral upper and lower extremity tremors. Unable to void this shift, bladder scanned 553-straight cathed 525 in AM, bladder scan in evening 594-straight cathed 675, UA sent. If unable to void by tomorrow AM, need to notify provider for further instructions per voiding protocol. States LBM over a week ago-provider notified, scheduled miralax & senna ordered-1x BM this shift. Assist x1 w/ GB pivot to commode. Continue POC.

## 2024-01-13 NOTE — PLAN OF CARE
Goal Outcome Evaluation:      Plan of Care Reviewed With: patient    Overall Patient Progress: no change    Shift [id number]    Pt is A&Ox4. Denies pain and nausea. Extremities tremors. On scheduled Klonopin & Gabapentin at bedtime. Tolerating regular diet.  & 151. Up to BSC with assist x2 walker/gait belt. Voided scant amount, post void bladder scan 620 ml, straight cath x1 for 620 ml. No BM this shift. No acute changes this shift,  at bedside. Continue POC.

## 2024-01-13 NOTE — PROGRESS NOTES
"[facility]  General Neurology - Progress Note    Patient Name:  Shayy Cortez  Date of Service:  January 13, 2024    Subjective:    Patient is feeling better today. She feels less jerky/stiff/tremor. Straight catheter used last night for urinary rentention. Tolerated steroids well. Denies headache, nausea/vomiting, or rash. Leukocytosis likely due to steroids use, no fever/ chills, abdominal pain. Does have chronic constipation, has PRN bowel regimen (miralax and senna), scheduled miralax BID.     Objective:    Vitals: BP (!) 154/92 (BP Location: Left arm, Patient Position: Semi-Rodrigues's, Cuff Size: Adult Small)   Pulse 101   Temp 98  F (36.7  C) (Oral)   Resp 16   Ht 1.575 m (5' 2.01\")   SpO2 98%   BMI 19.02 kg/m    General: Lying in bed, NAD  Head: Atraumatic, normocephalic   Cardiac: no lower extremity edema  Neuro:  Mental status: Awake, alert, attentive, oriented to self, time, place, and circumstance. Language is fluent and coherent with intact comprehension of complex commands, naming and repetition.  Cranial nerves: VFF, PERRL, conjugate gaze, EOMI without gaze palsy, occasional square wave jerks, facial sensation intact, face symmetric, shoulder shrug strong, tongue/uvula midline, no dysarthria. Jaw jerk reflex present.  Motor: Normal bulk and increased tone in the lower extremities. High-frequency postural and intention tremor, improved from day prior - no longer with obvious wing flapping tremors, with decreased whole body jerks as compared to presentation. 5/5 strength bilaterally in deltoids, biceps, triceps, hand , hip flexors, hip extensors, knee flexion, knee extension, plantarflexion, dorsiflexion.   Reflexes: Hyperreflexic (3+) in biceps, brachioradialis, triceps, patellae and achilles. Negative Antony and toes down-going. Sustained ankle clonus present bilaterally (8-10).  Sensory: Intact to light touch, pin, vibration, and " proprioception.  Coordination: FNF and HS without ataxia or dysmetria.  Gait: Deferred due to weakness and unsteadiness.    Pertinent Investigations:    I have personally reviewed most recent and pertinent labs, tests, and radiological images.     Changes today:  -For chronic constipation, has PRN bowel regimen (miralax and senna), scheduled miralax BID.   -Leukocytosis likely due to steroids use, UA negative, procal, CRP added on.   -IVIG Day 4/5, IV  solumedrol 1000mg day 3/5.   -continue clonazepam, holding hydroxyzine    Assessment & Plan:  Shayy Cortez is a 63 year old female with past medical history of cutaneous melanoma of the abdomen s/p one year treatment of nivolumab with development of progressive tremor, transient vertigo, abnormal eye movements and gait instability despite cessation of Nivolumab that started in August 2023. She had no difficulties with her gait prior to August. Unclear etiology at this time, however, with highest suspicion for Stiff Person Syndrome. Parkinsons plus syndromes have been considered given the patient's rigidity, bradykinesia and abnormal eye movements with square wave jerks; however, feel this is less likely given the patient's quickly progressing symptoms with reassuring radiologic findings (no midbrain atrophy or hot cross buns sign). Furthermore, she has had two LP's now (including one this admission) that are suggestive of an inflammatory process with elevated cell counts with lymphocytic predominance, elevated protein, and presence of a couple oligoclonal bands. Given the patient's history of rheumatoid arthritis and rampant family history of autoimmune disease, suspect that an autoimmune process is most likely. Awaiting ESTEPHANIA 65, DPPX, and glycine antibody results. Trialing high dose steroids, IVIG, and benzo's given the severity of her symptoms.     Observing improvement in tone and tremor after the first few doses of IVIG and steroids.     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 Delcid in [facility] She initially endorsed bilateral temporal and occipital headaches with severity of 4/10 being described as dull pain. Additionally endorsed mild confusion with start or stop patterns in the mid sentence. She also has neck pain and dizziness since August. Describes vertical diplopia and worse with far away objects. High frequency tremor in the arms and legs with whole body jerks. Her legs feel stiff and weak. She has notable square wave jerks with movement of the eyes, bradykinesia, and hyperreflexia. MRI brain and CTA head and neck unrevealing. In December 22 2023, her Neurologist recommended starting a 5-day course of high-dose solumedrol; however this was not started due to timing issues.  - Started IVIG 20g on 1/10/23 - plan for 5 days (last day 1/14)  - Started solumedrol 1000mg on 1/11/23 - plan for 5 days (last day 1/15)  - If pt amenable to ARU, complete 5 days of steroids; if plans to go home can do 4 days of steroids.   - Upon discharge, send home on slow steroid taper (decrease by 10 mg every week) and IVIG maintenance outpatient.  - Start clonazepam 1 mg TID   - PTA tylenol with good relief of headache  - Follow-up ESTEPHANIA 65, DPPX and glycine antibodies ([place] movement disorders panel both CSF and serum) - pending  - PT/OT/SLP - recommended ARU     Negative / Normal labs  - Lyme Ig  - Treponema  - CSF VDRL, ACE, meningitis and encephalitis PCR panel,  - TRAN  - ANCA  - Paraneoplastic antibodies              - PCAs              - Neuronoal K son              - CRMP              - ANNAs              - Glial nuclear              - Amphiphysin  - MuSK antibodies  - Myasthenia Ab  - [place] Ab  - SSB / SSA  - ACE  - Myeloma / MGUS  - GQ1B Ab  - Serum Oligoclonal bands  - Copper   - Homocysteine  - Methylmalonic acid  - Flow cytometry without lymphoma or blast population      Abnormal /  Elevated labs  - Cadmium / creatinine 1.6  - CSF 12/14/23 clear and colorless, nucleated cell count 33, Protein 78, lymphocytosis 84, glucose 53, Kappa light chain 0.15, Oligoclonal bands 2  - CSF 1/11/23 clear and colorless, nucleated cell count 32, protein 85.2, lymphocytosis 91, glucose 54    # Moderate Cervical Spinal Canal Stenosis of C3 - C7  Incidentally discovered when MRI C Spine performed given patient's significant hyperreflexia with 4+ reflexes in patellars and sustained clonus. Possibly contributing to patient's hyperreflexia but cannot fully explain her presentation, particularly her abnormal eye movements. Furthermore, she has a positive jaw jerk reflex, suggestive of a neurological process affecting her brain. Notably, MRI was without cord signal change; thus do not suspect true myelopathy from her cervical stenosis.       #Urinary Retention  Likely secondary to hydroxyzine that was given prn for anxiety  - Hold hydroxyzine after discussing this with patient      # Leukocytosis  In setting of high dose steroids. No infectious symptoms   - CBC daily    # Anxiety  # Depression   Patient experiencing difficulty sleeping due to anxiety while admitted. PTA Citalopram may be contributing to hyperreflexia.   - Citalopram PTA 40 mg daily  - Hold Hydroxyzine 25mg prn given concerns for urinary retention  - Plan to start clonazepam per above      # Constipation  Good relief with stool softeners.   - PTA Miralax and Senna  - added scheduled miralax BID.     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 Treatment started in November 2022 with last treatment in August 17th 2023, prematurely terminated due to increased neurologic issues as noted. She had two remaining doses of Nivolumab.  - Follows  with Oncology Kailey Davis     # Rheumatoid arthritis  Previously on methotrexate and Etanercept stopped due to immunomodulatory adjuvant therapy.  - Held PTA prednisone 5 mg during steroid burst (see above)     # Hypothyroidism  - PTA Levothyroxine 50 mcg    # Hx of alcohol abuse  - No active interventions    Resolved Conditions:   # Lactic Acidosis, resolved  Lactic acid on admission 4.1 -> 1.5 s/p 2L IVF with normalization. Blood cultures 1/10/23 show NGTD.      DVT Prophylaxis: Lovenox 40mg Q24H  Code Status: Full Code  FEN: Regular diet  Dispo: Pt recommended ARU - though would prefer to do PT at home     Patient was seen and discussed with Dr. Gilman.    Sam Narvaez MD  PGY-1 Neurology Resident

## 2024-01-13 NOTE — PLAN OF CARE
Goal Outcome Evaluation:      Plan of Care Reviewed With: patient, spouse    Overall Patient Progress: improving    A&Ox4, denies pain. Tremors in bilateral upper and lower extremities. Bladder scanned @1300 after note being able to void after AM straight cath, showed 560-straight cathed 600. Pt reported one unmeasured void after straight cath. C/o constipation & last BM being over a week ago-PRN miralax & senna given. Assist x2 to commode. Port running IVG. Continue POC.

## 2024-01-14 LAB
ANION GAP SERPL CALCULATED.3IONS-SCNC: 10 MMOL/L (ref 7–15)
BASOPHILS # BLD AUTO: 0 10E3/UL (ref 0–0.2)
BASOPHILS NFR BLD AUTO: 0 %
BUN SERPL-MCNC: 19.2 MG/DL (ref 8–23)
CALCIUM SERPL-MCNC: 8.3 MG/DL (ref 8.8–10.2)
CHLORIDE SERPL-SCNC: 108 MMOL/L (ref 98–107)
CREAT SERPL-MCNC: 0.79 MG/DL (ref 0.51–0.95)
DEPRECATED HCO3 PLAS-SCNC: 20 MMOL/L (ref 22–29)
EGFRCR SERPLBLD CKD-EPI 2021: 85 ML/MIN/1.73M2
EOSINOPHIL # BLD AUTO: 0 10E3/UL (ref 0–0.7)
EOSINOPHIL NFR BLD AUTO: 0 %
ERYTHROCYTE [DISTWIDTH] IN BLOOD BY AUTOMATED COUNT: 13.1 % (ref 10–15)
GLUCOSE BLDC GLUCOMTR-MCNC: 169 MG/DL (ref 70–99)
GLUCOSE BLDC GLUCOMTR-MCNC: 170 MG/DL (ref 70–99)
GLUCOSE BLDC GLUCOMTR-MCNC: 238 MG/DL (ref 70–99)
GLUCOSE BLDC GLUCOMTR-MCNC: 272 MG/DL (ref 70–99)
GLUCOSE SERPL-MCNC: 132 MG/DL (ref 70–99)
HCT VFR BLD AUTO: 30.1 % (ref 35–47)
HGB BLD-MCNC: 9.9 G/DL (ref 11.7–15.7)
IMM GRANULOCYTES # BLD: 0.3 10E3/UL
IMM GRANULOCYTES NFR BLD: 2 %
LYMPHOCYTES # BLD AUTO: 1 10E3/UL (ref 0.8–5.3)
LYMPHOCYTES NFR BLD AUTO: 6 %
MCH RBC QN AUTO: 30.3 PG (ref 26.5–33)
MCHC RBC AUTO-ENTMCNC: 32.9 G/DL (ref 31.5–36.5)
MCV RBC AUTO: 92 FL (ref 78–100)
MONOCYTES # BLD AUTO: 0.8 10E3/UL (ref 0–1.3)
MONOCYTES NFR BLD AUTO: 6 %
NEUTROPHILS # BLD AUTO: 12.8 10E3/UL (ref 1.6–8.3)
NEUTROPHILS NFR BLD AUTO: 86 %
NRBC # BLD AUTO: 0 10E3/UL
NRBC BLD AUTO-RTO: 0 /100
PLATELET # BLD AUTO: 306 10E3/UL (ref 150–450)
POTASSIUM SERPL-SCNC: 3.4 MMOL/L (ref 3.4–5.3)
RBC # BLD AUTO: 3.27 10E6/UL (ref 3.8–5.2)
SODIUM SERPL-SCNC: 138 MMOL/L (ref 135–145)
WBC # BLD AUTO: 14.9 10E3/UL (ref 4–11)

## 2024-01-14 PROCEDURE — 250N000011 HC RX IP 250 OP 636

## 2024-01-14 PROCEDURE — 120N000002 HC R&B MED SURG/OB UMMC

## 2024-01-14 PROCEDURE — 36592 COLLECT BLOOD FROM PICC: CPT

## 2024-01-14 PROCEDURE — 258N000003 HC RX IP 258 OP 636

## 2024-01-14 PROCEDURE — 80048 BASIC METABOLIC PNL TOTAL CA: CPT

## 2024-01-14 PROCEDURE — 250N000013 HC RX MED GY IP 250 OP 250 PS 637: Performed by: PSYCHIATRY & NEUROLOGY

## 2024-01-14 PROCEDURE — 99232 SBSQ HOSP IP/OBS MODERATE 35: CPT | Mod: GC | Performed by: PSYCHIATRY & NEUROLOGY

## 2024-01-14 PROCEDURE — 250N000013 HC RX MED GY IP 250 OP 250 PS 637

## 2024-01-14 PROCEDURE — 85025 COMPLETE CBC W/AUTO DIFF WBC: CPT

## 2024-01-14 PROCEDURE — 250N000011 HC RX IP 250 OP 636: Mod: JZ | Performed by: PSYCHIATRY & NEUROLOGY

## 2024-01-14 RX ORDER — SENNOSIDES 8.6 MG
8.6 TABLET ORAL 2 TIMES DAILY
Status: DISCONTINUED | OUTPATIENT
Start: 2024-01-14 | End: 2024-01-18 | Stop reason: HOSPADM

## 2024-01-14 RX ORDER — CITALOPRAM HYDROBROMIDE 20 MG/1
20 TABLET ORAL DAILY
Status: DISCONTINUED | OUTPATIENT
Start: 2024-01-15 | End: 2024-01-18 | Stop reason: HOSPADM

## 2024-01-14 RX ADMIN — CITALOPRAM 40 MG: 40 TABLET ORAL at 08:43

## 2024-01-14 RX ADMIN — CLONAZEPAM 1 MG: 0.5 TABLET ORAL at 15:36

## 2024-01-14 RX ADMIN — HUMAN IMMUNOGLOBULIN G 20 G: 20 LIQUID INTRAVENOUS at 18:30

## 2024-01-14 RX ADMIN — SODIUM CHLORIDE 1000 MG: 9 INJECTION, SOLUTION INTRAVENOUS at 11:12

## 2024-01-14 RX ADMIN — GABAPENTIN 300 MG: 300 CAPSULE ORAL at 21:30

## 2024-01-14 RX ADMIN — POLYETHYLENE GLYCOL 3350 17 G: 17 POWDER, FOR SOLUTION ORAL at 08:43

## 2024-01-14 RX ADMIN — INSULIN ASPART 1 UNITS: 100 INJECTION, SOLUTION INTRAVENOUS; SUBCUTANEOUS at 10:48

## 2024-01-14 RX ADMIN — LEVOTHYROXINE SODIUM 50 MCG: 0.05 TABLET ORAL at 08:44

## 2024-01-14 RX ADMIN — CLONAZEPAM 1 MG: 0.5 TABLET ORAL at 20:02

## 2024-01-14 RX ADMIN — DIPHENHYDRAMINE HYDROCHLORIDE 50 MG: 50 CAPSULE ORAL at 17:59

## 2024-01-14 RX ADMIN — ENOXAPARIN SODIUM 40 MG: 40 INJECTION SUBCUTANEOUS at 15:36

## 2024-01-14 RX ADMIN — INSULIN ASPART 2 UNITS: 100 INJECTION, SOLUTION INTRAVENOUS; SUBCUTANEOUS at 16:51

## 2024-01-14 RX ADMIN — PANTOPRAZOLE SODIUM 40 MG: 40 TABLET, DELAYED RELEASE ORAL at 08:44

## 2024-01-14 RX ADMIN — ACETAMINOPHEN 650 MG: 325 TABLET, FILM COATED ORAL at 17:59

## 2024-01-14 RX ADMIN — SENNOSIDES 8.6 MG: 8.6 TABLET, FILM COATED ORAL at 08:43

## 2024-01-14 RX ADMIN — CLONAZEPAM 1 MG: 0.5 TABLET ORAL at 08:44

## 2024-01-14 ASSESSMENT — ACTIVITIES OF DAILY LIVING (ADL)
ADLS_ACUITY_SCORE: 35

## 2024-01-14 NOTE — PROGRESS NOTES
"[facility]  General Neurology - Progress Note    Patient Name:  Shayy Cortez  Date of Service:  January 14, 2024    Subjective:    Patient is feeling better today. She continues to feel less jerky/stiff/tremulous. She was able to ambulate with a walker to the bathroom. Continues to have urinary rentention, ~550-600ml postvoid residual. Was able to urinate x1 yesterday. Patient endorses oliguria but denies dysuria, increased urinary frequency, hematuria, or suprapubic pressure. Leukocytosis likely due to steroids use is now downtrending, no fever/ chills, nauseau/vomiting, or abdominal pain. X1 BM yesterday, scheduled miralax has helped with constipation. Also somewhat tired/drowsy from clonipin but says enough energy to engage with PT. Has been able to walk some short distances. Patient is open to Acute Rehab.     Objective:    Vitals: BP (!) 158/88 (BP Location: Left arm)   Pulse 82   Temp 98.9  F (37.2  C) (Oral)   Resp 18   Ht 1.575 m (5' 2.01\")   SpO2 95%   BMI 19.02 kg/m    General: Lying in bed, NAD  Head: Atraumatic, normocephalic   Cardiac: No lower extremity edema  Neuro:  Mental status: Awake, alert, attentive, oriented to self, time, place, and circumstance. Language is fluent and coherent with intact comprehension of complex commands, naming and repetition.  Cranial nerves: VFF, PERRL - 2mm, conjugate gaze, EOMI without gaze palsy, occasional square wave jerks, still endorses occasional horizontal diplopia, facial sensation intact, face symmetric, shoulder shrug strong, tongue/uvula midline, no dysarthria. No jaw jerk reflex.  Motor: Normal bulk. Increased tone and spasticity in all extremities L > R hemibody. Low-frequency postural and intention tremor - no longer with obvious wing flapping tremors, no whole body jerks - improved from admission. 5/5 strength bilaterally in deltoids, biceps, triceps, hand , hip flexors, hip extensors, knee flexion, knee " extension, plantarflexion, dorsiflexion.   Reflexes: 3+ in biceps, brachioradialis, triceps; 4+ in patellae and achilles. Negative Antony and toes down-going. Sustained Clonus present (8-10+).   Sensory: Intact to light touch, pinprick, vibration, and proprioception.  Coordination: FNF and HS without ataxia. Dysmetric on L FNF but likely in the setting of diplopia.   Gait: Deferred due to weakness and unsteadiness.    Pertinent Investigations:    I have personally reviewed most recent and pertinent labs, tests, and radiological images.     Assessment & Plan:  Shayy Cortez is a 63 year old female with past medical history of cutaneous melanoma of the abdomen s/p one year treatment of nivolumab with development of progressive tremor, transient vertigo, abnormal eye movements and gait instability despite cessation of Nivolumab that started in August 2023. 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 PERM (progressive encephalomyelitis with rigidity and myoclonus) v.s. antibody negative autoimmune process.    Patient had no difficulties with her gait prior to August. Unclear etiology at this time, however, with highest suspicion for Stiff Person Syndrome. PERM is another consideration as it presents similarly and more often with urinary retention (see below). Parkinsons plus syndromes have been considered given the patient's rigidity, bradykinesia and abnormal eye movements with square wave jerks; however, feel this is less likely given the patient's quickly progressing symptoms with reassuring radiologic findings (no midbrain atrophy or hot cross buns sign). Furthermore, she has had two LP's now (including one this admission) that are suggestive of an inflammatory process with elevated cell counts with lymphocytic predominance, elevated protein, and presence of a couple oligoclonal bands. Given the patient's history of rheumatoid arthritis and rampant family history of autoimmune disease, suspect that an  autoimmune process is most likely. Awaiting ESTEPHANIA 65, DPPX, and glycine antibody results. If panel returns negative, could be an autoimmune, antibody negative process. Trialing high dose steroids, IVIG, and benzo's given the severity of her symptoms.     Observing improvement in tone, hyperreflexia, and tremor after start of the IVIG and steroids. Plan for discharge after last dose of steroids 1/15 to ARU. Referral placed for neuroimmunology, likely will need to continue IVIG outpatient post-rehab.     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 Delcid in [facility]. She initially endorsed bilateral temporal and occipital headaches with severity of 4/10 being described as dull pain. Initially presented with mild confusion, dizziness, vertical diplopia, square wave jerks with movement of the eyes, high frequency tremor in the arms and legs with whole body jerks,  bradykinesia, and hyperreflexia. MRI brain and CTA head and neck unrevealing. In December 22 2023, her Neurologist recommended starting a 5-day course of high-dose solumedrol; however this was not started due to timing issues. Completed 5-day course of solumedrol and IVIG alongside 2 days of clonazepam; overall, significant improvement in ataxia and tremors. Plan to discontinue to ARU with steroid taper.   - IVIG 20g 5/5 dose today   - Solumedrol 1000mg 4/5 dose today (last on 1/15/23)  - Discharge taper plan: Start at 60mg, down by 10mg for 5 weeks; afterwards, continue PTA prednisone 5 mg daily for rheumatoid arthritis   - Continue clonazepam 1 mg TID   - PT/OT/SLP - recommended ARU; consulted SW to set up ARU placement   - Referral placed for outpatient neuroimmunology    -Defer need for pulse IVIG to outpatient neuroimmunology provider  - Follow-up ESTEPHANIA 65, DPPX and glycine antibodies ([place] movement disorders panel both CSF and serum) - pending    Negative / Normal labs  - Lyme  "Ig  - Treponema  - CSF VDRL, ACE, meningitis and encephalitis PCR panel,  - TRAN  - ANCA  - Paraneoplastic antibodies              - PCAs              - Neuronoal K son              - CRMP              - ANNAs              - Glial nuclear              - Amphiphysin  - MuSK antibodies  - Myasthenia Ab  - [place] Ab  - SSB / SSA  - ACE  - Myeloma / MGUS  - GQ1B Ab  - Serum Oligoclonal bands  - Copper   - Homocysteine  - Methylmalonic acid  - Flow cytometry without lymphoma or blast population      Abnormal / Elevated labs  - Cadmium / creatinine 1.6  - CSF 12/14/23 clear and colorless, nucleated cell count 33, Protein 78, lymphocytosis 84, glucose 53, Kappa light chain 0.15, Oligoclonal bands 2  - CSF 1/11/23 clear and colorless, nucleated cell count 32, protein 85.2, lymphocytosis 91, glucose 54    # Moderate Cervical Spinal Canal Stenosis of C3 - C7  Incidentally discovered when MRI C Spine performed given patient's significant hyperreflexia with 4+ reflexes in patellars and sustained clonus. Possibly contributing to patient's hyperreflexia but cannot fully explain her presentation, particularly her abnormal eye movements. Furthermore, she previously had a positive jaw jerk reflex, suggestive of a neurological process affecting her brain. Notably, MRI was without cord signal change; thus do not suspect true myelopathy from her cervical stenosis.       #Urinary Retention  Likely secondary to hydroxyzine that was given prn for anxiety or Bendaryl given with IVIG. Some improvement with stopping hydroxyzine however intermittent urinary rentention ~500-600ml still present. Possibly could also be dysautonomia secondary to her autoimmune process (urinary retention may occur more frequently in \"PERM\" compared to stiff person syndrome). Last day of Bendaryl/IVIG today, expect continued improvement in rehab. Reassuring that patient endorses spontaneous ability to urinate x 1 on 1/13/24.  - Continue to hold " hydroxyzine  - Monitor for retention in rehab, consider catheter if continues     # Leukocytosis  In setting of high dose steroids. No infectious symptoms. UA, CRP and procal negative, low concern for UTI. Now downtrending.   - CBC daily    # Anxiety  # Depression   Patient experiencing difficulty sleeping due to anxiety while admitted. PTA Citalopram may be contributing to hyperreflexia.   - Citalopram PTA 40 mg daily  - Stopped Hydroxyzine 25mg prn given concerns for urinary retention     # Constipation  Good relief with stool softeners.   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 Treatment started in November 2022 with last treatment in August 17th 2023, prematurely terminated due to increased neurologic issues as noted. She had two remaining doses of Nivolumab.  - Follows with Oncology Kailey Davis     # Rheumatoid arthritis  Previously on methotrexate and Etanercept stopped due to immunomodulatory adjuvant therapy.  - Held PTA prednisone 5 mg during steroid burst (see above)     # Hypothyroidism  - PTA Levothyroxine 50 mcg    # Hx of alcohol abuse  - No active interventions    Resolved Conditions:   # Lactic Acidosis, resolved  Lactic acid on admission 4.1 -> 1.5 s/p 2L IVF with normalization. Blood cultures 1/10/23 show NGTD.      DVT Prophylaxis: Lovenox 40mg Q24H  Code Status: Full Code  FEN: Regular diet  Dispo: Pt recommended ARU - though would prefer to do PT at home     Nidia Yoon, MS3    Resident/Fellow Attestation   I, Liz Ro MD, was present with the medical/YVONNE student who participated in the service and in the documentation of the note.  I have verified the history and personally performed the physical exam and medical decision making.  I agree  with the assessment and plan of care as documented in the note.      Liz Ro MD  PGY-3 Neurology Resident     Patient was seen and discussed with Dr. Gilman.

## 2024-01-14 NOTE — PLAN OF CARE
Goal Outcome Evaluation:      Plan of Care Reviewed With: patient    Overall Patient Progress: improving    Pt states tremors feel improved, still shaky when standing, assist x2 w/ walker & GB. Plan to continue monitoring for urinary retention & straight cath if needed. Has been voiding in good amounts, 2x BMs today. Denies pain. Discharge to TCU when bed available. Continue POC.

## 2024-01-14 NOTE — PROVIDER NOTIFICATION
Provider Notification: After 48 hours of voiding protocol, pt still unable to void or voids small amount, pt needing to be straight cath. Please advise.     DATE/TIME 01/14/24 at 3:20 AM   PROVIDER NOTIFIED?: Yes  PROVIDER NAME: Neurology resident Navjot Mishra MD paged on [phone].   MECHANISM OF PROVIDER NOTIFICATION: Page  PROVIDER RESPONSE: Continue with straight cath protocol and update the daytime Provider.

## 2024-01-14 NOTE — PROGRESS NOTES
Care Management Follow Up    Length of Stay (days): 5    Expected Discharge Date: 01/15/2024     Concerns to be Addressed: discharge planning     Patient plan of care discussed at interdisciplinary rounds: Yes    Anticipated Discharge Disposition: Acute Rehab     Anticipated Discharge Services: None  Anticipated Discharge DME: None    Patient/family educated on Medicare website which has current facility and service quality ratings: no  Education Provided on the Discharge Plan: Yes  Patient/Family in Agreement with the Plan: yes    Referrals Placed by CM/SW:    Private pay costs discussed: Not applicable    Additional Information:  Per medical team, patient will be medically stable for ARU tomorrow after dose of medication tomorrow morning. I contacted  ARU and provided update that patient will be med ready tomorrow.       Anusha Palomo, [facility]  1/14/2024       Social Work and Care Management Department       SEARCHABLE in [employer] - search SOCIAL WORK       [place] (0800 - 1630) Saturday and Sunday     Units: 4A, 4C, & 4E Pager: [phone]     Units: 5A & 5C Pager: [phone]     Units: 6A & 6B   Pager: [phone]     Units: 6C Pager: [phone]     Units: 7A & 7B  Pager: [phone]     Units: 7C Pager: [phone]     Unit: [place] ED Pager: [phone]      [facility] (2165-0904) Saturday and Sunday      Units: 5 Ortho, 5 Med/Surg & WB ED  Pager:[phone]     Units: 6 Med/Surg, 8A, & 10A ICU  Pager: [phone]        After hours (1630 - 0000) Saturday & Sunday; (8433-7747) Mon-Fri; (3549-1449)  Recognized Holidays     Units: ALL  Pager: [phone]

## 2024-01-14 NOTE — PLAN OF CARE
Goal Outcome Evaluation:      Plan of Care Reviewed With: patient    Overall Patient Progress: improving    Shift [id number]     Pt is A&Ox4. Denies pain and nausea. Extremities tremors-improving. On scheduled Klonopin & Gabapentin at bedtime. Tolerating regular diet.  & 170. Mobility improving. Up to BSC with assist x1. Unable to void, bladder scan 607 ml; straight cath 550 ml (Neurology team updated, see previous note). +BS, small BM overnight. No acute changes this. Plan to discharge to  acute rehab. Continue POC.

## 2024-01-14 NOTE — PROVIDER NOTIFICATION
7[address]  seeking clarfication, do you want IVg to be given now or at its previous scheduled time @ 1830? premeds for IVg still scheduled for 1800.  thanks, Jaylin REESE [phone]

## 2024-01-15 ENCOUNTER — APPOINTMENT (OUTPATIENT)
Dept: PHYSICAL THERAPY | Facility: CLINIC | Age: 64
End: 2024-01-15
Payer: COMMERCIAL

## 2024-01-15 ENCOUNTER — APPOINTMENT (OUTPATIENT)
Dept: OCCUPATIONAL THERAPY | Facility: CLINIC | Age: 64
End: 2024-01-15
Payer: COMMERCIAL

## 2024-01-15 LAB
ANION GAP SERPL CALCULATED.3IONS-SCNC: 10 MMOL/L (ref 7–15)
BACTERIA BLD CULT: NO GROWTH
BACTERIA BLD CULT: NO GROWTH
BACTERIA CSF CULT: NO GROWTH
BASOPHILS # BLD AUTO: ABNORMAL 10*3/UL
BASOPHILS # BLD MANUAL: 0 10E3/UL (ref 0–0.2)
BASOPHILS NFR BLD AUTO: ABNORMAL %
BASOPHILS NFR BLD MANUAL: 0 %
BUN SERPL-MCNC: 21.3 MG/DL (ref 8–23)
CALCIUM SERPL-MCNC: 8.8 MG/DL (ref 8.8–10.2)
CHLORIDE SERPL-SCNC: 106 MMOL/L (ref 98–107)
CREAT SERPL-MCNC: 0.84 MG/DL (ref 0.51–0.95)
DEPRECATED HCO3 PLAS-SCNC: 21 MMOL/L (ref 22–29)
EGFRCR SERPLBLD CKD-EPI 2021: 79 ML/MIN/1.73M2
EOSINOPHIL # BLD AUTO: ABNORMAL 10*3/UL
EOSINOPHIL # BLD MANUAL: 0 10E3/UL (ref 0–0.7)
EOSINOPHIL NFR BLD AUTO: ABNORMAL %
EOSINOPHIL NFR BLD MANUAL: 0 %
ERYTHROCYTE [DISTWIDTH] IN BLOOD BY AUTOMATED COUNT: 13 % (ref 10–15)
GLUCOSE BLDC GLUCOMTR-MCNC: 152 MG/DL (ref 70–99)
GLUCOSE BLDC GLUCOMTR-MCNC: 158 MG/DL (ref 70–99)
GLUCOSE BLDC GLUCOMTR-MCNC: 167 MG/DL (ref 70–99)
GLUCOSE BLDC GLUCOMTR-MCNC: 184 MG/DL (ref 70–99)
GLUCOSE BLDC GLUCOMTR-MCNC: 205 MG/DL (ref 70–99)
GLUCOSE SERPL-MCNC: 145 MG/DL (ref 70–99)
GRAM STAIN RESULT: NORMAL
GRAM STAIN RESULT: NORMAL
HCT VFR BLD AUTO: 29.9 % (ref 35–47)
HGB BLD-MCNC: 10 G/DL (ref 11.7–15.7)
IMM GRANULOCYTES # BLD: ABNORMAL 10*3/UL
IMM GRANULOCYTES NFR BLD: ABNORMAL %
LYMPHOCYTES # BLD AUTO: ABNORMAL 10*3/UL
LYMPHOCYTES # BLD MANUAL: 0.9 10E3/UL (ref 0.8–5.3)
LYMPHOCYTES NFR BLD AUTO: ABNORMAL %
LYMPHOCYTES NFR BLD MANUAL: 7 %
MCH RBC QN AUTO: 30.3 PG (ref 26.5–33)
MCHC RBC AUTO-ENTMCNC: 33.4 G/DL (ref 31.5–36.5)
MCV RBC AUTO: 91 FL (ref 78–100)
MONOCYTES # BLD AUTO: ABNORMAL 10*3/UL
MONOCYTES # BLD MANUAL: 0.1 10E3/UL (ref 0–1.3)
MONOCYTES NFR BLD AUTO: ABNORMAL %
MONOCYTES NFR BLD MANUAL: 1 %
NEUTROPHILS # BLD AUTO: ABNORMAL 10*3/UL
NEUTROPHILS # BLD MANUAL: 11.3 10E3/UL (ref 1.6–8.3)
NEUTROPHILS NFR BLD AUTO: ABNORMAL %
NEUTROPHILS NFR BLD MANUAL: 92 %
NRBC # BLD AUTO: 0 10E3/UL
NRBC BLD AUTO-RTO: 0 /100
PLAT MORPH BLD: ABNORMAL
PLATELET # BLD AUTO: 305 10E3/UL (ref 150–450)
POTASSIUM SERPL-SCNC: 3.7 MMOL/L (ref 3.4–5.3)
RBC # BLD AUTO: 3.3 10E6/UL (ref 3.8–5.2)
RBC MORPH BLD: ABNORMAL
SODIUM SERPL-SCNC: 137 MMOL/L (ref 135–145)
WBC # BLD AUTO: 12.3 10E3/UL (ref 4–11)

## 2024-01-15 PROCEDURE — 36415 COLL VENOUS BLD VENIPUNCTURE: CPT

## 2024-01-15 PROCEDURE — 97116 GAIT TRAINING THERAPY: CPT | Mod: GP | Performed by: PHYSICAL THERAPIST

## 2024-01-15 PROCEDURE — 120N000002 HC R&B MED SURG/OB UMMC

## 2024-01-15 PROCEDURE — 80048 BASIC METABOLIC PNL TOTAL CA: CPT

## 2024-01-15 PROCEDURE — 250N000011 HC RX IP 250 OP 636

## 2024-01-15 PROCEDURE — 97530 THERAPEUTIC ACTIVITIES: CPT | Mod: GO

## 2024-01-15 PROCEDURE — 250N000013 HC RX MED GY IP 250 OP 250 PS 637: Performed by: PSYCHIATRY & NEUROLOGY

## 2024-01-15 PROCEDURE — 85027 COMPLETE CBC AUTOMATED: CPT

## 2024-01-15 PROCEDURE — 250N000013 HC RX MED GY IP 250 OP 250 PS 637

## 2024-01-15 PROCEDURE — 97530 THERAPEUTIC ACTIVITIES: CPT | Mod: GP | Performed by: PHYSICAL THERAPIST

## 2024-01-15 PROCEDURE — 85007 BL SMEAR W/DIFF WBC COUNT: CPT

## 2024-01-15 PROCEDURE — 99232 SBSQ HOSP IP/OBS MODERATE 35: CPT | Mod: GC | Performed by: PSYCHIATRY & NEUROLOGY

## 2024-01-15 PROCEDURE — 97535 SELF CARE MNGMENT TRAINING: CPT | Mod: GO

## 2024-01-15 PROCEDURE — 258N000003 HC RX IP 258 OP 636

## 2024-01-15 RX ORDER — CLONAZEPAM 1 MG/1
1 TABLET ORAL 2 TIMES DAILY
Qty: 60 TABLET | Refills: 0 | Status: CANCELLED | OUTPATIENT
Start: 2024-01-15 | End: 2024-02-14

## 2024-01-15 RX ORDER — CLONAZEPAM 0.5 MG/1
1 TABLET ORAL 2 TIMES DAILY
Status: DISCONTINUED | OUTPATIENT
Start: 2024-01-15 | End: 2024-01-16

## 2024-01-15 RX ORDER — SULFAMETHOXAZOLE/TRIMETHOPRIM 800-160 MG
1 TABLET ORAL
Status: DISCONTINUED | OUTPATIENT
Start: 2024-01-17 | End: 2024-01-18 | Stop reason: HOSPADM

## 2024-01-15 RX ORDER — VITAMIN B COMPLEX
50 TABLET ORAL DAILY
Status: DISCONTINUED | OUTPATIENT
Start: 2024-01-15 | End: 2024-01-18 | Stop reason: HOSPADM

## 2024-01-15 RX ORDER — SULFAMETHOXAZOLE/TRIMETHOPRIM 800-160 MG
1 TABLET ORAL ONCE
Status: COMPLETED | OUTPATIENT
Start: 2024-01-15 | End: 2024-01-15

## 2024-01-15 RX ORDER — CALCIUM CARBONATE 500(1250)
500 TABLET ORAL 2 TIMES DAILY WITH MEALS
Status: DISCONTINUED | OUTPATIENT
Start: 2024-01-15 | End: 2024-01-18 | Stop reason: HOSPADM

## 2024-01-15 RX ADMIN — ENOXAPARIN SODIUM 40 MG: 40 INJECTION SUBCUTANEOUS at 14:12

## 2024-01-15 RX ADMIN — CLONAZEPAM 1 MG: 0.5 TABLET ORAL at 20:03

## 2024-01-15 RX ADMIN — CITALOPRAM HYDROBROMIDE 20 MG: 20 TABLET ORAL at 08:58

## 2024-01-15 RX ADMIN — INSULIN ASPART 1 UNITS: 100 INJECTION, SOLUTION INTRAVENOUS; SUBCUTANEOUS at 08:59

## 2024-01-15 RX ADMIN — PANTOPRAZOLE SODIUM 40 MG: 40 TABLET, DELAYED RELEASE ORAL at 08:59

## 2024-01-15 RX ADMIN — Medication 50 MCG: at 14:45

## 2024-01-15 RX ADMIN — CLONAZEPAM 1 MG: 0.5 TABLET ORAL at 08:58

## 2024-01-15 RX ADMIN — INSULIN ASPART 1 UNITS: 100 INJECTION, SOLUTION INTRAVENOUS; SUBCUTANEOUS at 12:23

## 2024-01-15 RX ADMIN — SULFAMETHOXAZOLE AND TRIMETHOPRIM 1 TABLET: 800; 160 TABLET ORAL at 14:12

## 2024-01-15 RX ADMIN — INSULIN ASPART 1 UNITS: 100 INJECTION, SOLUTION INTRAVENOUS; SUBCUTANEOUS at 17:32

## 2024-01-15 RX ADMIN — SODIUM CHLORIDE 1000 MG: 9 INJECTION, SOLUTION INTRAVENOUS at 06:00

## 2024-01-15 RX ADMIN — GABAPENTIN 300 MG: 300 CAPSULE ORAL at 22:42

## 2024-01-15 RX ADMIN — CALCIUM 500 MG: 500 TABLET ORAL at 17:32

## 2024-01-15 RX ADMIN — LEVOTHYROXINE SODIUM 50 MCG: 0.05 TABLET ORAL at 08:59

## 2024-01-15 ASSESSMENT — ACTIVITIES OF DAILY LIVING (ADL)
ADLS_ACUITY_SCORE: 35
ADLS_ACUITY_SCORE: 36
ADLS_ACUITY_SCORE: 36
ADLS_ACUITY_SCORE: 35

## 2024-01-15 NOTE — PLAN OF CARE
Goal Outcome Evaluation:      Plan of Care Reviewed With: patient    Overall Patient Progress: improving      Shift [id number]     Pt is A&Ox4, pleasant, anxious at times. Denies pain and nausea. On scheduled Klonopin & Gabapentin. Immune globulin completed tolerated well. Solu-Medrol infusion done today @0600 via L chest port. Tolerating regular diet, good appetite.  & 158.  Extremities tremors and mobility is improving, still shaky when standing. Up to BSC with assist x1. Voided 25 ml; bladder scan for 517 ml; straight cath 400 ml. +BS, +flatus, +large soft stool. No acute changes this.  is supportive at bedside. Plan to discharge today to  acute rehab. Continue POC.

## 2024-01-15 NOTE — PROGRESS NOTES
Care Management Follow Up    Length of Stay (days): 6    Expected Discharge Date: 01/17/2024     Concerns to be Addressed: discharge planning     Patient plan of care discussed at interdisciplinary rounds: Yes    Anticipated Discharge Disposition:  Acute Rehab     Anticipated Discharge Services: None  Anticipated Discharge DME: None    Patient/family educated on Medicare website which has current facility and service quality ratings: no  Education Provided on the Discharge Plan: Yes  Patient/Family in Agreement with the Plan: yes    Referrals Placed by CM/SW:    Private pay costs discussed: Not applicable    Additional Information:    SW followed up with  ARU liaison regarding patients referral. The liaison said pt received IV solumedrol and she will need to be stable of IV medications before coming to  ARU. The liaison also said the pt will need insurance authorization.  ARU liaison will submit for insurance authorization today, although there may be a delay due to the holiday today (1/15 is MLK day).    CODIE spoke to Neurology team- pt is currently medically ready for TCU. SW relayed the barriers of IV meds and insurance authorization. The MD said the pt will be done with IV solumedrol today. SW will continue to follow for discharge planning.    Addendum:    CODIE received a message from  ARU liaison saying there may be a bed available for the patient tomorrow afternoon.  ARU liaison requested SW arrange a ride between 2-3pm tomorrow afternoon. CODIE arranged an [facility] [employer] transport ([phone]) w/c ride between 2:51-3:36pm. CODIE updated the medical team, the patient and 7B charge RN. Patient is agreeable to the discharge plan.    SW to follow-up with ARU liaison tomorrow to confirm discharge.      JUAN DiasW   7B    Phone: [phone]  Pager: [phone]  Melissa

## 2024-01-15 NOTE — INTERIM SUMMARY
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 Pt completed 5 day course of solumedrol and IVIG w/ improvement in tone, hyperreflexia, and tremor noted.  In addition to managing her neurologic status, she also has required medical mgmt of her urinary retention, constipation, blood sugars, pain, and mental health. She is medically stable and ready to discharge to acute inpatient rehab.   Normally, Shayy is fully independent w/ all mobility, ADLs, driving, and managing her own finances and IADLs.  Currently she is requiring Ax1 for all mobility and ADLs.  She demos impaired postural control when sitting to perform ADLs, needing Ax1 for trunk control to prevent falling over. The patient requires an intensive inpatient rehab program to address the following acute impairments: pain, tremors in eyes affecting her vision, impaired tone, lightheadedness, weakness, deconditioning, impaired fine motor coordination, tremors, and impaired postural control impacting her ability to safely mobilize and perform ADLs. The patient requires transfer to [facility] for intensive therapies not available in a lesser level of care including PT/OT, ongoing medical management at least 3 days per week, and rehabilitative nursing care as the ultimate treatment for stiff-person syndrome is  directed at controlling symptoms to improve mobility and function.     Current Active Medical Management Needs/Risks for Clinical Complications  The patient requires the high level of rehabilitation physician supervision that accompanies the provision of intensive rehabilitation therapy.  The patient needs the services of the rehabilitation physician to assess the patient medically and functionally and to modify the course of treatment as needed to maximize the patient's capacity to benefit from the rehabilitation process.  The patient requires physician oversight at least 3x/wk to medically manage and assess:  Neurologic status: educate pt on diagnosis, treatment, and prognosis of stiff-person syndrome. Assess neurologic status and titrate dosing of medications to optimize control of her symptoms. Pt currently started on steroid taper (Start at 60mg, down by 10mg for 5 weeks; afterwards, continue PTA prednisone 5 mg daily for rheumatoid arthritis) on 1/18/24, also on Conazepam 1mg BID. Pt at risk for progressive muscle stiffness, rigidity, spasm involving the axial muscles. She is at high risk for falls w/ fractures in setting of osteoporosis. She is also at risk for persistent muscle spasm causing irreversible large joint contractures, especially at the hip, knee, ankle, and shoulder. Pt also at risk for episodic spasms that are precipitated by sudden movement, noise, or emotional upset.  She may be at risk for autonomic dysfunction - continue to assess for cardiovascular stability.   Pain: Patient will need ongoing assessment and adjustment of pain medications for optimal participation in therapies. On Gabapentin.  Blood sugars: pt at risk for steroid induced hyperglycemia, requiring use of insulin. Continue to assess BG while on steroid taper. BG   in past 48 hours.   Mental health: pt noted to have difficulty sleeping, as well as depression and anxiety.  Pt on Citalopram. Hydroxyzine discontinued d/t  concern it may be contributing to hyperreflexia. Consult health psychology as needed to assist w/ mgmt of mental health.   Bladder function in setting of urinary retention:  pt w/ intermittent urinary retention - assist w/ bladder mgmt and monitor for ongoing retention. Urinary retention places pt at risk for falls, infection, incontinence.   Bowel function in setting of constipation: manage bowel regimen - on Miralax and Senna.   Prophylaxis: Bactrim DS 3 times per week for PJP prophylaxis (1/15-) until high dose steroid treament completed.   She is at risk for DVT (on Lovenox).     Past Medical/Surgical History  Surgery in the past 100 days: No  Additional relevant past medical history: melanoma of abdominal wall s/p Nivolumab, RA, hypothyroidism, alcohol abuse, anxiety, depression, tobacco abuse, anemia, osteoporosis    Level of Functioning Prior to Admission:  LIVING ENVIRONMENT  People in Home: significant other  Current Living Arrangements: mobile home  Home Accessibility: stairs to enter home  Number of Stairs, Main Entrance: 4  Stair Railings, Main Entrance: railings safe and in good condition  Transportation Anticipated: family or friend will provide  Living Environment Comments: Pt lives in mobile home with partner, Jorge. Tub shower. Does not have support while Jorge is at work.    SELF-CARE  Usual Activity Tolerance: moderate  Regular Exercise: No  Equipment Currently Used at Home: walker, rolling  Activity/Exercise/Self-Care Comment: Pt reports IND/Mod I at baseline but has been mostly bedridden over the last 2 weeks. Partner has been DEP showering pt and assisting with ADLs.    Level of Function: GG Scale (Section GG Functional Ability and Goals; CMS's LINK Version 3.0 Manual effective 10.1.2019):  PT Current Function Goals for Rehab   Bed Rolling 4 Supervision or touching assitance 6 Independent   Supine to Sit 4 Supervision or touching assitance 6 Independent   Sit to Stand 4 Supervision or touching  assitance 6 Independent   Transfer 4 Supervision or touching assitance 6 Independent   Ambulation 1 Dependent 6 Independent   Stairs 88 Not attempted due to safety 4 Supervision or touching assitance     OT Current Function Goals for Rehab   Feeding 6 Independent 6 Independent   Grooming 4 Supervision or touching assitance 6 Independent   Bathing Not completed 6 Independent   Upper Body Dressing 3 Partial/moderate assistance 6 Independent   Lower Body Dressing 3 Partial/moderate assistance 6 Independent   Toileting 2 Substantial/maximal assistance 6 Independent   Toilet Transfer 3 Partial/moderate assistance 6 Independent   Tub/Shower Transfer Not completed 6 Independent   Cognition Not Impaired Independent     SLP Current Function Goals for Rehab   Swallow Not Impaired Not applicable   Communication Not Impaired Not applicable     Current Diet:  0-Thin and 7-Regular    Summary Statement:  The pt has intensive PT and OT needs in setting of stiff person syndrome w/ deficits in postural control, balance, strength, and endurance combined w/ pain, tremors, and fatigue.  These deficits are impacting her ability to safely and independently mobilize. She currently requires CGA for all transfers to WW w/ cues for safe transfer technique and heavily reliance on her UEs for support. She is unable to sit and perform UB/LB dressing tasks without mod A at her trunk as she falls over without UE support when sitting. When performing g/h in standing, she relies compensatory strategies such as on leaning hips and 1 UE against countertop for improved postural control and stability. She needs ongoing instruction on how to safely adapt ADLs and mobility in setting of new neurologic impairments. She ambulates a total of 60 ft w/ WW w/ close w/c follow w/ 1 seated rest break w/ reports of 10/10 fatigue on OMNI scale. She reports fatigue, dizziness, lightheadedness, and shortness of breath w/ ambulation. She would benefit from intensive  rehab w/ skilled PT/OT services, rehab nursing cares, and PMR oversight to optimize her medical and functional status in setting of stiff-person syndrome.     Expected Therapies and Services Required During Inpatient Rehab Admission  Intensity of Therapy: Patient requires intensive therapies not available in a lesser level of care. Patient is motivated, making gains, and can tolerate 3 hours of therapy a day.  Physical Therapy: 90 minutes per day, 6 days a week for 8 days  Occupational Therapy: 90 minutes per day, 6 days a week for 8 days  Speech and Language Therapy: No SLP needs anticipated.   Rehabilitation Nursing Needs: Patient requires 24 hour Rehab Nursing to manage bowel program, bladder program, vitals, medication education, tone management, positioning, carryover of new rehab techniques, care coordination, skin integrity, blood sugar management, assess neurologic status, pain management, and provide safe environment for patient at falls risk.    Precautions/restrictions/special needs:  Precautions: fall precautions  Restrictions: no known restrictions  Special Needs: none    Expected Level of Improvement: Pt will achieve a level of mod IND for mobility and ADLs, and CGA on stairs in order to safely return home.   Expected Length of time to achieve: 8 days    Anticipated Discharge Needs:  Anticipated Discharge Destination: Home  Anticipated Discharge Support:  significant other  24/7 support available : No  Identified caregiver(s):  significant other Bernie  Anticipated Discharge Needs: Home with homecare and Home with outpatient therapy    Identified challenges/barriers: none.     Liaison signature/date/time:    Physician statement of review and agreement:  I have reviewed and am in agreement of the need for IRF stay to address above functional and medical needs. In addition to above statements address, Patient requires intensive active and ongoing therapeutic intervention and multiple therapies; Patient  requires medical supervision; Expected to actively participate in the intensive rehab program; Sufficiently stable to actively participate; Expectation for measurable improvement in functional capacity or adaption to impairments.    MD signature/date/time:

## 2024-01-15 NOTE — PROGRESS NOTES
"[facility]  General Neurology - Progress Note    Patient Name:  Shayy Cortez  Date of Service:  January 15, 2024    Subjective:    Patient is feeling better today. She continues to feel less jerky/stiff/tremulous.  She states there is less pain and anxiety due to muscle rigidity and spasm. She feels clonazepam has helped with her symptoms but also makes her tired. Hence, will reduce the dose of clonazepam. Did have large soft BM. Overnight bladder scan for 517 ml; straight cath 400 ml.  Since her gait is more steady compared to prior and was able to walk short distance without assistance.     Objective:    Vitals: BP (!) 161/90 (BP Location: Left arm, Patient Position: Supine, Cuff Size: Adult Small)   Pulse 91   Temp 98.2  F (36.8  C) (Oral)   Resp 18   Ht 1.575 m (5' 2.01\")   SpO2 96%   BMI 19.02 kg/m    General: Lying in bed, NAD  Head: Atraumatic, normocephalic   Cardiac: No lower extremity edema  Neuro:  Mental status: Awake, alert, attentive, oriented to self, time, place, and circumstance. Language is fluent and coherent with intact comprehension of complex commands, naming and repetition.  Cranial nerves: VFF, PERRL - 2mm, conjugate gaze, EOMI without gaze palsy, occasional square wave jerks, still endorses occasional horizontal diplopia, facial sensation intact, face symmetric, shoulder shrug strong, tongue/uvula midline, no dysarthria. No jaw jerk reflex.  Motor: Normal bulk. Increased tone and spasticity in all extremities L > R hemibody. Low-frequency postural and intention tremor - no longer with obvious wing flapping tremors, no whole body jerks - improved from admission. 5/5 strength bilaterally in deltoids, biceps, triceps, hand , hip flexors, hip extensors, knee flexion, knee extension, plantarflexion, dorsiflexion.   Reflexes: 3+ in biceps, brachioradialis, triceps; 4+ in patellae and achilles. Negative Antony and toes down-going. Sustained Clonus " present (8-10+).   Sensory: Intact to light touch, pinprick, vibration, and proprioception.  Coordination: FNF and HS without ataxia. Dysmetric on L FNF but likely in the setting of diplopia.   Gait: Deferred due to weakness and unsteadiness.    Pertinent Investigations:    I have personally reviewed most recent and pertinent labs, tests, and radiological images.     Assessment & Plan:  Shayy Cortez is a 63 year old female with past medical history of cutaneous melanoma of the abdomen s/p one year treatment of nivolumab with development of progressive tremor, transient vertigo, abnormal eye movements and gait instability despite cessation of Nivolumab that started in August 2023. 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 PERM (progressive encephalomyelitis with rigidity and myoclonus) v.s. antibody negative autoimmune process.    Patient had no difficulties with her gait prior to August. Unclear etiology at this time, however, with highest suspicion for Stiff Person Syndrome. PERM is another consideration as it presents similarly and more often with urinary retention (see below). Parkinsons plus syndromes have been considered given the patient's rigidity, bradykinesia and abnormal eye movements with square wave jerks; however, feel this is less likely given the patient's quickly progressing symptoms with reassuring radiologic findings (no midbrain atrophy or hot cross buns sign). Furthermore, she has had two LP's now (including one this admission) that are suggestive of an inflammatory process with elevated cell counts with lymphocytic predominance, elevated protein, and presence of a couple oligoclonal bands. Given the patient's history of rheumatoid arthritis and rampant family history of autoimmune disease, suspect that an autoimmune process is most likely. Awaiting ESTEPHANIA 65, DPPX, and glycine antibody results. If panel returns negative, could be an autoimmune, antibody negative process. Trialing  high dose steroids, IVIG, and benzo's given the severity of her symptoms.     Observing improvement in tone, hyperreflexia, and tremor after start of the IVIG and steroids. Plan for discharge after last dose of steroids 1/15 to ARU. Referral placed for neuroimmunology, likely will need to continue IVIG outpatient post-rehab.     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 Delcid in [facility] [place]. She initially endorsed bilateral temporal and occipital headaches with severity of 4/10 being described as dull pain. Initially presented with mild confusion, dizziness, vertical diplopia, square wave jerks with movement of the eyes, high frequency tremor in the arms and legs with whole body jerks,  bradykinesia, and hyperreflexia. MRI brain and CTA head and neck unrevealing. In December 22 2023, her Neurologist recommended starting a 5-day course of high-dose solumedrol; however this was not started due to timing issues. Completed 5-day course of solumedrol and IVIG alongside 2 days of clonazepam; overall, significant improvement in ataxia and tremors. Plan to discontinue to ARU with steroid taper.   - IVIG 20g 5/5 dose 1/10/23-1/14/23   - Solumedrol 1000mg 5/5 dose (last on 1/15/23)  - Discharge taper plan: Start at 60mg, down by 10mg for 5 weeks; afterwards, continue PTA prednisone 5 mg daily for rheumatoid arthritis   - Clonazepam 1 mg BID  - PT/OT/SLP - recommended ARU; consulted SW to set up ARU placement   - Referral placed for outpatient neuroimmunology    -Defer need for pulse IVIG to outpatient neuroimmunology provider  - Follow-up ESTEPHANIA 65, DPPX and glycine antibodies ([place] movement disorders panel both CSF and serum) - pending  - Supine and standing blood pressures do not meet the criteria orthostatic hypotension. Will repeat tests if patient continues to have symptoms  - Bactrim DS 3 times per week for PJP prophylaxis (1/15-) until high  dose steroid treament completed.  -Refer to neurology clinic.    Negative / Normal labs  - Lyme Ig  - Treponema  - CSF VDRL, ACE, meningitis and encephalitis PCR panel,  - TRAN  - ANCA  - Paraneoplastic antibodies              - PCAs              - Neuronoal K son              - CRMP              - ANNAs              - Glial nuclear              - Amphiphysin  - MuSK antibodies  - Myasthenia Ab  - [place]aton Ab  - SSB / SSA  - ACE  - Myeloma / MGUS  - GQ1B Ab  - Serum Oligoclonal bands  - Copper   - Homocysteine  - Methylmalonic acid  - Flow cytometry without lymphoma or blast population      Abnormal / Elevated labs  - Cadmium / creatinine 1.6  - CSF 12/14/23 clear and colorless, nucleated cell count 33, Protein 78, lymphocytosis 84, glucose 53, Kappa light chain 0.15, Oligoclonal bands 2  - CSF 1/11/23 clear and colorless, nucleated cell count 32, protein 85.2, lymphocytosis 91, glucose 54    # Moderate Cervical Spinal Canal Stenosis of C3 - C7  Incidentally discovered when MRI C Spine performed given patient's significant/ Hyperreflexia with 4+ reflexes in patellars and sustained clonus. Possibly contributing to patient's hyperreflexia but cannot fully explain her presentation, particularly her abnormal eye movements. Furthermore, she previously had a positive jaw jerk reflex, suggestive of a neurological process affecting her brain. Notably, MRI was without cord signal change; thus do not suspect true myelopathy from her cervical stenosis.  Consider repeat MRI in consult neurosurgery for intervention if symptoms worsen or hyperreflexia does not improve after IVIG/steroid treatment.     #Urinary Retention  Likely secondary to hydroxyzine that was given prn for anxiety or Bendaryl given with IVIG. Some improvement with stopping hydroxyzine however intermittent urinary rentention ~500-600ml still present. Possibly could also be dysautonomia secondary to her autoimmune process (urinary retention may occur more  "frequently in \"PERM\" compared to stiff person syndrome). Last day of Bendaryl/IVIG today, expect continued improvement in rehab. Reassuring that patient endorses spontaneous ability to urinate x 1 on 1/13/24.  - Continue to hold hydroxyzine  - Monitor for retention in rehab, consider catheter if continues     # Leukocytosis  In setting of high dose steroids. No infectious symptoms. UA, CRP and procal negative, low concern for UTI. Now downtrending.   - CBC daily    # Anxiety  # Depression   Patient experiencing difficulty sleeping due to anxiety while admitted. PTA Citalopram may be contributing to hyperreflexia.   - Citalopram PTA 40 mg daily  - Stopped Hydroxyzine 25mg prn given concerns for urinary retention     # Constipation  Good relief with stool softeners.   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 Treatment started in November 2022 with last treatment in August 17th 2023, prematurely terminated due to increased neurologic issues as noted. She had two remaining doses of Nivolumab.  - Follows with Oncology Kaileygigi Davis     # Rheumatoid arthritis  Previously on methotrexate and Etanercept stopped due to immunomodulatory adjuvant therapy.  - Held PTA prednisone 5 mg during steroid burst (see above)     # Hypothyroidism  - PTA Levothyroxine 50 mcg    # Hx of alcohol abuse  - No active interventions    Resolved Conditions:   # Lactic Acidosis, resolved  Lactic acid on admission 4.1 -> 1.5 s/p 2L IVF with normalization. Blood cultures 1/10/23 show NGTD.      DVT Prophylaxis: Lovenox 40mg Q24H  Code Status: Full Code  FEN: Regular diet  Dispo: Medically ready. Acute rehab center placement pending.    Sam Narvaez MD  PGY-1 Neurology Resident     Patient was seen and " discussed with Dr. Gilman.

## 2024-01-15 NOTE — PROGRESS NOTES
Rehab Admissions:  I met w/ Shayy and her significant other Bernie this morning to discuss [facility]. Discussed setup and structure of ARC level of care w/ skilled PT/OT 3 hrs per day 6 days per week, PMR oversight of her medical and rehab needs, and skilled rehab nursing cares. ELOS 10 days. Pt was IND prior to admission - living w/ Bernie in a home w/ 4 CARMEN. Pt needs [employer] [place] insurance auth.     Thank you for the referral, we will continue to follow this patient for post acute placement.     Determination of admission is based upon the patient's need for an intensive, interdisciplinary approach to rehabilitation, their ability to progress, their ability to tolerate intensive therapies, their need for daily physician supervision, their need for twenty four hour nursing assistance, and their ability and willingness to participate in such a program.    Mila Herman CM  Rehab Liaison/  [facility] [place] [facility]  1/15/2024    11:57 AM

## 2024-01-16 ENCOUNTER — APPOINTMENT (OUTPATIENT)
Dept: PHYSICAL THERAPY | Facility: CLINIC | Age: 64
End: 2024-01-16
Payer: COMMERCIAL

## 2024-01-16 LAB
GLUCOSE BLDC GLUCOMTR-MCNC: 117 MG/DL (ref 70–99)
GLUCOSE BLDC GLUCOMTR-MCNC: 138 MG/DL (ref 70–99)
GLUCOSE BLDC GLUCOMTR-MCNC: 156 MG/DL (ref 70–99)
GLUCOSE BLDC GLUCOMTR-MCNC: 165 MG/DL (ref 70–99)
GLUCOSE BLDC GLUCOMTR-MCNC: 176 MG/DL (ref 70–99)

## 2024-01-16 PROCEDURE — 97116 GAIT TRAINING THERAPY: CPT | Mod: GP

## 2024-01-16 PROCEDURE — 99232 SBSQ HOSP IP/OBS MODERATE 35: CPT | Mod: GC | Performed by: PSYCHIATRY & NEUROLOGY

## 2024-01-16 PROCEDURE — 250N000011 HC RX IP 250 OP 636

## 2024-01-16 PROCEDURE — 250N000013 HC RX MED GY IP 250 OP 250 PS 637

## 2024-01-16 PROCEDURE — 120N000002 HC R&B MED SURG/OB UMMC

## 2024-01-16 PROCEDURE — 97530 THERAPEUTIC ACTIVITIES: CPT | Mod: GP

## 2024-01-16 PROCEDURE — 250N000013 HC RX MED GY IP 250 OP 250 PS 637: Performed by: PSYCHIATRY & NEUROLOGY

## 2024-01-16 RX ORDER — CALCIUM CARBONATE 500(1250)
500 TABLET ORAL 2 TIMES DAILY WITH MEALS
Qty: 120 TABLET | Refills: 0 | Status: SHIPPED | OUTPATIENT
Start: 2024-01-16 | End: 2024-01-16

## 2024-01-16 RX ORDER — PREDNISONE 10 MG/1
40 TABLET ORAL DAILY
Qty: 24 TABLET | Refills: 0 | Status: ON HOLD | OUTPATIENT
Start: 2024-01-31 | End: 2024-01-22

## 2024-01-16 RX ORDER — PREDNISONE 10 MG/1
10 TABLET ORAL DAILY
Qty: 6 TABLET | Refills: 0 | Status: ON HOLD | OUTPATIENT
Start: 2024-02-21 | End: 2024-01-22

## 2024-01-16 RX ORDER — PREDNISONE 10 MG/1
20 TABLET ORAL DAILY
Qty: 12 TABLET | Refills: 0 | Status: ON HOLD | OUTPATIENT
Start: 2024-02-14 | End: 2024-01-22

## 2024-01-16 RX ORDER — CLONAZEPAM 0.5 MG/1
0.5 TABLET ORAL EVERY MORNING
Qty: 60 TABLET | Refills: 0 | Status: SHIPPED | OUTPATIENT
Start: 2024-01-17 | End: 2024-01-16

## 2024-01-16 RX ORDER — CLONAZEPAM 0.5 MG/1
0.5 TABLET ORAL DAILY PRN
Qty: 30 TABLET | Refills: 0 | Status: SHIPPED | OUTPATIENT
Start: 2024-01-16 | End: 2024-01-17

## 2024-01-16 RX ORDER — CITALOPRAM HYDROBROMIDE 20 MG/1
20 TABLET ORAL DAILY
Qty: 90 TABLET | Refills: 0 | Status: ON HOLD | OUTPATIENT
Start: 2024-01-16 | End: 2024-01-22

## 2024-01-16 RX ORDER — CLONAZEPAM 0.5 MG/1
0.5 TABLET ORAL EVERY MORNING
Status: DISCONTINUED | OUTPATIENT
Start: 2024-01-17 | End: 2024-01-18 | Stop reason: HOSPADM

## 2024-01-16 RX ORDER — CALCIUM CARBONATE 500(1250)
500 TABLET ORAL 2 TIMES DAILY WITH MEALS
Qty: 120 TABLET | Refills: 0 | Status: ON HOLD | OUTPATIENT
Start: 2024-01-16 | End: 2024-01-22

## 2024-01-16 RX ORDER — PREDNISONE 10 MG/1
30 TABLET ORAL DAILY
Qty: 18 TABLET | Refills: 0 | Status: ON HOLD | OUTPATIENT
Start: 2024-02-07 | End: 2024-01-22

## 2024-01-16 RX ORDER — CLONAZEPAM 0.5 MG/1
0.5 TABLET ORAL DAILY PRN
Status: DISCONTINUED | OUTPATIENT
Start: 2024-01-16 | End: 2024-01-18 | Stop reason: HOSPADM

## 2024-01-16 RX ORDER — PREDNISONE 10 MG/1
5 TABLET ORAL DAILY
Qty: 30 TABLET | Refills: 0 | Status: SHIPPED | OUTPATIENT
Start: 2024-02-28 | End: 2024-01-17

## 2024-01-16 RX ORDER — PREDNISONE 10 MG/1
50 TABLET ORAL DAILY
Qty: 30 TABLET | Refills: 0 | Status: ON HOLD | OUTPATIENT
Start: 2024-01-24 | End: 2024-01-22

## 2024-01-16 RX ORDER — CLONAZEPAM 1 MG/1
1 TABLET ORAL AT BEDTIME
Qty: 60 TABLET | Refills: 0 | Status: SHIPPED | OUTPATIENT
Start: 2024-01-16 | End: 2024-01-16

## 2024-01-16 RX ORDER — SULFAMETHOXAZOLE/TRIMETHOPRIM 800-160 MG
1 TABLET ORAL
Qty: 15 TABLET | Refills: 0 | Status: SHIPPED | OUTPATIENT
Start: 2024-01-17 | End: 2024-01-16

## 2024-01-16 RX ORDER — CLONAZEPAM 0.5 MG/1
0.5 TABLET ORAL EVERY MORNING
Qty: 60 TABLET | Refills: 0 | Status: SHIPPED | OUTPATIENT
Start: 2024-01-17 | End: 2024-01-17

## 2024-01-16 RX ORDER — PREDNISONE 10 MG/1
60 TABLET ORAL DAILY
Qty: 42 TABLET | Refills: 0 | Status: ON HOLD | OUTPATIENT
Start: 2024-01-17 | End: 2024-01-22

## 2024-01-16 RX ORDER — CLONAZEPAM 0.5 MG/1
1 TABLET ORAL AT BEDTIME
Status: DISCONTINUED | OUTPATIENT
Start: 2024-01-16 | End: 2024-01-18 | Stop reason: HOSPADM

## 2024-01-16 RX ORDER — CLONAZEPAM 1 MG/1
1 TABLET ORAL AT BEDTIME
Qty: 60 TABLET | Refills: 0 | Status: ON HOLD | OUTPATIENT
Start: 2024-01-16 | End: 2024-01-25

## 2024-01-16 RX ORDER — CLONAZEPAM 0.5 MG/1
0.5 TABLET ORAL DAILY PRN
Qty: 30 TABLET | Refills: 0 | Status: SHIPPED | OUTPATIENT
Start: 2024-01-16 | End: 2024-01-16

## 2024-01-16 RX ORDER — SULFAMETHOXAZOLE/TRIMETHOPRIM 800-160 MG
1 TABLET ORAL
Qty: 15 TABLET | Refills: 0 | Status: ON HOLD | OUTPATIENT
Start: 2024-01-17 | End: 2024-01-22

## 2024-01-16 RX ADMIN — ENOXAPARIN SODIUM 40 MG: 40 INJECTION SUBCUTANEOUS at 14:34

## 2024-01-16 RX ADMIN — PANTOPRAZOLE SODIUM 40 MG: 40 TABLET, DELAYED RELEASE ORAL at 09:30

## 2024-01-16 RX ADMIN — CALCIUM 500 MG: 500 TABLET ORAL at 21:30

## 2024-01-16 RX ADMIN — GABAPENTIN 300 MG: 300 CAPSULE ORAL at 21:30

## 2024-01-16 RX ADMIN — CALCIUM 500 MG: 500 TABLET ORAL at 09:31

## 2024-01-16 RX ADMIN — CLONAZEPAM 1 MG: 0.5 TABLET ORAL at 21:30

## 2024-01-16 RX ADMIN — LEVOTHYROXINE SODIUM 50 MCG: 0.05 TABLET ORAL at 09:31

## 2024-01-16 RX ADMIN — CITALOPRAM HYDROBROMIDE 20 MG: 20 TABLET ORAL at 09:31

## 2024-01-16 RX ADMIN — CLONAZEPAM 1 MG: 0.5 TABLET ORAL at 09:30

## 2024-01-16 RX ADMIN — Medication 50 MCG: at 09:30

## 2024-01-16 ASSESSMENT — ACTIVITIES OF DAILY LIVING (ADL)
ADLS_ACUITY_SCORE: 36

## 2024-01-16 NOTE — PLAN OF CARE
"Goal Outcome Evaluation:      Plan of Care Reviewed With: patient    Overall Patient Progress: improving    Outcome Evaluation: No acute changes this shift. Patient remains alert and oriented, VSS ex HTN, RA. Denies pain. Denies nausea. Tremors improving. Voiding ability improving - voided 400mL w/ PVR ,<10mL. Good appetite. Ambulating in room with assist x1, GB and FWW.    BP (!) 161/91 (BP Location: Left arm)   Pulse 110   Temp 98.8  F (37.1  C) (Oral)   Resp 18   Ht 1.575 m (5' 2.01\")   SpO2 97%   BMI 19.02 kg/m        "

## 2024-01-16 NOTE — CONSULTS
Care Management Follow Up    Length of Stay (days): 7    Expected Discharge Date: 01/16/2024     Concerns to be Addressed: health care directive  Patient plan of care discussed at interdisciplinary rounds: Yes    Anticipated Discharge Disposition: Acute Rehab     Anticipated Discharge Services: None  Anticipated Discharge DME: None    Patient/family educated on Medicare website which has current facility and service quality ratings: no  Education Provided on the Discharge Plan: Yes  Patient/Family in Agreement with the Plan: yes    Referrals Placed by CM/SW:    Private pay costs discussed: Not applicable    Additional Information:    Met with patient at bedside to address advance directive consult. Patient said she is interested in filling out a HCD. Left a copy at bedside for patient to fill out. SW to follow-up tomorrow to get HCD notarized and scanned into patients chart.       [facility]    Phone: [phone]  Pager: [phone]  Melissa

## 2024-01-16 NOTE — PROGRESS NOTES
"[facility]  General Neurology - Progress Note    Patient Name:  Shayy Cortez  Date of Service:  January 16, 2024    Subjective:    NAEON. Patient is feeling better today in terms of jerky/stiff/tremulous and anxiety. Had BM, and no urinary retention last night. Less tired compared to yesterday after reducing clonazepam dose.     Objective:    Vitals: BP (!) 160/84 (BP Location: Left arm)   Pulse 88   Temp 98.1  F (36.7  C) (Oral)   Resp 20   Ht 1.575 m (5' 2.01\")   SpO2 96%   BMI 19.02 kg/m    General: Lying in bed, NAD  Head: Atraumatic, normocephalic   Cardiac: No lower extremity edema  Neuro:  Mental status: Awake, alert, attentive, oriented to self, time, place, and circumstance. Language is fluent and coherent with intact comprehension of complex commands, naming and repetition.  Cranial nerves: VFF, PERRL - 2mm, conjugate gaze, EOMI without gaze palsy, occasional square wave jerks, still endorses occasional horizontal diplopia, facial sensation intact, face symmetric, shoulder shrug strong, tongue/uvula midline, no dysarthria. No jaw jerk reflex.  Motor: Normal bulk. Increased tone and spasticity in all extremities L > R hemibody. Low-frequency postural and intention tremor - no longer with obvious wing flapping tremors, no whole body jerks - improved from admission. 5/5 strength bilaterally in deltoids, biceps, triceps, hand , hip flexors, hip extensors, knee flexion, knee extension, plantarflexion, dorsiflexion.   Reflexes: 3+ in biceps, brachioradialis, triceps; 4+ in patellae and achilles. Negative Antony and toes down-going. Sustained Clonus present (8-10+).   Sensory: Intact to light touch, pinprick, vibration, and proprioception.  Coordination: FNF and HS without ataxia. Dysmetric on L FNF but likely in the setting of diplopia.   Gait: Deferred due to weakness and unsteadiness.    Pertinent Investigations:    I have personally reviewed most recent and " pertinent labs, tests, and radiological images.     Assessment & Plan:  Shayy Cortez is a 63 year old female with past medical history of cutaneous melanoma of the abdomen s/p one year treatment of nivolumab with development of progressive tremor, transient vertigo, abnormal eye movements and gait instability despite cessation of Nivolumab that started in August 2023. 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 PERM (progressive encephalomyelitis with rigidity and myoclonus) v.s. antibody negative autoimmune process.    Patient had no difficulties with her gait prior to August. Unclear etiology at this time, however, with highest suspicion for Stiff Person Syndrome. PERM is another consideration as it presents similarly and more often with urinary retention (see below). Parkinsons plus syndromes have been considered given the patient's rigidity, bradykinesia and abnormal eye movements with square wave jerks; however, feel this is less likely given the patient's quickly progressing symptoms with reassuring radiologic findings (no midbrain atrophy or hot cross buns sign). Furthermore, she has had two LP's now (including one this admission) that are suggestive of an inflammatory process with elevated cell counts with lymphocytic predominance, elevated protein, and presence of a couple oligoclonal bands. Given the patient's history of rheumatoid arthritis and rampant family history of autoimmune disease, suspect that an autoimmune process is most likely. Awaiting ESTEPHANIA 65, DPPX, and glycine antibody results. If panel returns negative, could be an autoimmune, antibody negative process. Trialing high dose steroids, IVIG, and benzo's given the severity of her symptoms.     Observing improvement in tone, hyperreflexia, and tremor after start of the IVIG and steroids. Plan for discharge after last dose of steroids 1/15 to ARU. Referral placed for neuroimmunology, likely will need to continue IVIG outpatient  post-rehab.     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 Delcid in [facility]. She initially endorsed bilateral temporal and occipital headaches with severity of 4/10 being described as dull pain. Initially presented with mild confusion, dizziness, vertical diplopia, square wave jerks with movement of the eyes, high frequency tremor in the arms and legs with whole body jerks,  bradykinesia, and hyperreflexia. MRI brain and CTA head and neck unrevealing. In December 22 2023, her Neurologist recommended starting a 5-day course of high-dose solumedrol; however this was not started due to timing issues. Completed 5-day course of solumedrol and IVIG alongside 2 days of clonazepam; overall, significant improvement in ataxia and tremors. Plan to discontinue to ARU with steroid taper.   - IVIG 20g 5/5 dose 1/10/23-1/14/23   - Solumedrol 1000mg 5/5 dose (last on 1/15/23)  - Discharge taper plan: Start at 60mg, down by 10mg for 5 weeks; afterwards, continue PTA prednisone 5 mg daily for rheumatoid arthritis   - Clonazepam 0.5mg AM, 1mg at bedtime, 0.5mg daily PRN  - PT/OT/SLP - recommended ARU; consulted SW to set up ARU placement   - Referral placed for outpatient neuroimmunology    -Defer need for pulse IVIG to outpatient neuroimmunology provider  - Follow-up ESTEPHANIA 65, DPPX and glycine antibodies ([place] movement disorders panel both CSF and serum) - pending  - Supine and standing blood pressures do not meet the criteria orthostatic hypotension. Will repeat tests if patient continues to have symptoms  - Bactrim DS 3 times per week for PJP prophylaxis (1/15-) until high dose steroid treament completed.  -Refer to neurology clinic.    Negative / Normal labs  - Lyme Ig  - Treponema  - CSF VDRL, ACE, meningitis and encephalitis PCR panel,  - TRAN  - ANCA  - Paraneoplastic antibodies              - PCAs              - Neuronoal K son              -  "CRMP              - ANNAs              - Glial nuclear              - Amphiphysin  - MuSK antibodies  - Myasthenia Ab  - Lambert Eaton Ab  - SSB / SSA  - ACE  - Myeloma / MGUS  - GQ1B Ab  - Serum Oligoclonal bands  - Copper   - Homocysteine  - Methylmalonic acid  - Flow cytometry without lymphoma or blast population      Abnormal / Elevated labs  - Cadmium / creatinine 1.6  - CSF 12/14/23 clear and colorless, nucleated cell count 33, Protein 78, lymphocytosis 84, glucose 53, Kappa light chain 0.15, Oligoclonal bands 2  - CSF 1/11/23 clear and colorless, nucleated cell count 32, protein 85.2, lymphocytosis 91, glucose 54    # Moderate Cervical Spinal Canal Stenosis of C3 - C7  Incidentally discovered when MRI C Spine performed given patient's significant/ Hyperreflexia with 4+ reflexes in patellars and sustained clonus. Possibly contributing to patient's hyperreflexia but cannot fully explain her presentation, particularly her abnormal eye movements. Furthermore, she previously had a positive jaw jerk reflex, suggestive of a neurological process affecting her brain. Notably, MRI was without cord signal change; thus do not suspect true myelopathy from her cervical stenosis.  Consider repeat MRI in consult neurosurgery for intervention if symptoms worsen or hyperreflexia does not improve after IVIG/steroid treatment.     #Urinary Retention  Likely secondary to hydroxyzine that was given prn for anxiety or Bendaryl given with IVIG. Some improvement with stopping hydroxyzine however intermittent urinary rentention ~500-600ml still present. Possibly could also be dysautonomia secondary to her autoimmune process (urinary retention may occur more frequently in \"PERM\" compared to stiff person syndrome). Last day of Bendaryl/IVIG today, expect continued improvement in rehab. Reassuring that patient endorses spontaneous ability to urinate x 1 on 1/13/24.  - Continue to hold hydroxyzine  - Monitor for retention in rehab, " consider catheter if continues     # Leukocytosis  In setting of high dose steroids. No infectious symptoms. UA, CRP and procal negative, low concern for UTI. Now downtrending.   - CBC daily    # Anxiety  # Depression   Patient experiencing difficulty sleeping due to anxiety while admitted. PTA Citalopram may be contributing to hyperreflexia.   - Citalopram PTA 40 mg daily  - Stopped Hydroxyzine 25mg prn given concerns for urinary retention     # Constipation  Good relief with stool softeners.   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 Treatment started in November 2022 with last treatment in August 17th 2023, prematurely terminated due to increased neurologic issues as noted. She had two remaining doses of Nivolumab.  - Follows with Oncology Kailey Davsi     # Rheumatoid arthritis  Previously on methotrexate and Etanercept stopped due to immunomodulatory adjuvant therapy.  - Held PTA prednisone 5 mg during steroid burst (see above)     # Hypothyroidism  - PTA Levothyroxine 50 mcg    # Hx of alcohol abuse  - No active interventions    Resolved Conditions:   # Lactic Acidosis, resolved  Lactic acid on admission 4.1 -> 1.5 s/p 2L IVF with normalization. Blood cultures 1/10/23 show NGTD.      DVT Prophylaxis: Lovenox 40mg Q24H  Code Status: Full Code  FEN: Regular diet  Dispo: Medically ready. Acute rehab center placement pending insurance authorization.     Sam Narvaez MD  PGY-1 Neurology Resident     Patient was seen and discussed with Dr. Gilman.

## 2024-01-16 NOTE — PLAN OF CARE
Goal Outcome Evaluation:      Plan of Care Reviewed With: patient    Overall Patient Progress: improving    Pt states she feels more tired today, rested in bed for most of shift. Voiding spontaneously. Denies pain. Possible discharge tomorrow when TCU bed available & insurance is authorized. Continue POC.

## 2024-01-16 NOTE — PROGRESS NOTES
Care Management Follow Up    Length of Stay (days): 7    Expected Discharge Date: 01/16/2024     Concerns to be Addressed: discharge planning     Patient plan of care discussed at interdisciplinary rounds: Yes    Anticipated Discharge Disposition: [facility]     Anticipated Discharge Services: None  Anticipated Discharge DME: None    Patient/family educated on Medicare website which has current facility and service quality ratings: no  Education Provided on the Discharge Plan: Yes  Patient/Family in Agreement with the Plan: yes    Referrals Placed by CM/SW:    Private pay costs discussed: Not applicable    Additional Information:    Patient tentatively scheduled to discharge to ARU today at 3pm. [facility] know [facility] still have not received insurance authorization, and [facility] does not have any beds today.     SW rescheduled  [employer] w/c ride ([phone]) to tomorrow between 2:50-3:35pm. SW updated bedside RN, patient, charge RN and medical team.        JUAN Dias   7B    Phone: [phone]  Pager: [phone]  Melissa

## 2024-01-17 ENCOUNTER — APPOINTMENT (OUTPATIENT)
Dept: OCCUPATIONAL THERAPY | Facility: CLINIC | Age: 64
End: 2024-01-17
Payer: COMMERCIAL

## 2024-01-17 ENCOUNTER — APPOINTMENT (OUTPATIENT)
Dept: PHYSICAL THERAPY | Facility: CLINIC | Age: 64
End: 2024-01-17
Payer: COMMERCIAL

## 2024-01-17 LAB
CREAT SERPL-MCNC: 0.95 MG/DL (ref 0.51–0.95)
EGFRCR SERPLBLD CKD-EPI 2021: 68 ML/MIN/1.73M2
GLUCOSE BLDC GLUCOMTR-MCNC: 101 MG/DL (ref 70–99)
GLUCOSE BLDC GLUCOMTR-MCNC: 144 MG/DL (ref 70–99)
GLUCOSE BLDC GLUCOMTR-MCNC: 181 MG/DL (ref 70–99)
GLUCOSE BLDC GLUCOMTR-MCNC: 224 MG/DL (ref 70–99)
GLUCOSE BLDC GLUCOMTR-MCNC: 78 MG/DL (ref 70–99)
PLATELET # BLD AUTO: 312 10E3/UL (ref 150–450)

## 2024-01-17 PROCEDURE — 82565 ASSAY OF CREATININE: CPT

## 2024-01-17 PROCEDURE — 97530 THERAPEUTIC ACTIVITIES: CPT | Mod: GO | Performed by: OCCUPATIONAL THERAPIST

## 2024-01-17 PROCEDURE — 36415 COLL VENOUS BLD VENIPUNCTURE: CPT

## 2024-01-17 PROCEDURE — 250N000013 HC RX MED GY IP 250 OP 250 PS 637

## 2024-01-17 PROCEDURE — 250N000011 HC RX IP 250 OP 636

## 2024-01-17 PROCEDURE — 85049 AUTOMATED PLATELET COUNT: CPT

## 2024-01-17 PROCEDURE — 97116 GAIT TRAINING THERAPY: CPT | Mod: GP | Performed by: PHYSICAL THERAPIST

## 2024-01-17 PROCEDURE — 97110 THERAPEUTIC EXERCISES: CPT | Mod: GP | Performed by: PHYSICAL THERAPIST

## 2024-01-17 PROCEDURE — 99232 SBSQ HOSP IP/OBS MODERATE 35: CPT | Mod: GC | Performed by: INTERNAL MEDICINE

## 2024-01-17 PROCEDURE — 250N000013 HC RX MED GY IP 250 OP 250 PS 637: Performed by: PSYCHIATRY & NEUROLOGY

## 2024-01-17 PROCEDURE — 120N000002 HC R&B MED SURG/OB UMMC

## 2024-01-17 RX ORDER — HEPARIN SODIUM (PORCINE) LOCK FLUSH IV SOLN 100 UNIT/ML 100 UNIT/ML
5-10 SOLUTION INTRAVENOUS
Status: DISCONTINUED | OUTPATIENT
Start: 2024-01-17 | End: 2024-01-18 | Stop reason: HOSPADM

## 2024-01-17 RX ORDER — CLONAZEPAM 0.5 MG/1
1 TABLET ORAL DAILY
Qty: 30 TABLET | Refills: 0 | Status: ON HOLD | OUTPATIENT
Start: 2024-01-17 | End: 2024-01-25

## 2024-01-17 RX ORDER — HEPARIN SODIUM (PORCINE) LOCK FLUSH IV SOLN 100 UNIT/ML 100 UNIT/ML
100 SOLUTION INTRAVENOUS EVERY 8 HOURS PRN
Status: DISCONTINUED | OUTPATIENT
Start: 2024-01-17 | End: 2024-01-18 | Stop reason: HOSPADM

## 2024-01-17 RX ORDER — PREDNISONE 5 MG/1
5 TABLET ORAL EVERY MORNING
Qty: 30 TABLET | Refills: 3 | Status: ON HOLD | OUTPATIENT
Start: 2024-02-28 | End: 2024-01-22

## 2024-01-17 RX ADMIN — LEVOTHYROXINE SODIUM 50 MCG: 0.05 TABLET ORAL at 08:21

## 2024-01-17 RX ADMIN — CALCIUM 500 MG: 500 TABLET ORAL at 08:21

## 2024-01-17 RX ADMIN — GABAPENTIN 300 MG: 300 CAPSULE ORAL at 22:22

## 2024-01-17 RX ADMIN — CLONAZEPAM 0.5 MG: 0.5 TABLET ORAL at 09:31

## 2024-01-17 RX ADMIN — INSULIN ASPART 1 UNITS: 100 INJECTION, SOLUTION INTRAVENOUS; SUBCUTANEOUS at 17:21

## 2024-01-17 RX ADMIN — ENOXAPARIN SODIUM 40 MG: 40 INJECTION SUBCUTANEOUS at 13:45

## 2024-01-17 RX ADMIN — CITALOPRAM HYDROBROMIDE 20 MG: 20 TABLET ORAL at 08:21

## 2024-01-17 RX ADMIN — CALCIUM 500 MG: 500 TABLET ORAL at 17:21

## 2024-01-17 RX ADMIN — CLONAZEPAM 1 MG: 0.5 TABLET ORAL at 22:22

## 2024-01-17 RX ADMIN — PANTOPRAZOLE SODIUM 40 MG: 40 TABLET, DELAYED RELEASE ORAL at 08:21

## 2024-01-17 RX ADMIN — SENNOSIDES 8.6 MG: 8.6 TABLET, FILM COATED ORAL at 08:21

## 2024-01-17 RX ADMIN — SULFAMETHOXAZOLE AND TRIMETHOPRIM 1 TABLET: 800; 160 TABLET ORAL at 08:21

## 2024-01-17 RX ADMIN — INSULIN ASPART 1 UNITS: 100 INJECTION, SOLUTION INTRAVENOUS; SUBCUTANEOUS at 08:25

## 2024-01-17 RX ADMIN — Medication 50 MCG: at 08:21

## 2024-01-17 ASSESSMENT — ACTIVITIES OF DAILY LIVING (ADL)
ADLS_ACUITY_SCORE: 36

## 2024-01-17 NOTE — PLAN OF CARE
Vital signs:  Temp: 99  F (37.2  C) Temp src: Oral BP: 124/75 Pulse: 106   Resp: 18 SpO2: 97 % O2 Device: None (Room air)     [id number]:    Activity: Up to commode with assist of one and walker.  Neuros: A & Ox4, roving eye movement but patient states better. No tremor noted. Neuro otherwise intact.   Cardiac: Tachy in the low 100s. /75. Asymptomatic.   Respiratory: LS clear. O2 sats high 90s on RA. Denies SOB. Unlabored.   GI/: BS+, passing flatus, patient reported having three small loose BMs. Voiding, not saving.   Diet: Regular diet.   Skin: WDL.  Lines: Left chest port c/d/I.   Incisions/Drains: None.   Labs: Reviewed.  at bedtime,  at 0200.   Pain/nausea: Denies pain. Denies nausea.  New changes this shift: None.   Plan: TCU today.

## 2024-01-17 NOTE — PLAN OF CARE
"Goal Outcome Evaluation:  BP (!) 140/91 (BP Location: Right arm)   Pulse 93   Temp 98  F (36.7  C) (Oral)   Resp 16   Ht 1.575 m (5' 2.01\")   SpO2 96%   BMI 19.02 kg/m      A&Ox4, able to make needs known. VSS on RA. Up with assist x1 and walker to bathroom. Voiding without difficulties, small BM today. Tolerating diet. Denies pain and nausea. Plan to discharge today to acute rehab per social workers note, ride set up around 1500. Port  de-accessed. Call light within reach, continue with plan of care.                    "

## 2024-01-17 NOTE — PROGRESS NOTES
Care Management Follow Up    Length of Stay (days): 8    Expected Discharge Date: 01/17/2024     Concerns to be Addressed: discharge planning     Patient plan of care discussed at interdisciplinary rounds: Yes    Anticipated Discharge Disposition: [facility]     Anticipated Discharge Services: None  Anticipated Discharge DME: None    Patient/family educated on Medicare website which has current facility and service quality ratings: no  Education Provided on the Discharge Plan: Yes  Patient/Family in Agreement with the Plan: yes    Referrals Placed by CM/SW:    Private pay costs discussed: Not applicable    Additional Information:    Patient tentatively scheduled to discharge to [facility] today at 3pm. This SW received a teams message from [facility] liaison letting SW know they still have not received insurance authorization.     SW rescheduled [facility] w/[employer] ride ([phone]) to tomorrow between 2:50-3:35pm.     Updated patient at bedside that she is unable to discharge to TCU due to insurance authorization not coming through yet. Patient understood. SW asked patient if she filled out the HCD SW left at bedside yesterday. Patient still is deciding if she would like to fill it out or not.     SW paged neurology to update them, notified unit charge RN and bedside RN as well.       JUAN Dias LSW   7B    Phone: [phone]  Pager: [phone]  Melissa

## 2024-01-17 NOTE — PROGRESS NOTES
"[facility]  General Neurology - Progress Note    Patient Name:  Shayy Cortez  Date of Service:  January 17, 2024    Subjective:    NAEON. Patient felt no changes in terms of jerky/stiff/tremulous and anxiety. Had BM, and no urinary retention.    Objective:    Vitals: BP (!) 140/91 (BP Location: Right arm)   Pulse 93   Temp 98  F (36.7  C) (Oral)   Resp 16   Ht 1.575 m (5' 2.01\")   SpO2 96%   BMI 19.02 kg/m    General: Lying in bed, NAD  Head: Atraumatic, normocephalic   Cardiac: No lower extremity edema  Neuro:  Mental status: Awake, alert, attentive, oriented to self, time, place, and circumstance. Language is fluent and coherent with intact comprehension of complex commands, naming and repetition.  Cranial nerves: VFF, PERRL - 2mm, conjugate gaze, EOMI without gaze palsy, occasional square wave jerks, still endorses occasional horizontal diplopia, facial sensation intact, face symmetric, shoulder shrug strong, tongue/uvula midline, no dysarthria. No jaw jerk reflex.  Motor: Normal bulk. Increased tone and spasticity in all extremities L > R hemibody. Low-frequency postural and intention tremor - no longer with obvious wing flapping tremors, no whole body jerks - improved from admission. 5/5 strength bilaterally in deltoids, biceps, triceps, hand , hip flexors, hip extensors, knee flexion, knee extension, plantarflexion, dorsiflexion.   Reflexes: 3+ in biceps, brachioradialis, triceps; 4+ in patellae and achilles. Negative Antony and toes down-going. Sustained Clonus present (8-10+).   Sensory: Intact to light touch, pinprick, vibration, and proprioception.  Coordination: FNF and HS without ataxia. Dysmetric on L FNF but likely in the setting of diplopia.   Gait: Deferred due to weakness and unsteadiness.    Pertinent Investigations:    I have personally reviewed most recent and pertinent labs, tests, and radiological images.     Assessment & Plan:  Shayy Cortez " is a 63 year old female with past medical history of cutaneous melanoma of the abdomen s/p one year treatment of nivolumab with development of progressive tremor, transient vertigo, abnormal eye movements and gait instability despite cessation of Nivolumab that started in August 2023. 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 PERM (progressive encephalomyelitis with rigidity and myoclonus) v.s. antibody negative autoimmune process.    Patient had no difficulties with her gait prior to August. Unclear etiology at this time, however, with highest suspicion for Stiff Person Syndrome. PERM is another consideration as it presents similarly and more often with urinary retention (see below). Parkinsons plus syndromes have been considered given the patient's rigidity, bradykinesia and abnormal eye movements with square wave jerks; however, feel this is less likely given the patient's quickly progressing symptoms with reassuring radiologic findings (no midbrain atrophy or hot cross buns sign). Furthermore, she has had two LP's now (including one this admission) that are suggestive of an inflammatory process with elevated cell counts with lymphocytic predominance, elevated protein, and presence of a couple oligoclonal bands. Given the patient's history of rheumatoid arthritis and rampant family history of autoimmune disease, suspect that an autoimmune process is most likely. Awaiting ESTEPHANIA 65, DPPX, and glycine antibody results. If panel returns negative, could be an autoimmune, antibody negative process. Trialing high dose steroids, IVIG, and benzo's given the severity of her symptoms.     Observing improvement in tone, hyperreflexia, and tremor after start of the IVIG and steroids. Plan for discharge after last dose of steroids 1/15 to ARU. Referral placed for neuroimmunology, likely will need to continue IVIG outpatient post-rehab.     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 Delcid in [facility]. She initially endorsed bilateral temporal and occipital headaches with severity of 4/10 being described as dull pain. Initially presented with mild confusion, dizziness, vertical diplopia, square wave jerks with movement of the eyes, high frequency tremor in the arms and legs with whole body jerks,  bradykinesia, and hyperreflexia. MRI brain and CTA head and neck unrevealing. In December 22 2023, her Neurologist recommended starting a 5-day course of high-dose solumedrol; however this was not started due to timing issues. Completed 5-day course of solumedrol and IVIG alongside 2 days of clonazepam; overall, significant improvement in ataxia and tremors. Plan to discontinue to ARU with steroid taper.   - IVIG 20g 5/5 dose 1/10/23-1/14/23   - Solumedrol 1000mg 5/5 dose (last on 1/15/23)  - Discharge taper plan: Start at 60mg, down by 10mg for 5 weeks; afterwards, continue PTA prednisone 5 mg daily for rheumatoid arthritis   - Clonazepam 0.5mg AM, 1mg at bedtime, 0.5mg daily PRN  - PT/OT/SLP - recommended ARU; consulted SW to set up ARU placement   - Referral placed for outpatient neuroimmunology    -Defer need for pulse IVIG to outpatient neuroimmunology provider  - Follow-up ESTEPHANIA 65, DPPX and glycine antibodies ([place] movement disorders panel both CSF and serum) - pending  - Supine and standing blood pressures do not meet the criteria orthostatic hypotension. Will repeat tests if patient continues to have symptoms  - Bactrim DS 3 times per week for PJP prophylaxis (1/15-) until high dose steroid treament completed.  -Refer to neurology clinic.    Negative / Normal labs  - Lyme Ig  - Treponema  - CSF VDRL, ACE, meningitis and encephalitis PCR panel,  - TRAN  - ANCA  - Paraneoplastic antibodies              - PCAs              - Neuronoal K son              - CRMP              - ANNAs              - Glial nuclear              - Amphiphysin  - MuSK  "antibodies  - Myasthenia Ab  - Lambert Eaton Ab  - SSB / SSA  - ACE  - Myeloma / MGUS  - GQ1B Ab  - Serum Oligoclonal bands  - Copper   - Homocysteine  - Methylmalonic acid  - Flow cytometry without lymphoma or blast population      Abnormal / Elevated labs  - Cadmium / creatinine 1.6  - CSF 12/14/23 clear and colorless, nucleated cell count 33, Protein 78, lymphocytosis 84, glucose 53, Kappa light chain 0.15, Oligoclonal bands 2  - CSF 1/11/23 clear and colorless, nucleated cell count 32, protein 85.2, lymphocytosis 91, glucose 54    # Moderate Cervical Spinal Canal Stenosis of C3 - C7  Incidentally discovered when MRI C Spine performed given patient's significant/ Hyperreflexia with 4+ reflexes in patellars and sustained clonus. Possibly contributing to patient's hyperreflexia but cannot fully explain her presentation, particularly her abnormal eye movements. Furthermore, she previously had a positive jaw jerk reflex, suggestive of a neurological process affecting her brain. Notably, MRI was without cord signal change; thus do not suspect true myelopathy from her cervical stenosis.  Consider repeat MRI in consult neurosurgery for intervention if symptoms worsen or hyperreflexia does not improve after IVIG/steroid treatment.     #Urinary Retention  Likely secondary to hydroxyzine that was given prn for anxiety or Bendaryl given with IVIG. Some improvement with stopping hydroxyzine however intermittent urinary rentention ~500-600ml still present. Possibly could also be dysautonomia secondary to her autoimmune process (urinary retention may occur more frequently in \"PERM\" compared to stiff person syndrome). Last day of Bendaryl/IVIG today, expect continued improvement in rehab. Reassuring that patient endorses spontaneous ability to urinate x 1 on 1/13/24.  - Continue to hold hydroxyzine  - Monitor for retention in rehab, consider catheter if continues     # Leukocytosis  In setting of high dose steroids. No " infectious symptoms. UA, CRP and procal negative, low concern for UTI. Now downtrending.   - CBC daily    # Anxiety  # Depression   Patient experiencing difficulty sleeping due to anxiety while admitted. PTA Citalopram may be contributing to hyperreflexia.   - Citalopram PTA 40 mg daily  - Stopped Hydroxyzine 25mg prn given concerns for urinary retention     # Constipation  Good relief with stool softeners.   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 Treatment started in November 2022 with last treatment in August 17th 2023, prematurely terminated due to increased neurologic issues as noted. She had two remaining doses of Nivolumab.  - Follows with Oncology Kailey Davis     # Rheumatoid arthritis  Previously on methotrexate and Etanercept stopped due to immunomodulatory adjuvant therapy.  - Held PTA prednisone 5 mg during steroid burst (see above)     # Hypothyroidism  - PTA Levothyroxine 50 mcg    # Hx of alcohol abuse  - No active interventions    Resolved Conditions:   # Lactic Acidosis, resolved  Lactic acid on admission 4.1 -> 1.5 s/p 2L IVF with normalization. Blood cultures 1/10/23 show NGTD.      DVT Prophylaxis: Lovenox 40mg Q24H  Code Status: Full Code  FEN: Regular diet  Dispo: Medically ready. Acute rehab center placement pending insurance authorization.     Sam Narvaez MD  PGY-1 Neurology Resident     Patient was seen and discussed with Dr. Gilman.

## 2024-01-18 ENCOUNTER — HOSPITAL ENCOUNTER (INPATIENT)
Facility: CLINIC | Age: 64
LOS: 8 days | Discharge: HOME | End: 2024-01-26
Attending: PHYSICAL MEDICINE & REHABILITATION | Admitting: PHYSICAL MEDICINE & REHABILITATION
Payer: COMMERCIAL

## 2024-01-18 VITALS
HEART RATE: 93 BPM | SYSTOLIC BLOOD PRESSURE: 133 MMHG | HEIGHT: 62 IN | TEMPERATURE: 98 F | OXYGEN SATURATION: 97 % | DIASTOLIC BLOOD PRESSURE: 82 MMHG | BODY MASS INDEX: 19.02 KG/M2 | RESPIRATION RATE: 16 BRPM

## 2024-01-18 DIAGNOSIS — Z29.89 OSTEOPOROSIS PROPHYLAXIS: ICD-10-CM

## 2024-01-18 DIAGNOSIS — G25.82 STIFF-MAN SYNDROME: Primary | ICD-10-CM

## 2024-01-18 DIAGNOSIS — E03.9 HYPOTHYROIDISM, UNSPECIFIED TYPE: ICD-10-CM

## 2024-01-18 DIAGNOSIS — Z29.89 NEED FOR PNEUMOCYSTIS PROPHYLAXIS: ICD-10-CM

## 2024-01-18 DIAGNOSIS — M62.89 MUSCLE STIFFNESS: ICD-10-CM

## 2024-01-18 DIAGNOSIS — F41.9 ANXIETY: ICD-10-CM

## 2024-01-18 LAB
CREAT SERPL-MCNC: 0.94 MG/DL (ref 0.51–0.95)
EGFRCR SERPLBLD CKD-EPI 2021: 69 ML/MIN/1.73M2
GLUCOSE BLDC GLUCOMTR-MCNC: 146 MG/DL (ref 70–99)
GLUCOSE BLDC GLUCOMTR-MCNC: 207 MG/DL (ref 70–99)
GLUCOSE BLDC GLUCOMTR-MCNC: 269 MG/DL (ref 70–99)
GLUCOSE BLDC GLUCOMTR-MCNC: 96 MG/DL (ref 70–99)
GLUCOSE BLDC GLUCOMTR-MCNC: 99 MG/DL (ref 70–99)

## 2024-01-18 PROCEDURE — 128N000003 HC R&B REHAB

## 2024-01-18 PROCEDURE — 250N000013 HC RX MED GY IP 250 OP 250 PS 637

## 2024-01-18 PROCEDURE — 250N000011 HC RX IP 250 OP 636

## 2024-01-18 PROCEDURE — 99222 1ST HOSP IP/OBS MODERATE 55: CPT | Mod: AI | Performed by: PHYSICAL MEDICINE & REHABILITATION

## 2024-01-18 PROCEDURE — 36415 COLL VENOUS BLD VENIPUNCTURE: CPT

## 2024-01-18 PROCEDURE — 250N000012 HC RX MED GY IP 250 OP 636 PS 637

## 2024-01-18 PROCEDURE — 82565 ASSAY OF CREATININE: CPT

## 2024-01-18 PROCEDURE — 99238 HOSP IP/OBS DSCHRG MGMT 30/<: CPT | Mod: GC | Performed by: INTERNAL MEDICINE

## 2024-01-18 PROCEDURE — 250N000013 HC RX MED GY IP 250 OP 250 PS 637: Performed by: PSYCHIATRY & NEUROLOGY

## 2024-01-18 RX ORDER — PANTOPRAZOLE SODIUM 40 MG/1
40 TABLET, DELAYED RELEASE ORAL
Status: DISCONTINUED | OUTPATIENT
Start: 2024-01-19 | End: 2024-01-26 | Stop reason: HOSPADM

## 2024-01-18 RX ORDER — CITALOPRAM HYDROBROMIDE 20 MG/1
20 TABLET ORAL DAILY
Status: DISCONTINUED | OUTPATIENT
Start: 2024-01-19 | End: 2024-01-26 | Stop reason: HOSPADM

## 2024-01-18 RX ORDER — CLONAZEPAM 0.5 MG/1
0.5 TABLET ORAL DAILY
Status: DISCONTINUED | OUTPATIENT
Start: 2024-01-19 | End: 2024-01-26 | Stop reason: HOSPADM

## 2024-01-18 RX ORDER — LEVOTHYROXINE SODIUM 50 UG/1
50 TABLET ORAL DAILY
Status: DISCONTINUED | OUTPATIENT
Start: 2024-01-19 | End: 2024-01-26 | Stop reason: HOSPADM

## 2024-01-18 RX ORDER — SULFAMETHOXAZOLE/TRIMETHOPRIM 800-160 MG
1 TABLET ORAL
Status: DISCONTINUED | OUTPATIENT
Start: 2024-01-19 | End: 2024-01-18

## 2024-01-18 RX ORDER — PREDNISONE 20 MG/1
20 TABLET ORAL DAILY
Status: DISCONTINUED | OUTPATIENT
Start: 2024-02-15 | End: 2024-01-26 | Stop reason: HOSPADM

## 2024-01-18 RX ORDER — CLONAZEPAM 0.5 MG/1
1 TABLET ORAL AT BEDTIME
Status: DISCONTINUED | OUTPATIENT
Start: 2024-01-18 | End: 2024-01-26 | Stop reason: HOSPADM

## 2024-01-18 RX ORDER — PREDNISONE 5 MG/1
5 TABLET ORAL EVERY MORNING
Status: DISCONTINUED | OUTPATIENT
Start: 2024-02-29 | End: 2024-01-26 | Stop reason: HOSPADM

## 2024-01-18 RX ORDER — POTASSIUM CHLORIDE 750 MG/1
10 CAPSULE, EXTENDED RELEASE ORAL 2 TIMES DAILY
Status: DISCONTINUED | OUTPATIENT
Start: 2024-01-18 | End: 2024-01-26 | Stop reason: HOSPADM

## 2024-01-18 RX ORDER — PREDNISONE 20 MG/1
60 TABLET ORAL DAILY
Status: DISCONTINUED | OUTPATIENT
Start: 2024-01-18 | End: 2024-01-18 | Stop reason: HOSPADM

## 2024-01-18 RX ORDER — LIDOCAINE 4 G/G
2 PATCH TOPICAL DAILY PRN
Status: DISCONTINUED | OUTPATIENT
Start: 2024-01-18 | End: 2024-01-26 | Stop reason: HOSPADM

## 2024-01-18 RX ORDER — FOLIC ACID 1 MG/1
1000 TABLET ORAL DAILY
Status: DISCONTINUED | OUTPATIENT
Start: 2024-01-19 | End: 2024-01-26 | Stop reason: HOSPADM

## 2024-01-18 RX ORDER — CLONAZEPAM 0.5 MG/1
0.5 TABLET ORAL DAILY PRN
Status: DISCONTINUED | OUTPATIENT
Start: 2024-01-18 | End: 2024-01-26 | Stop reason: HOSPADM

## 2024-01-18 RX ORDER — CALCIUM CARBONATE 500(1250)
500 TABLET ORAL 2 TIMES DAILY WITH MEALS
Status: DISCONTINUED | OUTPATIENT
Start: 2024-01-18 | End: 2024-01-26 | Stop reason: HOSPADM

## 2024-01-18 RX ORDER — MECLIZINE HYDROCHLORIDE 25 MG/1
25 TABLET ORAL 2 TIMES DAILY PRN
Status: DISCONTINUED | OUTPATIENT
Start: 2024-01-18 | End: 2024-01-26 | Stop reason: HOSPADM

## 2024-01-18 RX ORDER — PREDNISONE 20 MG/1
60 TABLET ORAL DAILY
Status: COMPLETED | OUTPATIENT
Start: 2024-01-19 | End: 2024-01-24

## 2024-01-18 RX ORDER — NICOTINE POLACRILEX 4 MG
15-30 LOZENGE BUCCAL
Status: DISCONTINUED | OUTPATIENT
Start: 2024-01-18 | End: 2024-01-26 | Stop reason: HOSPADM

## 2024-01-18 RX ORDER — ACETAMINOPHEN 325 MG/1
650 TABLET ORAL EVERY 4 HOURS PRN
Status: DISCONTINUED | OUTPATIENT
Start: 2024-01-18 | End: 2024-01-26 | Stop reason: HOSPADM

## 2024-01-18 RX ORDER — ACETAMINOPHEN 325 MG/1
650 TABLET ORAL EVERY 6 HOURS PRN
Status: DISCONTINUED | OUTPATIENT
Start: 2024-01-18 | End: 2024-01-18

## 2024-01-18 RX ORDER — PREDNISONE 10 MG/1
10 TABLET ORAL DAILY
Status: DISCONTINUED | OUTPATIENT
Start: 2024-02-22 | End: 2024-01-26 | Stop reason: HOSPADM

## 2024-01-18 RX ORDER — CLONAZEPAM 0.5 MG/1
1 TABLET ORAL DAILY
Status: DISCONTINUED | OUTPATIENT
Start: 2024-01-19 | End: 2024-01-18

## 2024-01-18 RX ORDER — METOCLOPRAMIDE 5 MG/1
10 TABLET ORAL EVERY 6 HOURS PRN
Status: DISCONTINUED | OUTPATIENT
Start: 2024-01-18 | End: 2024-01-26 | Stop reason: HOSPADM

## 2024-01-18 RX ORDER — POLYETHYLENE GLYCOL 3350 17 G/17G
17 POWDER, FOR SOLUTION ORAL 2 TIMES DAILY
Status: DISCONTINUED | OUTPATIENT
Start: 2024-01-18 | End: 2024-01-26 | Stop reason: HOSPADM

## 2024-01-18 RX ORDER — DEXTROSE MONOHYDRATE 25 G/50ML
25-50 INJECTION, SOLUTION INTRAVENOUS
Status: DISCONTINUED | OUTPATIENT
Start: 2024-01-18 | End: 2024-01-26 | Stop reason: HOSPADM

## 2024-01-18 RX ORDER — ENOXAPARIN SODIUM 100 MG/ML
40 INJECTION SUBCUTANEOUS EVERY 24 HOURS
Status: DISCONTINUED | OUTPATIENT
Start: 2024-01-18 | End: 2024-01-26 | Stop reason: HOSPADM

## 2024-01-18 RX ORDER — VITAMIN B COMPLEX
50 TABLET ORAL DAILY
Status: DISCONTINUED | OUTPATIENT
Start: 2024-01-19 | End: 2024-01-26 | Stop reason: HOSPADM

## 2024-01-18 RX ORDER — SULFAMETHOXAZOLE/TRIMETHOPRIM 800-160 MG
1 TABLET ORAL
Status: DISCONTINUED | OUTPATIENT
Start: 2024-01-19 | End: 2024-01-26 | Stop reason: HOSPADM

## 2024-01-18 RX ORDER — SENNOSIDES 8.6 MG
1 TABLET ORAL 2 TIMES DAILY
Status: DISCONTINUED | OUTPATIENT
Start: 2024-01-18 | End: 2024-01-26 | Stop reason: HOSPADM

## 2024-01-18 RX ORDER — PREDNISONE 20 MG/1
40 TABLET ORAL DAILY
Status: DISCONTINUED | OUTPATIENT
Start: 2024-02-01 | End: 2024-01-26 | Stop reason: HOSPADM

## 2024-01-18 RX ADMIN — CLONAZEPAM 0.5 MG: 0.5 TABLET ORAL at 10:00

## 2024-01-18 RX ADMIN — CLONAZEPAM 1 MG: 0.5 TABLET ORAL at 21:13

## 2024-01-18 RX ADMIN — PREDNISONE 60 MG: 20 TABLET ORAL at 09:59

## 2024-01-18 RX ADMIN — INSULIN ASPART 1 UNITS: 100 INJECTION, SOLUTION INTRAVENOUS; SUBCUTANEOUS at 12:34

## 2024-01-18 RX ADMIN — CALCIUM 500 MG: 500 TABLET ORAL at 17:04

## 2024-01-18 RX ADMIN — LEVOTHYROXINE SODIUM 50 MCG: 0.05 TABLET ORAL at 07:59

## 2024-01-18 RX ADMIN — Medication 50 MCG: at 07:59

## 2024-01-18 RX ADMIN — ENOXAPARIN SODIUM 40 MG: 40 INJECTION SUBCUTANEOUS at 17:04

## 2024-01-18 RX ADMIN — CITALOPRAM HYDROBROMIDE 20 MG: 20 TABLET ORAL at 07:59

## 2024-01-18 RX ADMIN — CALCIUM 500 MG: 500 TABLET ORAL at 07:59

## 2024-01-18 RX ADMIN — POTASSIUM CHLORIDE 10 MEQ: 750 CAPSULE, EXTENDED RELEASE ORAL at 20:16

## 2024-01-18 RX ADMIN — PANTOPRAZOLE SODIUM 40 MG: 40 TABLET, DELAYED RELEASE ORAL at 07:59

## 2024-01-18 RX ADMIN — ACETAMINOPHEN 650 MG: 325 TABLET, FILM COATED ORAL at 08:03

## 2024-01-18 ASSESSMENT — ACTIVITIES OF DAILY LIVING (ADL)
HEARING_DIFFICULTY_OR_DEAF: NO
ADLS_ACUITY_SCORE: 36
CHANGE_IN_FUNCTIONAL_STATUS_SINCE_ONSET_OF_CURRENT_ILLNESS/INJURY: YES
ADLS_ACUITY_SCORE: 36
DRESSING/BATHING_DIFFICULTY: YES
ADLS_ACUITY_SCORE: 29
ADLS_ACUITY_SCORE: 36
WALKING_OR_CLIMBING_STAIRS_DIFFICULTY: YES
DRESSING/BATHING: DRESSING DIFFICULTY, ASSISTANCE 1 PERSON
ADLS_ACUITY_SCORE: 31
ADLS_ACUITY_SCORE: 29
TOILETING_ISSUES: NO
DOING_ERRANDS_INDEPENDENTLY_DIFFICULTY: YES
ADLS_ACUITY_SCORE: 33
WALKING_OR_CLIMBING_STAIRS: AMBULATION DIFFICULTY, ASSISTANCE 1 PERSON
VISION_MANAGEMENT: READERS
ADLS_ACUITY_SCORE: 36
DIFFICULTY_COMMUNICATING: NO
WEAR_GLASSES_OR_BLIND: YES
EQUIPMENT_CURRENTLY_USED_AT_HOME: WALKER, ROLLING
ADLS_ACUITY_SCORE: 33
CONCENTRATING,_REMEMBERING_OR_MAKING_DECISIONS_DIFFICULTY: OTHER (SEE COMMENTS)
ADLS_ACUITY_SCORE: 36
DRESSING/BATHING_MANAGEMENT: SET UP ASSIST
ADLS_ACUITY_SCORE: 36
ADLS_ACUITY_SCORE: 36
DIFFICULTY_EATING/SWALLOWING: NO
FALL_HISTORY_WITHIN_LAST_SIX_MONTHS: NO

## 2024-01-18 NOTE — H&P
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 Pt completed 5 day course of solumedrol and IVIG w/ improvement in tone, hyperreflexia, and tremor noted.  In addition to managing her neurologic status, she also has required medical mgmt of her urinary retention which is improving, constipation improving, blood sugars management due to high dose steroids and long term taper, pain, and mental health     During his acute hospitalization, patient was seen and evaluated by  PT, OT and SLP consult service.  All specialties collectively recommended that patient would benefit from ongoing therapies in the acute inpatient rehabilitation setting.      In review of the therapy notes,     Level of Function: GG Scale (Section GG Functional Ability and Goals; CMS's LINK Version 3.0 Manual effective 10.1.2019):  PT Current Function Goals for Rehab   Bed Rolling 4 Supervision or touching assitance 6 Independent   Supine to Sit 4 Supervision or touching assitance 6 Independent   Sit to Stand 4 Supervision or touching assitance 6 Independent   Transfer 4 Supervision or touching assitance 6 Independent   Ambulation 1 Dependent 6 Independent   Stairs 88 Not attempted due to safety 4 Supervision or touching assitance      OT Current Function Goals for Rehab   Feeding 6 Independent 6 Independent   Grooming 4 Supervision or touching assitance 6 Independent   Bathing Not completed 6 Independent   Upper Body Dressing 3 Partial/moderate assistance 6 Independent  "  Lower Body Dressing 3 Partial/moderate assistance 6 Independent   Toileting 2 Substantial/maximal assistance 6 Independent   Toilet Transfer 3 Partial/moderate assistance 6 Independent   Tub/Shower Transfer Not completed 6 Independent   Cognition Not Impaired Independent      SLP Current Function Goals for Rehab   Swallow Not Impaired Not applicable   Communication Not Impaired Not applicable       Upon admission to the ARU:  Patient was seen and examined resting comfortably in bed. We discussed logistics of the ARU and what to expect with therapies during her stay. All questions were answered. Patient reports that she will intermittently get headaches which have been well controlled with tylenol. Her headaches have drastically improved since prior to hospitalization. She also reports \"lightheadedness/ fogginess\". She reports it is constant and does not improve or worsen with activity or positional change. She denies any vertigo and reports that she previously got it prior to hospitalization but has not had it since steroid treatments. She also reports intermittent back pain which she correlates with being in a bed for most of the day.     Currently, the patient is medically appropriate and is assessed to have needs and will benefit from an inpatient acute rehabilitation comprehensive program working with PT, and OT, and will also benefit from supervision and management of Rehab Nursing and Rehab Physician.        PAST MEDICAL HISTORY  No past medical history on file.    SURGICAL HISTORY  No past surgical history on file.    SOCIAL HISTORY  Marital Status: Has significant other  Living situation: Pt lives in mobile home with partner, Jorge. Tub shower. Does not have support while Jorge is at work   Family support: Does not have support while Jorge is at work  Tobacco use: Former smoker, quit in 2011  Alcohol use: Denies  Illicit drug use: Denies  Social History     Socioeconomic History    Marital status: Single "     Spouse name: Not on file    Number of children: Not on file    Years of education: Not on file    Highest education level: Not on file   Occupational History    Not on file   Tobacco Use    Smoking status: Not on file    Smokeless tobacco: Not on file   Substance and Sexual Activity    Alcohol use: Not on file    Drug use: Not on file    Sexual activity: Not on file   Other Topics Concern    Not on file   Social History Narrative    Preloaded 3/19/13     Social Determinants of Health     Financial Resource Strain: Not on file   Food Insecurity: Not on file   Transportation Needs: Not on file   Physical Activity: Not on file   Stress: Not on file   Social Connections: Not on file   Interpersonal Safety: Not on file   Housing Stability: Not on file       FAMILY HISTORY  Multiple females in her family with autoimmune diseases.       PRIOR FUNCTIONAL HISTORY   Pt was independent with all ADLs/IADLs, transfers, mobility and gait.      MEDICATIONS  Medications Prior to Admission   Medication Sig Dispense Refill Last Dose    acetaminophen (TYLENOL) 500 MG tablet Take 500-1,000 mg by mouth every 6 hours as needed for mild pain   1/18/2024    calcium carbonate 500 mg, elemental, (OSCAL) 500 MG tablet Take 1 tablet (500 mg) by mouth 2 times daily (with meals) 120 tablet 0 1/18/2024    cholecalciferol 50 MCG (2000 UT) CAPS Take 50 mcg by mouth daily   1/18/2024    citalopram (CELEXA) 20 MG tablet Take 1 tablet (20 mg) by mouth daily for 90 days 90 tablet 0 1/18/2024    clonazePAM (KLONOPIN) 0.5 MG tablet Take 2 tablets (1 mg) by mouth daily 30 tablet 0 1/18/2024    clonazePAM (KLONOPIN) 1 MG tablet Take 1 tablet (1 mg) by mouth at bedtime 60 tablet 0 1/17/2024    folic acid (FOLVITE) 1 MG tablet Take 1 tablet by mouth daily   Unknown    levothyroxine (SYNTHROID/LEVOTHROID) 50 MCG tablet Take 1 tablet by mouth daily   1/18/2024    meclizine (ANTIVERT) 25 MG tablet Take 25 mg by mouth 2 times daily as needed for dizziness    "Unknown    metoclopramide (REGLAN) 10 MG tablet Take 10 mg by mouth every 6 hours as needed (nausea)   Unknown    omeprazole (PRILOSEC) 20 MG DR capsule Take 20 mg by mouth every morning (before breakfast)   Unknown    ondansetron (ZOFRAN ODT) 4 MG ODT tab Place 8 mg under the tongue every 8 hours as needed for vomiting or nausea   Unknown    potassium chloride ER (K-TAB/KLOR-CON) 10 MEQ CR tablet Take 10 mEq by mouth 2 times daily   Unknown    predniSONE (DELTASONE) 10 MG tablet Take 6 tablets (60 mg) by mouth daily for 7 days 42 tablet 0 Unknown    [START ON 1/24/2024] predniSONE (DELTASONE) 10 MG tablet Take 5 tablets (50 mg) by mouth daily for 6 days 30 tablet 0 Unknown    [START ON 1/31/2024] predniSONE (DELTASONE) 10 MG tablet Take 4 tablets (40 mg) by mouth daily for 6 days 24 tablet 0 Unknown    [START ON 2/7/2024] predniSONE (DELTASONE) 10 MG tablet Take 3 tablets (30 mg) by mouth daily for 6 days 18 tablet 0 Unknown    [START ON 2/14/2024] predniSONE (DELTASONE) 10 MG tablet Take 2 tablets (20 mg) by mouth daily for 6 days 12 tablet 0 Unknown    [START ON 2/21/2024] predniSONE (DELTASONE) 10 MG tablet Take 1 tablet (10 mg) by mouth daily for 6 days 6 tablet 0 Unknown    [START ON 2/28/2024] predniSONE (DELTASONE) 5 MG tablet Take 1 tablet (5 mg) by mouth every morning 30 tablet 3 Past Month    sulfamethoxazole-trimethoprim (BACTRIM DS) 800-160 MG tablet Take 1 tablet by mouth Every Mon, Wed, Fri Morning 15 tablet 0 1/17/2024       ALLERGIES   No Known Allergies      REVIEW OF SYSTEMS  A 10 point ROS was performed and negative unless otherwise noted in HPI.           PHYSICAL EXAM  VITAL SIGNS:  /72 (BP Location: Left arm)   Pulse 103   Temp 98.1  F (36.7  C) (Oral)   Resp 16   SpO2 98%   BMI:  Estimated body mass index is 19.02 kg/m  as calculated from the following:    Height as of 1/10/24: 1.575 m (5' 2.01\").    Weight as of 1/9/24: 47.2 kg (104 lb).     General: NAD, pleasant and cooperative " "  HEENT: ATNC, oropharynx clear with MMM, EOMI, PERRL    Pulmonary: clear breath sounds b/l, no rales/wheezing    Cardiovascular: RRR, no murmur   Abdominal: soft, non-tender, non-distended, normoactive bowel sounds bilaterally  Extremities: warm, well perfused, no edema in bilateral lower extremities, no tenderness in calves   MSK/neuro:   Mental Status:  alert and oriented x3    Cranial Nerves: grossly normal and symmetrical bilaterally   Sensory: Normal and symmetrical to light touch in bilateral upper and lower extremities    Strength:     SF  EF  EE  WE  G  I  HF  KE  DF  EHL  PF   R  4/5 4/5 4/5 4-/5 3/5 3+/5 4/5 4/5 4/5 4/5 4/5   L  4/5 4/5 4/5 3+/5 3/5 3+/5 4/5 4/5 4/5 4/5 4/5    Reflexes: Present and symmetrical in bilateral upper and lower extremities; sustained clonus b/l    Antony's test: Positive bilaterally    Babinski reflex: downgoing bilaterally    Tone per modified Rikki Scale: 0/4 throughout   Abnormal movements: No tremors appreciated. Negative pronator drift   Coordination: No dysmetria on finger to nose b/l    Speech: Coherent and comprehensible.        Skin: Chemo port in Left upper chest. C/D/I      LABS  Pertinent labs     Lab Results   Component Value Date    WBC 12.3 (H) 01/15/2024    HGB 10.0 (L) 01/15/2024    HCT 29.9 (L) 01/15/2024    MCV 91 01/15/2024     01/17/2024     Lab Results   Component Value Date     01/15/2024    POTASSIUM 3.7 01/15/2024    CHLORIDE 106 01/15/2024    CO2 21 (L) 01/15/2024     (H) 01/18/2024     Lab Results   Component Value Date    GFRESTIMATED 69 01/18/2024     Lab Results   Component Value Date    AST 14 01/09/2024    ALT 13 01/09/2024    ALKPHOS 49 01/09/2024    BILITOTAL 0.3 01/09/2024     No results found for: \"INR\"  Lab Results   Component Value Date    BUN 21.3 01/15/2024    CR 0.94 01/18/2024         ASSESSMENT/PLAN:  Shayy Cortez is a 63 year old female with PMH of cutaneous melanoma of the abdomen s/p one year treatment of " nivolumab with development of progressive tremor, transient vertigo, abnormal eye movements and gait instability despite cessation of Nivolumab that started in August 2023. 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 Pt completed 5 day course of solumedrol and IVIG w/ improvement in tone, hyperreflexia, and tremor noted.  In addition to managing her neurologic status, she also has required medical mgmt of her urinary retention which is improving, constipation improving, blood sugars management due to high dose steroids and long term taper, pain, and mental health      Admission to acute inpatient rehab 01/18.    Impairment group code: Neurologic Conditions 03.9 Other Neurologic: Stiff-Person Syndrome       PT, OT and SLP 90 minutes of each 6 days per week, in addition to rehab nursing and close management of physiatrist.      Impairment of ADL's:  OT for 90 minutes 6 days per week to work on ADL re-training such as grooming, self cares and bathing.      Impairment of mobility:  PT for 90 minutes 6 days per week to work on neuromuscular re-education focusing on strength, balance, coordination, and endurance.        Rehab RN for med administration, bowel regimen, glucose monitoring and wound care.       Medical Conditions  #Concern for stiff person syndrome vs PERM vs antibody negative autoimmune process.   #Spastic gait   #Full body postural tremors   #Square wave jerks  She has a primary neurologist, Dr. Delcid in [facility]. She developed symptoms in August of headaches, dizziness, uncontrolled tremors and mild confusion. MRI brain and CTA head and neck unrevealing.During this hospitalization, patient completed IVIG 20g 5/5 dose 1/10/23-1/14/23 and Solumedrol 1000mg 5/5 dose (last on 1/15/23) alongside clonazepam   -PT and OT  -Continue Clonazepam 0.5mg in AM, 1mg at bedtime and 0.5mg daily PRN.   -Continue with prednisone taper   --60mg for seven total doses (ending 1/24). Then  decrease by 10mg every 7 days (ending 2/28). Will then continue PTA chronic 5mg prednisone daily.   -Continue prophylactiv bactrim for 35 total days (started 1/15) while on high dose steroids  -Vit D, calcium supplementation and protonix for 35 days during steroid taper.   -Referral placed for outpatient neuroimmunology for potential initiation of pulse IVIG.   -Follow results for ESTEPHANIA 65, DPPX antibodies.     #Rheumatoid Arthritis  -Steroid taper and treatment as directed above.     #Pain  -Tyelonol and lidocaine patches as needed.  -Can consider baclofen if muscle spasticity or rigidity develop and pain increases. Will assess while at ARU    #Steroid induced hyperglycemia   Have been requiring 2-3 units per day inconsistently.   -Continue QID BG check and sliding scale insulin for now  -Consider to change to bid check if stable     #Leukocytosis   #Anemia  Leukocytosis due to steroid and not concerning at this point   Anemia seems chronic but stable.   -Will monitor clinically and check weekly     # Moderate Cervical Spinal Canal Stenosis of C3 - C7  Incidental finding on C-Spine MRI. MRI without cord signal change so less likely myelopathy from cervical stenosis. May consider repeat MRI and neurosurgery referral in future if symptoms don't improve with steroid treatment.     # Anxiety  # Depression  - Continue with 20mg Citalopram. Previously on 40mg PTA. Consider increasing to PTA dosing during ARU stay.  -Patient denied desire at this time for clinical psychology at this time while at the ARU.     #Hypothyroidism  -Continue PTA levothyroxine     #Bladder  #Urinary Retention  Patient had urinary retention requiring keita catheter in acute setting.   -Patent voiding on her own volition without any complications.   -PVR X2 upon admission. Bladder protocol in place.     #Bowel  Patient had issues of constipation in acute setting.   -Will schedule miralax and Senokot BID.         Adjustment to disability:  Patient  denied desire at this time for clinical psychology at this time while at the ARU.   FEN: Regular diet with thin liquids  DVT Prophylaxis: Lovenox until ambulating community distance  GI Prophylaxis: Protonix while on steroid taper  Code: Full, confirmed on admission  Disposition: Home  ELOS:  8 days.  Rehab prognosis:  Good  Follow up Appointments on Discharge:   Neuroimmunology  Neurology  PCP  PM&R    Seen and discussed with Dr. Landaverde, PM&R staff physician     Trevon Marr DO  PGY2  Physical Medicine and Rehabilitation-[facility]  Pager: [phone]          Physician Attestation   I saw this patient with the resident and agree with the resident/fellow's findings and plan of care as documented in the note.      Abdi findings:   Shayy was overall doing ok. Her symptoms have mostly improved and she was very pleased with the treatment. Denied any paresthesia or urinary symptoms. Tremors and stiffness have improved significantly.  Reviewed the plan as outlined above.     She was I prior to admission; anticipate improvement to mod I level with basic mobility and ADLs. Might need more assessment of her cognition and possibly supervision with all iADLs.   Will benefit from intensive rehabilitation including 90 minutes each of PT and OT, rehabilitation nursing and close management by physiatry. Good medical and rehab prognosis. ELOS is 8 days.         Elizabeth Landaverde MD  Date of Service (when I saw the patient): 01/18/24

## 2024-01-18 NOTE — PLAN OF CARE
"Vital signs:  Temp: 98.2  F (36.8  C) Temp src: Oral BP: 137/74 Pulse: 109   Resp: 16 SpO2: 98 % O2 Device: None (Room air)   Height: 157.5 cm (5' 2.01\") (per Care Everywhere 8/2023)      7193-0155:    Activity: Up to commode with assist of one and walker.  Neuros: A & Ox4, slight roving eye movement but patient states better. No tremor noted. Neuro otherwise intact.   Cardiac: Tachy in the low 100s. /74. Asymptomatic.   Respiratory: LS clear. O2 sats high 90s on RA. Denies SOB. Unlabored.   GI/: BS+, passing flatus, patient reported having one BM yesterday. Voiding, not saving.   Diet: Regular diet.   Skin: WDL.  Lines: Left chest port c/d/I.   Incisions/Drains: None.   Labs: Reviewed. BG 99 at 0200.  Pain/nausea: Denies pain. Denies nausea.  New changes this shift: None.   Plan: TCU today.  "

## 2024-01-18 NOTE — PROGRESS NOTES
Transfer  Transferred to:ARU  Via:Wheelchair with [employer].  Reason for transfer: Pt inappropriate for 7B.  Family: Aware of transfer  Belongings: Sent with pt  Chart: Sent with pt  Medications: Meds from bin sent with pt  Report called to: RN at 1145, pt left 7B at 1240.

## 2024-01-18 NOTE — PROGRESS NOTES
Care Management Discharge Note    Discharge Date: 01/17/2024       Discharge Disposition: [facility]    Discharge Services: None    Discharge DME: None    Discharge Transportation: [employer] w/c ride scheduled between 1140-1220pm today.    Private pay costs discussed: Not applicable    Does the patient's insurance plan have a 3 day qualifying hospital stay waiver?  No    PAS Confirmation Code:  not needed for acute rehab  Patient/family educated on Medicare website which has current facility and service quality ratings: no    Education Provided on the Discharge Plan: Yes  Persons Notified of Discharge Plans: patient, bedside RN, charge RN, medical team, receiving facility  Patient/Family in Agreement with the Plan: yes    Handoff Referral Completed: Yes    Additional Information:    [facility] for the patients stay at acute rehab.       JUAN Dias   7B    Phone: [phone]  Pager: [phone]  Melissa

## 2024-01-18 NOTE — DISCHARGE INSTRUCTIONS
Neurology  You are scheduled to see Neurology on 06/14/2024 at 8:00AM.    Address [address]  Phone  [phone]    PCP  You are scheduled to see Dr. Valente on 02/06/2024 at 11:00AM.    Address [address] [facility]      Phone  [phone]  Fax  [phone]    PM&R  You are scheduled to see Dr. Pemberton on 03/25/2024 at 8:00AM.    Address [address] [place] [address]      Phone  [phone]            Sliding scale insulin:    3 times daily before meals:  For Pre-Meal  - 239 give 1 unit.   For Pre-Meal  - 339 give 2 units.   For Pre-Meal BG greater than or equal to 340 give 3 units.   To be given with prandial insulin, and based on pre-meal blood glucose. Administering insulin within 5 minutes of the start of the meal is idea     At Bedtime:  Do Not give Bedtime Correction Insulin if BG less than 200.   For  - 299 give 1 unit.   For  - 399 give 2 units   For BG greater than or equal 400 give 3 units.

## 2024-01-18 NOTE — PROGRESS NOTES
CLINICAL NUTRITION SERVICES - ASSESSMENT NOTE     Nutrition Prescription    RECOMMENDATIONS FOR MDs/PROVIDERS TO ORDER:  Appreciate encouragement surrounding PO intake    Malnutrition Status:    Severe malnutrition in the context of chronic illness.     Recommendations already ordered by Registered Dietitian (RD):  - Continue Magic Cup (chocolate) BID with meals  - Sent trial of Ensure Shake (chocolate) with lunch  - Additional snacks/supplements PRN  - Ordered updated weight    Future/Additional Recommendations:  Monitor PO intake, supplement use, labs, and weight trends      REASON FOR ASSESSMENT  Shayy Cortez is a/an 63 year old female assessed by the dietitian for Admission Nutrition Risk Screen for positive (wt loss of 14-23 lbs, no decreased appetite)    PMH  PMH of cutaneous melanoma of the abdomen s/p one year treatment of nivolumab with development of progressive tremor, transient vertigo, abnormal eye movements and gait instability despite cessation of Nivolumab that started in August 2023. 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 Pt completed 5 day course of solumedrol and IVIG w/ improvement in tone, hyperreflexia, and tremor noted.  In addition to managing her neurologic status, she also has required medical mgmt of her urinary retention which is improving, constipation improving, blood sugars management due to high dose steroids and long term taper, pain, and mental health     NUTRITION HISTORY  Met with pt at bedside. She endorses significant weight loss. She was struggling with nausea for several months, which made it very difficult to eat. Recently, her appetite has improved and her nausea is much better. She was taking zofran and compazine for nausea, which did help. She typically eats 2 meals/day. Her biggest meal is in the morning and then she has an evening meal. She has tried protein drinks in the past, like Ensure, but has not found one that she likes. She does  "enjoy Magic Cups. She would like to continue receiving 2/day while admitted. She plans to purchase these once she is home as well.     CURRENT NUTRITION ORDERS  Diet: Regular  Snacks/supplements: Magic Cup (chocolate) BID with meals     Intake/Tolerance: 0% per flowsheets    LABS  Labs reviewed 1/19    MEDICATIONS  Medications reviewed  Calcium carbonate, BID  Folic acid  Novolog, low intensity sliding scale  Protonix  Potassium chloride  Prednisone  Vitamin D3    ANTHROPOMETRICS  Height: 157.5 cm (5' 2.01\")  Most Recent Weight: 48.9 kg (107 lb 11.2 oz)   IBW: 50 kg  BMI: Normal BMI  Weight History:   Wt Readings from Last 5 Encounters:   01/19/24 48.9 kg (107 lb 11.2 oz)   01/09/24 47.2 kg (104 lb)     Care Everywhere:   12/26/23: 46.4 kg (102 lb 4.7 oz)   11/30/23: 47 kg (103 lb 9.9 oz)   10/9/23: 51.3 kg (113 lb)   9/14/23: 51.7 kg (113 lb 15.7 oz)   8/15/23: 54.7 kg (120 lb 11.2 oz)   7/24/23: 56.6 kg (124 lb 12.5 oz)   6/8/23: 57.7 kg (127 lb 3.3 oz)   4/13/23: 55.6 kg (122 lb 9.2 oz)   3/9/23: 56 kg (123 lb 7.3 oz)   2/16/23: 53 kg (116 lb 13.5 oz)   1/5/23: 52 kg (114 lb 10.2 oz)     5.4% wt loss in 4 months, 13.6% wt loss in 6 months  Weight up from 1 month ago    Dosing Weight: 49 kg (admit wt)    ASSESSED NUTRITION NEEDS  Estimated Energy Needs: 6864-5047 kcals/day (30 - 35 kcals/kg )  Justification: Repletion  Estimated Protein Needs: 59-74 grams protein/day (1.2 - 1.5 grams of pro/kg)  Justification: Repletion  Estimated Fluid Needs: 1 mL/kcal  Justification: Maintenance    PHYSICAL FINDINGS  See malnutrition section below.    MALNUTRITION  % Intake: </=75% for >/= 1 month (severe)  % Weight Loss: > 10% in 6 months (severe)  Subcutaneous Fat Loss: Facial region: mild to moderate  Muscle Loss: Temporal: mild to moderate  Fluid Accumulation/Edema: None noted  Malnutrition Diagnosis: Severe malnutrition in the context of chronic illness.     NUTRITION DIAGNOSIS  Unintended weight loss related to nausea, " decreased appetite as evidenced by > 10% weight loss in 6 months.    INTERVENTIONS  Implementation  Nutrition Education: Provided education on reason for RD visit and role of RD in care. Discussed available snacks/supplements to optimize intake. Discussed trying Ensure mixed with ice cream, pt agreeable. Encouraged preferred foods from home/outside hospital as able.   Medical food supplement therapy - Magic Cup BID, trial of Ensure Shake    Goals  Patient to consume % of nutritionally adequate meal trays TID, or the equivalent with supplements/snacks.     Monitoring/Evaluation  Progress toward goals will be monitored and evaluated per protocol.   Carlotta Patton RD, TAMIE  ARU RD pager: [phone]  Weekend/Holiday RD pager: [phone]

## 2024-01-18 NOTE — PLAN OF CARE
Goal Outcome Evaluation:      Plan of Care Reviewed With: patient    Overall Patient Progress: improving    A&Ox4, tremors through bilateral upper and lower extremities, pt feels they have improved. Denies pain. L chest port still accessed. Good appetite. Potential discharge tomorrow to  ARU if insurance is authorized. Continue POC.

## 2024-01-18 NOTE — CONSULTS
"Social Work: Initial Assessment with Discharge Plan    Patient Name: Shayy Cortez  : 1960  Age: 63 year old  MRN: [id number]  Completed assessment with: Chart review and interview with patient.   Admitted to ARU: 2024    Presenting Information   Date of SW assessment: 2024  Health Care Directive: Patient considering completing, Provided education, and Health Care Directive Agent (if patient not able to make decisions)  Primary Health Care Agent: Patient/self   Secondary Health Care Agent: Adult children NOK.   Living Situation: Lives in a mobile home with s/o Kyle \"Jorge\" in [place], [place]. 4 CARMEN. No STI. All needs met on the main level. Tub shower. 1 small dog in the home.   Previous Functional Status: Stopped working within the last year due to chemo. Impaired mobility since Aug 2023. Was able to perform self cares and MOD I with 4WW. Few weeks prior to admission, pt was unable to stand and function significantly declined. S/o was helping with ADLs, including dependent showers. Family A with transportation, stopped driving due to vision. Has a license still. S/o helps with medications due to pt being too shaky, per pt report. Pt has no source of income so s/o manages the finances. Enjoys reading but unable to recently due to vision. Suggested audio books but pt has not explored that. Enjoys being with her grandchildren. Reported that pt used to  and take her grandkids to school so she was involved in their lives daily. Now with decline, she is unable and that's difficult to cope with.   DME available: See therapy evaluation for more information   Patient and family understanding of hospitalization: Appropriate and pleasant.   Cultural/Language/Spiritual Considerations: 62 y/o woman, english-speaking, , and Hoahaoism not listed.     Physical Health  Reason for admission: Neurologic Conditions 03.9 Other Neurologic: Stiff-Person Syndrome     Justification for Acute " Inpatient Rehabilitation  Shayy Cortez is a 63 year old female with PMH of cutaneous melanoma of the abdomen s/p one year treatment of nivolumab with development of progressive tremor, transient vertigo, abnormal eye movements and gait instability despite cessation of Nivolumab that started in August 2023. 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 Pt completed 5 day course of solumedrol and IVIG w/ improvement in tone, hyperreflexia, and tremor noted.  In addition to managing her neurologic status, she also has required medical mgmt of her urinary retention, constipation, blood sugars, pain, and mental health. She is medically stable and ready to discharge to acute inpatient rehab.   Normally, Shayy is fully independent w/ all mobility, ADLs, driving, and managing her own finances and IADLs.  Currently she is requiring Ax1 for all mobility and ADLs.  She demos impaired postural control when sitting to perform ADLs, needing Ax1 for trunk control to prevent falling over. The patient requires an intensive inpatient rehab program to address the following acute impairments: pain, tremors in eyes affecting her vision, impaired tone, lightheadedness, weakness, deconditioning, impaired fine motor coordination, tremors, and impaired postural control impacting her ability to safely mobilize and perform ADLs. The patient requires transfer to [facility] for intensive therapies not available in a lesser level of care including PT/OT, ongoing medical management at least 3 days per week, and rehabilitative nursing care as the ultimate treatment for stiff-person syndrome is directed at controlling symptoms to improve mobility and function.     Provider Information   Primary Care Physician: Dr. Valente at the [facility] in [place], [place] (confirmed).   [facility] will schedule PCP apt at discharge.   : None reported.     Mental Health/Chemical Dependency:   Diagnosis: Depression and anxiety.  "Medication-management. Psychology consulted upon arrival to ARU. Pt aware and in agreement.   Alcohol/Tobacco/Narcotis: Sober for 18 years. Hx of drug and alcohol abuse.   Support/Services in Place: Medication-management.   Services Needed/Recommended: Shireen and Health Psychology support while on ARU available.   Sexuality/Intimacy: Not discussed     Support System  Marital Status: In relationship with partner, Kyle \"Jorge\". Jorge works outside of the home at an auto shop. Does not have ARIAN or FMLA options. Supportive when home.   Family support: 4 adult children. Dtr lives in [facility] and other 3 kids live in [place], [place]. Grandkids, \"a ton\" per pt reported. Reported that her sister is supportive but lives in [place]. Reported that her adult kids are supportive and that her son often comes during the day to sit and spend time with pt while in the hospital.   Other support available: None reported.     Community Resources  Current in home services: SNAP.   Previous services: None reported.     Financial/Employment/Education  Employment Status: Unemployed. Worked as an RN in the past.   Income Source: No income. S/o salary. Has recently applied for SSDI.   Education: GED and completed RN school   Financial Concerns:  Yes. Hopeful that finances will be better once on SSDI.   Insurance: BLUE PLUS/BLUE PLUS ADVANTAGE [place]     Discharge Plan   Patient and family discharge goal: TBD, pending progress  Provided Education on discharge plan: Evaluations and discharge recommendations pending.   Patient agreeable to discharge plan:  Pending further discussion. Evaluations and discharge recommendations pending.   Provided education and attained signature for Medicare IM and IRF Patient Rights and Privacy Information provided to patient : N/A  Provided patient with [facility] Resources: N/A  Barriers to discharge: Needs to be MOD I as pt is alone during the day.     Discharge Recommendations " "  Disposition: See above   Transportation Needs: Patient, family/friends, paid transport, insurance transport (if applicable)     Additional comments   Discharge PRECIOUS MULLEN 8 days. Anticipate OP due to insurance. Pt should have transport benefits. Will give and discuss with pt. SW will remain available and continue to follow as needs arise.   -------------------------------------------------------------------------------------------------------------  NICKY Pain Assessment    Pain Effect on Sleep  Over the past 5 days, how much of the time has pain made it hard for you to sleep at night?\"    1. Rarely or not at all    Pain Interference with Therapy Activities  \"Over the past 5 days, how often have you limited your participation in rehabilitation therapy sessions due to pain?\"  4. Almost constantly    Pain Interference with Day-to-Day Activities  \"Over the past 5 days, how often have you limited your day-to-day activities (excluding rehabilitation therapy sessions) because of pain?\"  4. Almost constantly  -------------------------------------------------------------------------------------------------------------    [facility]   [address] [place] [address], [address]th [place] [address]  Phone: [phone], Fax: [phone], Pager: [phone]             "

## 2024-01-18 NOTE — DISCHARGE SUMMARY
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   -Spastic gait   -Full body postural tremors   -Abnormal eye movements  -Anxiety  -Depression  -Constipation    Brief History of Illness:   Shayy Cortez is a 63 year old female with past medical history of cutaneous melanoma of the abdomen s/p one year treatment of nivolumab with development of progressive tremor, transient vertigo, abnormal eye movements and gait instability despite cessation of Nivolumab that started in 2023. 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 PERM (progressive encephalomyelitis with rigidity and myoclonus) v.s. antibody negative autoimmune process.     Patient had no difficulties with her gait prior to August. Unclear etiology at this time, however, with highest suspicion for Stiff Person Syndrome. PERM is another consideration as it presents similarly and more often with urinary retention. Parkinsons plus syndromes have been considered given the patient's rigidity, bradykinesia and abnormal eye movements with square wave jerks; however, feel this is less likely given the patient's quickly progressing symptoms with reassuring radiologic findings (no  midbrain atrophy or hot cross buns sign). Furthermore, she has had two LP (including one this admission) that are suggestive of an inflammatory process with elevated cell counts with lymphocytic predominance, elevated protein, and presence of a couple oligoclonal bands. Given the patient's history of rheumatoid arthritis and rampant family history of autoimmune disease, suspect that an autoimmune process is most likely. Awaiting CSF movement disorder antibody panel results. If panel returns negative, could be an autoimmune, antibody negative process. Trialing high dose steroids, IVIG, and benzo's given the severity of her symptoms.     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 Delcid [facility]. She initially endorsed bilateral temporal and occipital headaches with severity of 4/10 being described as dull pain. Initially presented with mild confusion, dizziness, vertical diplopia, square wave jerks with movement of the eyes, high frequency tremor in the arms and legs with whole body jerks,  bradykinesia, and hyperreflexia. MRI brain and CTA head and neck unrevealing. In December 22 2023, her Neurologist recommended starting a 5-day course of high-dose solumedrol; however this was not started due to timing issues. During this hospitalization, patient completed IVIG 20g 5/5 dose 1/10/23-1/14/23 and Solumedrol 1000mg 5/5 dose (last on 1/15/23) alongside clonazepam; overall, significant improvement in ataxia and tremors. Plan to discontinue to ARU with steroid taper. Discharge taper plan: Start at 60mg, down by 10mg for 5 weeks; afterwards, continue PTA prednisone 5 mg daily for rheumatoid arthritis. For severe symptoms, continue clonazepam 0.5mg AM, 1mg at bedtime, 0.5mg daily PRN. Referral placed for outpatient neuroimmunology. Defer need for pulse IVIG to outpatient neuroimmunology provider. Follow-up ESTEPHANIA 65, DPPX  "antibodies ([facility] movement disorders panel both CSF and serum). Bactrim DS 3 times per week for PJP prophylaxis (starting 1/15 for 35 days) until high dose steroid treament completed. Continue vitamin D, Calcium supplement and PPI during steroid tapering for 35 days.    # Moderate Cervical Spinal Canal Stenosis of C3 - C7  Incidentally discovered when MRI C Spine performed given patient's significant/ Hyperreflexia with 4+ reflexes in patellars and sustained clonus. Possibly contributing to patient's hyperreflexia but cannot fully explain her presentation, particularly her abnormal eye movements. Furthermore, she previously had a positive jaw jerk reflex, suggestive of a neurological process affecting her brain. Notably, MRI was without cord signal change; thus do not suspect true myelopathy from her cervical stenosis. May consider repeat MRI in consult neurosurgery for intervention if symptoms worsen or hyperreflexia does not improve after IVIG/steroid treatment.     #Urinary Retention  Likely secondary to hydroxyzine that was given prn for anxiety or Bendaryl given with IVIG. Some improvement with stopping hydroxyzine however intermittent urinary rentention ~500-600ml still present. Possibly could also be dysautonomia secondary to her autoimmune process (urinary retention may occur more frequently in \"PERM\" compared to stiff person syndrome). Last day of Bendaryl/IVIG today, expect continued improvement in rehab. Reassuring that patient endorses spontaneous ability to urinate daily. Urinary retention resolved since 1/16/24. Monitor for retention in rehab, consider catheter if continues.      # Anxiety  # Depression   Patient experiencing difficulty sleeping due to anxiety while admitted. PTA Citalopram may be contributing to hyperreflexia. Treated with Citalopram PTA 40 mg daily and clonazepam as above.     # Constipation  Good relief with stool softeners (scheduled miralax and senna).    Pertinent " Investigations:  - CSF 12/14/23 clear and colorless, nucleated cell count 33, Protein 78, lymphocytosis 84, glucose 53, Kappa light chain 0.15, Oligoclonal bands 2  - CSF 1/11/23 clear and colorless, nucleated cell count 32, protein 85.2, lymphocytosis 91, glucose 54    MR Cervical Spine w/o & w Contrast   1/9/24  IMPRESSION:  1.  Diffuse degenerative change of the cervical spine as detailed above.  2.  Moderate spinal canal stenosis at C4-C5 and C5-C6.  3.  Moderate neural foraminal stenosis bilaterally at C3-C4, bilaterally at C4-C5, on the left at C5-C6 and on the right at C6-C7.    MR Brain w/o & w Contrast   1/9/24  IMPRESSION:  1.  No acute intracranial process.  2.  Generalized brain atrophy and presumed microvascular ischemic changes as detailed above.    Consultations:    OT/PT    Discharge Procedures:     Adult Neurology  Referral      General info for SNF    Length of Stay Estimate: Short Term Care: Estimated # of Days <30  Condition at Discharge: Stable  Level of care:board and care  Rehabilitation Potential: Fair  Admission H&P remains valid and up-to-date: Yes  Recent Chemotherapy: N/A  Use Nursing Home Standing Orders: Yes     Reason for your hospital stay    You were hospitalized and treated for tremors and spasticity of four extremities, gait instability.     Activity - Up with assistive device     Follow Up ([facility])    Follow up with Neurology Neuroimmunology Outpatient clinic    Appointments on [place] and/or [facility] (with [facility] or [facility] provider or service). Call [phone] if you haven't heard regarding these appointments within 7 days of discharge.     Full Code     Physical Therapy Adult Consult    Evaluate and treat as clinically indicated.    Reason:  unsteady gait     Occupational Therapy Adult Consult    Evaluate and treat as clinically indicated.    Reason:  tremor and spasticity of limbs     Fall precautions     Diet    Follow this diet upon discharge: Regular diet        Discharge Medications:  Current Discharge Medication List        START taking these medications    Details   calcium carbonate 500 mg, elemental, (OSCAL) 500 MG tablet Take 1 tablet (500 mg) by mouth 2 times daily (with meals)  Qty: 120 tablet, Refills: 0    Associated Diagnoses: Osteoporosis prophylaxis      !! clonazePAM (KLONOPIN) 0.5 MG tablet Take 2 tablets (1 mg) by mouth daily  Qty: 30 tablet, Refills: 0    Comments: Take 0.5mg in the morning scheduled, 0.5mg in the afternoon as needed.  Associated Diagnoses: Muscle stiffness      !! clonazePAM (KLONOPIN) 1 MG tablet Take 1 tablet (1 mg) by mouth at bedtime  Qty: 60 tablet, Refills: 0    Associated Diagnoses: Muscle stiffness      sulfamethoxazole-trimethoprim (BACTRIM DS) 800-160 MG tablet Take 1 tablet by mouth Every Mon, Wed, Fri Morning  Qty: 15 tablet, Refills: 0    Associated Diagnoses: Need for pneumocystis prophylaxis       !! - Potential duplicate medications found. Please discuss with provider.        CONTINUE these medications which have CHANGED    Details   citalopram (CELEXA) 20 MG tablet Take 1 tablet (20 mg) by mouth daily for 90 days  Qty: 90 tablet, Refills: 0    Associated Diagnoses: Anxiety      !! predniSONE (DELTASONE) 10 MG tablet Take 6 tablets (60 mg) by mouth daily for 7 days  Qty: 42 tablet, Refills: 0    Comments: 60mg dailly 1/17-1/23  Associated Diagnoses: Muscle stiffness      !! predniSONE (DELTASONE) 10 MG tablet Take 5 tablets (50 mg) by mouth daily for 6 days  Qty: 30 tablet, Refills: 0    Comments: 50mg daily 1/24-1/30  Associated Diagnoses: Muscle stiffness      !! predniSONE (DELTASONE) 10 MG tablet Take 4 tablets (40 mg) by mouth daily for 6 days  Qty: 24 tablet, Refills: 0    Comments: 40mg daily 1/31-2/6  Associated Diagnoses: Muscle stiffness      !! predniSONE (DELTASONE) 10 MG tablet Take 3 tablets (30 mg) by mouth daily for 6 days  Qty: 18 tablet, Refills: 0    Comments: 30mg daily 2/7-2/13  Associated  Diagnoses: Muscle stiffness      !! predniSONE (DELTASONE) 10 MG tablet Take 2 tablets (20 mg) by mouth daily for 6 days  Qty: 12 tablet, Refills: 0    Comments: 20mg daily 2/14-2/20  Associated Diagnoses: Muscle stiffness      !! predniSONE (DELTASONE) 10 MG tablet Take 1 tablet (10 mg) by mouth daily for 6 days  Qty: 6 tablet, Refills: 0    Comments: 10mg daily 2/21-2/27  Associated Diagnoses: Muscle stiffness      !! predniSONE (DELTASONE) 5 MG tablet Take 1 tablet (5 mg) by mouth every morning  Qty: 30 tablet, Refills: 3    Associated Diagnoses: Movement disorder       !! - Potential duplicate medications found. Please discuss with provider.        CONTINUE these medications which have NOT CHANGED    Details   acetaminophen (TYLENOL) 500 MG tablet Take 500-1,000 mg by mouth every 6 hours as needed for mild pain      cholecalciferol 50 MCG (2000 UT) CAPS Take 50 mcg by mouth daily      folic acid (FOLVITE) 1 MG tablet Take 1 tablet by mouth daily      levothyroxine (SYNTHROID/LEVOTHROID) 50 MCG tablet Take 1 tablet by mouth daily      omeprazole (PRILOSEC) 20 MG DR capsule Take 20 mg by mouth every morning (before breakfast)      ondansetron (ZOFRAN ODT) 4 MG ODT tab Place 8 mg under the tongue every 8 hours as needed for vomiting or nausea      potassium chloride ER (K-TAB/KLOR-CON) 10 MEQ CR tablet Take 10 mEq by mouth 2 times daily      meclizine (ANTIVERT) 25 MG tablet Take 25 mg by mouth 2 times daily as needed for dizziness      metoclopramide (REGLAN) 10 MG tablet Take 10 mg by mouth every 6 hours as needed (nausea)           STOP taking these medications       gabapentin (NEURONTIN) 100 MG capsule Comments:   Reason for Stopping:         lidocaine-prilocaine (EMLA) 2.5-2.5 % external cream Comments:   Reason for Stopping:         prochlorperazine (COMPAZINE) 10 MG tablet Comments:   Reason for Stopping:               Medication changes:  Continue steroid tapering as instructed above.  Bactrim DS 3 times  per week for PJP prophylaxis (starting 1/15 for 35 days) until high dose steroid treament completed. Continue vitamin D, Calcium supplement and PPI during steroid tapering as above.    Discharge physical examination:   Vitals:  B/P: 133/82, T: 98, P: 93, R: 16  General:  Adult, in NAD, cooperative  HEENT:  NC/AT, no icterus, op pink and moist, no ear or nose drainage.   Cardiac:  RRR, no m/r/g  Chest:  CTAB, no w/r/c  Abdomen:  S/NT/ND  Extremities:  No LE swelling.    Skin:  No rash or lesion.    Psych:  Mood pleasant, affect congruent  Neuro:  Mental status: Awake, alert, attentive, oriented to self, time, place, and circumstance. Language is fluent and coherent with intact comprehension of complex commands, naming and repetition.  Cranial nerves: VFF, PERRL - 2mm, conjugate gaze, EOMI without gaze palsy, occasional square wave jerks, still endorses occasional horizontal diplopia, facial sensation intact, face symmetric, shoulder shrug strong, tongue/uvula midline, no dysarthria. No jaw jerk reflex.  Motor: Normal bulk. Increased tone and spasticity in all extremities L > R hemibody. Low-frequency postural and intention tremor - no longer with obvious wing flapping tremors, no whole body jerks - improved from admission. 5/5 strength bilaterally in deltoids, biceps, triceps, hand , hip flexors, hip extensors, knee flexion, knee extension, plantarflexion, dorsiflexion.   Reflexes: 3+ in biceps, brachioradialis, triceps; 4+ in patellae and achilles. Negative Antony and toes down-going. Sustained Clonus present (8-10+).   Sensory: Intact to light touch, pinprick, vibration, and proprioception.  Coordination: FNF and HS without ataxia. Dysmetric on L FNF but likely in the setting of diplopia.   Gait: Deferred due to weakness and unsteadiness.    Discharge follow up and instructions:    1.  Diet:   Regular  2.  Activity:  Activity as tolerated  3.  Restrictions:  bear weight as tolerated, fall precaution  4.   Follow up:  Follow up with neuroimmunology clinic (referral placed).      A total of 30 minutes was spent on discharge activities. Patient counseled on discharge instructions and all questions answered.     Patient seen and discussed with Dr. Ross.    Sam Narvaez MD  PGY1 Neurology

## 2024-01-18 NOTE — PLAN OF CARE
Physical Therapy Discharge Summary    Reason for therapy discharge:    Discharged to acute rehabilitation facility.    Progress towards therapy goal(s). See goals on Care Plan in [facility] electronic health record for goal details.  Goals partially met.  Barriers to achieving goals:   discharge from facility.    Therapy recommendation(s):    Continued therapy is recommended.  Rationale/Recommendations:  for continued strengthening and mobility training.

## 2024-01-18 NOTE — PROGRESS NOTES
[id number]  Pt arrived to unit at 1300- oriented to room, call light given, vitals taken, admission packet given to patient. Pt is up with 1 assist uing walker and gb, continent of bowel and bladder- LBM 1/18/24 ( PVR this shift 6ml). On reg/thin takes pills whole. Skin intact. No other concerns as of now, continue with POC.

## 2024-01-18 NOTE — PLAN OF CARE
Occupational Therapy Discharge Summary    Reason for therapy discharge:    Discharged to acute rehabilitation facility.    Progress towards therapy goal(s). See goals on Care Plan in [facility] electronic health record for goal details.  Goals partially met.  Barriers to achieving goals:   discharge from facility.    Therapy recommendation(s):    Continued therapy is recommended.  Rationale/Recommendations:  continued OT to progress ADL and IADL independence and safety.

## 2024-01-19 ENCOUNTER — APPOINTMENT (OUTPATIENT)
Dept: OCCUPATIONAL THERAPY | Facility: CLINIC | Age: 64
End: 2024-01-19
Attending: PHYSICAL MEDICINE & REHABILITATION
Payer: COMMERCIAL

## 2024-01-19 ENCOUNTER — APPOINTMENT (OUTPATIENT)
Dept: PHYSICAL THERAPY | Facility: CLINIC | Age: 64
End: 2024-01-19
Attending: PHYSICAL MEDICINE & REHABILITATION
Payer: COMMERCIAL

## 2024-01-19 LAB
BASOPHILS # BLD AUTO: ABNORMAL 10*3/UL
BASOPHILS # BLD MANUAL: 0 10E3/UL (ref 0–0.2)
BASOPHILS NFR BLD AUTO: ABNORMAL %
BASOPHILS NFR BLD MANUAL: 0 %
EOSINOPHIL # BLD AUTO: ABNORMAL 10*3/UL
EOSINOPHIL # BLD MANUAL: 0 10E3/UL (ref 0–0.7)
EOSINOPHIL NFR BLD AUTO: ABNORMAL %
EOSINOPHIL NFR BLD MANUAL: 0 %
ERYTHROCYTE [DISTWIDTH] IN BLOOD BY AUTOMATED COUNT: 12.8 % (ref 10–15)
GLUCOSE BLDC GLUCOMTR-MCNC: 130 MG/DL (ref 70–99)
GLUCOSE BLDC GLUCOMTR-MCNC: 148 MG/DL (ref 70–99)
GLUCOSE BLDC GLUCOMTR-MCNC: 157 MG/DL (ref 70–99)
GLUCOSE BLDC GLUCOMTR-MCNC: 173 MG/DL (ref 70–99)
GLUCOSE BLDC GLUCOMTR-MCNC: 212 MG/DL (ref 70–99)
HCT VFR BLD AUTO: 32.5 % (ref 35–47)
HGB BLD-MCNC: 10.8 G/DL (ref 11.7–15.7)
HOLD SPECIMEN: NORMAL
IMM GRANULOCYTES # BLD: ABNORMAL 10*3/UL
IMM GRANULOCYTES NFR BLD: ABNORMAL %
LYMPHOCYTES # BLD AUTO: ABNORMAL 10*3/UL
LYMPHOCYTES # BLD MANUAL: 1 10E3/UL (ref 0.8–5.3)
LYMPHOCYTES NFR BLD AUTO: ABNORMAL %
LYMPHOCYTES NFR BLD MANUAL: 6 %
MCH RBC QN AUTO: 29.7 PG (ref 26.5–33)
MCHC RBC AUTO-ENTMCNC: 33.2 G/DL (ref 31.5–36.5)
MCV RBC AUTO: 89 FL (ref 78–100)
METAMYELOCYTES # BLD MANUAL: 0.2 10E3/UL
METAMYELOCYTES NFR BLD MANUAL: 1 %
MONOCYTES # BLD AUTO: ABNORMAL 10*3/UL
MONOCYTES # BLD MANUAL: 0.9 10E3/UL (ref 0–1.3)
MONOCYTES NFR BLD AUTO: ABNORMAL %
MONOCYTES NFR BLD MANUAL: 5 %
NEUTROPHILS # BLD AUTO: ABNORMAL 10*3/UL
NEUTROPHILS # BLD MANUAL: 15 10E3/UL (ref 1.6–8.3)
NEUTROPHILS NFR BLD AUTO: ABNORMAL %
NEUTROPHILS NFR BLD MANUAL: 88 %
NRBC # BLD AUTO: 0 10E3/UL
NRBC BLD AUTO-RTO: 0 /100
PLAT MORPH BLD: ABNORMAL
PLATELET # BLD AUTO: 343 10E3/UL (ref 150–450)
RBC # BLD AUTO: 3.64 10E6/UL (ref 3.8–5.2)
RBC MORPH BLD: ABNORMAL
WBC # BLD AUTO: 17.1 10E3/UL (ref 4–11)

## 2024-01-19 PROCEDURE — 128N000003 HC R&B REHAB

## 2024-01-19 PROCEDURE — 97116 GAIT TRAINING THERAPY: CPT | Mod: GP | Performed by: PHYSICAL THERAPIST

## 2024-01-19 PROCEDURE — 97166 OT EVAL MOD COMPLEX 45 MIN: CPT | Mod: GO

## 2024-01-19 PROCEDURE — 97112 NEUROMUSCULAR REEDUCATION: CPT | Mod: GP | Performed by: PHYSICAL THERAPIST

## 2024-01-19 PROCEDURE — 250N000011 HC RX IP 250 OP 636

## 2024-01-19 PROCEDURE — 97750 PHYSICAL PERFORMANCE TEST: CPT | Mod: GP | Performed by: PHYSICAL THERAPIST

## 2024-01-19 PROCEDURE — 85007 BL SMEAR W/DIFF WBC COUNT: CPT

## 2024-01-19 PROCEDURE — 85027 COMPLETE CBC AUTOMATED: CPT

## 2024-01-19 PROCEDURE — 97162 PT EVAL MOD COMPLEX 30 MIN: CPT | Mod: GP | Performed by: PHYSICAL THERAPIST

## 2024-01-19 PROCEDURE — 99232 SBSQ HOSP IP/OBS MODERATE 35: CPT | Mod: GC | Performed by: PHYSICAL MEDICINE & REHABILITATION

## 2024-01-19 PROCEDURE — 97535 SELF CARE MNGMENT TRAINING: CPT | Mod: GO

## 2024-01-19 PROCEDURE — 36415 COLL VENOUS BLD VENIPUNCTURE: CPT

## 2024-01-19 PROCEDURE — 97110 THERAPEUTIC EXERCISES: CPT | Mod: GP | Performed by: PHYSICAL THERAPIST

## 2024-01-19 PROCEDURE — 250N000013 HC RX MED GY IP 250 OP 250 PS 637

## 2024-01-19 PROCEDURE — 97110 THERAPEUTIC EXERCISES: CPT | Mod: GO

## 2024-01-19 PROCEDURE — 250N000012 HC RX MED GY IP 250 OP 636 PS 637

## 2024-01-19 RX ORDER — HEPARIN SODIUM,PORCINE 10 UNIT/ML
VIAL (ML) INTRAVENOUS
Status: COMPLETED
Start: 2024-01-19 | End: 2024-01-19

## 2024-01-19 RX ORDER — HEPARIN SODIUM,PORCINE 10 UNIT/ML
5-10 VIAL (ML) INTRAVENOUS
Status: DISCONTINUED | OUTPATIENT
Start: 2024-01-19 | End: 2024-01-26 | Stop reason: HOSPADM

## 2024-01-19 RX ORDER — HEPARIN SODIUM (PORCINE) LOCK FLUSH IV SOLN 100 UNIT/ML 100 UNIT/ML
5-10 SOLUTION INTRAVENOUS
Status: DISCONTINUED | OUTPATIENT
Start: 2024-01-19 | End: 2024-01-26 | Stop reason: HOSPADM

## 2024-01-19 RX ORDER — HEPARIN SODIUM,PORCINE 10 UNIT/ML
5-10 VIAL (ML) INTRAVENOUS EVERY 24 HOURS
Status: DISCONTINUED | OUTPATIENT
Start: 2024-01-19 | End: 2024-01-26 | Stop reason: HOSPADM

## 2024-01-19 RX ADMIN — Medication 50 MCG: at 08:02

## 2024-01-19 RX ADMIN — Medication: at 22:26

## 2024-01-19 RX ADMIN — PANTOPRAZOLE SODIUM 40 MG: 40 TABLET, DELAYED RELEASE ORAL at 06:12

## 2024-01-19 RX ADMIN — SENNOSIDES 1 TABLET: 8.6 TABLET, FILM COATED ORAL at 20:28

## 2024-01-19 RX ADMIN — INSULIN ASPART 1 UNITS: 100 INJECTION, SOLUTION INTRAVENOUS; SUBCUTANEOUS at 08:01

## 2024-01-19 RX ADMIN — ENOXAPARIN SODIUM 40 MG: 40 INJECTION SUBCUTANEOUS at 14:19

## 2024-01-19 RX ADMIN — CLONAZEPAM 0.5 MG: 0.5 TABLET ORAL at 08:01

## 2024-01-19 RX ADMIN — CLONAZEPAM 1 MG: 0.5 TABLET ORAL at 20:31

## 2024-01-19 RX ADMIN — POTASSIUM CHLORIDE 10 MEQ: 750 CAPSULE, EXTENDED RELEASE ORAL at 20:28

## 2024-01-19 RX ADMIN — POLYETHYLENE GLYCOL 3350 17 G: 17 POWDER, FOR SOLUTION ORAL at 20:28

## 2024-01-19 RX ADMIN — INSULIN ASPART 1 UNITS: 100 INJECTION, SOLUTION INTRAVENOUS; SUBCUTANEOUS at 12:31

## 2024-01-19 RX ADMIN — CALCIUM 500 MG: 500 TABLET ORAL at 08:02

## 2024-01-19 RX ADMIN — CITALOPRAM HYDROBROMIDE 20 MG: 20 TABLET ORAL at 08:02

## 2024-01-19 RX ADMIN — PREDNISONE 60 MG: 20 TABLET ORAL at 08:02

## 2024-01-19 RX ADMIN — CALCIUM 500 MG: 500 TABLET ORAL at 17:39

## 2024-01-19 RX ADMIN — INSULIN ASPART 1 UNITS: 100 INJECTION, SOLUTION INTRAVENOUS; SUBCUTANEOUS at 17:39

## 2024-01-19 RX ADMIN — FOLIC ACID 1000 MCG: 1 TABLET ORAL at 08:02

## 2024-01-19 RX ADMIN — SULFAMETHOXAZOLE AND TRIMETHOPRIM 1 TABLET: 800; 160 TABLET ORAL at 08:02

## 2024-01-19 RX ADMIN — POTASSIUM CHLORIDE 10 MEQ: 750 CAPSULE, EXTENDED RELEASE ORAL at 08:02

## 2024-01-19 RX ADMIN — Medication 5 ML: at 14:19

## 2024-01-19 RX ADMIN — LEVOTHYROXINE SODIUM 50 MCG: 50 TABLET ORAL at 08:02

## 2024-01-19 ASSESSMENT — ACTIVITIES OF DAILY LIVING (ADL)
ADLS_ACUITY_SCORE: 31
BADLS,_PREVIOUS_FUNCTIONAL_LEVEL: PARTIAL ASSISTANCE
PREVIOUS_RESPONSIBILITIES: MEAL PREP;HOUSEKEEPING;LAUNDRY;SHOPPING;DRIVING;MEDICATION MANAGEMENT
IADLS,_PREVIOUS_FUNCTIONAL_LEVEL: INDEPENDENT
ADLS_ACUITY_SCORE: 31
ADLS_ACUITY_SCORE: 33
ADLS_ACUITY_SCORE: 31

## 2024-01-19 NOTE — PROGRESS NOTES
"  [facility]   Acute Rehabilitation Unit    INTERVAL HISTORY  Notes and labs reviewed over past 24 hours. No acute overnight events reported    Patient was seen and examined. She reports she had a good night sleep and feels like her strength is returning and excited for therapy. She continues to report head/brain \"fogginess\" that is unchanged from initial assessment.     ROS: 10 point ROS was assessed and was negative unless otherwise stated in HPI      Functionally,    With PT: To be evaluated today.     With OT: To be evaluated today.           MEDICATIONS  Scheduled meds   calcium carbonate 500 mg (elemental)  500 mg Oral BID w/meals    citalopram  20 mg Oral Daily    clonazePAM  0.5 mg Oral Daily    clonazePAM  1 mg Oral At Bedtime    enoxaparin ANTICOAGULANT  40 mg Subcutaneous Q24H    folic acid  1,000 mcg Oral Daily    insulin aspart  1-3 Units Subcutaneous TID AC    insulin aspart  1-3 Units Subcutaneous At Bedtime    levothyroxine  50 mcg Oral Daily    pantoprazole  40 mg Oral QAM AC    polyethylene glycol  17 g Oral BID    potassium chloride ER  10 mEq Oral BID    [START ON 2/22/2024] predniSONE  10 mg Oral Daily    [START ON 2/15/2024] predniSONE  20 mg Oral Daily    [START ON 2/8/2024] predniSONE  30 mg Oral Daily    [START ON 2/1/2024] predniSONE  40 mg Oral Daily    [START ON 2/29/2024] predniSONE  5 mg Oral QAM    [START ON 1/25/2024] predniSONE  50 mg Oral Daily    predniSONE  60 mg Oral Daily    sennosides  1 tablet Oral BID    sodium chloride (PF)  10-20 mL Intracatheter Q28 Days    sulfamethoxazole-trimethoprim  1 tablet Oral Q Mon Wed Fri AM    cholecalciferol  50 mcg Oral Daily       PRN meds:  acetaminophen, clonazePAM, glucose **OR** dextrose **OR** glucagon, lidocaine, meclizine, metoclopramide, sodium chloride (PF), sodium chloride (PF)      PHYSICAL EXAM  /79 (BP Location: Right arm, Patient Position: Sitting, Cuff Size: Adult Regular)   Pulse 99  "  Temp 97.6  F (36.4  C) (Oral)   Resp 16   SpO2 98%   General: NAD, pleasant and cooperative   HEENT: ATNC, oropharynx clear with MMM, EOMI, PERRL    Pulmonary: clear breath sounds b/l, no rales/wheezing    Cardiovascular: RRR, no murmur   Abdominal: soft, non-tender, non-distended, normoactive bowel sounds bilaterally  Extremities: warm, well perfused, no edema in bilateral lower extremities, no tenderness in calves   MSK/neuro: Moves bilateral upper and lower extremities volitionally against gravity. Sustained clonus in bilateral feet. +hoffmans bilaterally      LABS  Results for orders placed or performed during the hospital encounter of 01/18/24 (from the past 24 hour(s))   Creatinine   Result Value Ref Range    Creatinine 0.94 0.51 - 0.95 mg/dL    GFR Estimate 69 >60 mL/min/1.73m2   Glucose by meter   Result Value Ref Range    GLUCOSE BY METER POCT 269 (H) 70 - 99 mg/dL   Glucose by meter   Result Value Ref Range    GLUCOSE BY METER POCT 207 (H) 70 - 99 mg/dL   Glucose by meter   Result Value Ref Range    GLUCOSE BY METER POCT 130 (H) 70 - 99 mg/dL   CBC with platelets differential    Narrative    The following orders were created for panel order CBC with platelets differential.  Procedure                               Abnormality         Status                     ---------                               -----------         ------                     CBC with platelets and d...[[id number]]  Abnormal            Final result               Manual Differential[[id number]]          Abnormal            Final result                 Please view results for these tests on the individual orders.   CBC with platelets and differential   Result Value Ref Range    WBC Count 17.1 (H) 4.0 - 11.0 10e3/uL    RBC Count 3.64 (L) 3.80 - 5.20 10e6/uL    Hemoglobin 10.8 (L) 11.7 - 15.7 g/dL    Hematocrit 32.5 (L) 35.0 - 47.0 %    MCV 89 78 - 100 fL    MCH 29.7 26.5 - 33.0 pg    MCHC 33.2 31.5 - 36.5 g/dL    RDW 12.8 10.0 - 15.0 %     Platelet Count 343 150 - 450 10e3/uL    % Neutrophils      % Lymphocytes      % Monocytes      % Eosinophils      % Basophils      % Immature Granulocytes      NRBCs per 100 WBC 0 <1 /100    Absolute Neutrophils      Absolute Lymphocytes      Absolute Monocytes      Absolute Eosinophils      Absolute Basophils      Absolute Immature Granulocytes      Absolute NRBCs 0.0 10e3/uL   Extra Tube    Narrative    The following orders were created for panel order Extra Tube.  Procedure                               Abnormality         Status                     ---------                               -----------         ------                     Extra Green Top (Lithium...[[id number]]                      Final result                 Please view results for these tests on the individual orders.   Extra Green Top (Lithium Heparin) Tube   Result Value Ref Range    Hold Specimen [facility]    Manual Differential   Result Value Ref Range    % Neutrophils 88 %    % Lymphocytes 6 %    % Monocytes 5 %    % Eosinophils 0 %    % Basophils 0 %    % Metamyelocytes 1 %    Absolute Neutrophils 15.0 (H) 1.6 - 8.3 10e3/uL    Absolute Lymphocytes 1.0 0.8 - 5.3 10e3/uL    Absolute Monocytes 0.9 0.0 - 1.3 10e3/uL    Absolute Eosinophils 0.0 0.0 - 0.7 10e3/uL    Absolute Basophils 0.0 0.0 - 0.2 10e3/uL    Absolute Metamyelocytes 0.2 (H) <=0.0 10e3/uL    RBC Morphology Confirmed RBC Indices     Platelet Assessment  Automated Count Confirmed. Platelet morphology is normal.     Automated Count Confirmed. Platelet morphology is normal.   Glucose by meter   Result Value Ref Range    GLUCOSE BY METER POCT 157 (H) 70 - 99 mg/dL       ASSESSMENT AND PLAN    Shayy Cortez is a 63 year old female with PMH of cutaneous melanoma of the abdomen s/p one year treatment of nivolumab with development of progressive tremor, transient vertigo, abnormal eye movements and gait instability despite cessation of Nivolumab that started in August 2023. Neurology consulted and  highest suspicion for Stiff Person Syndrome. Pt completed 5 day course of solumedrol and IVIG w/ improvement in tone, hyperreflexia, and tremor noted.  In addition to managing her neurologic status, she also has required medical mgmt of her urinary retention which is improving, constipation improving, blood sugars management due to high dose steroids and long term taper, pain, and mental health        Admission to acute inpatient rehab 01/18.    Impairment group code: Neurologic Conditions 03.9 Other Neurologic: Stiff-Person Syndrome         PT, OT and SLP 90 minutes of each 6 days per week, in addition to rehab nursing and close management of physiatrist.       Impairment of ADL's:  OT for 90 minutes 6 days per week to work on ADL re-training such as grooming, self cares and bathing.       Impairment of mobility:  PT for 90 minutes 6 days per week to work on neuromuscular re-education focusing on strength, balance, coordination, and endurance.          Rehab RN for med administration, bowel regimen, glucose monitoring and wound care.         Medical Conditions  #Concern for stiff person syndrome vs PERM vs antibody negative autoimmune process.   #Spastic gait   #Full body postural tremors   #Square wave jerks  She has a primary neurologist, Dr. Delcid in [facility]. She developed symptoms in August of headaches, dizziness, uncontrolled tremors and mild confusion. MRI brain and CTA head and neck unrevealing.During this hospitalization, patient completed IVIG 20g 5/5 dose 1/10/23-1/14/23 and Solumedrol 1000mg 5/5 dose (last on 1/15/23) alongside clonazepam   -PT and OT  -Continue Clonazepam 0.5mg in AM, 1mg at bedtime and 0.5mg daily PRN.   -Continue with prednisone taper               --60mg for seven total doses (ending 1/24). Then decrease by 10mg every 7 days (ending 2/28). Will then continue PTA chronic 5mg prednisone daily.   -Continue prophylactic bactrim for 35 total days (started 1/15) while on high dose  steroids  -Vit D, calcium supplementation and protonix for 35 days during steroid taper.   -Referral placed for outpatient neuroimmunology for potential initiation of pulse IVIG.   -Follow results for ESTEPHANIA 65, DPPX antibodies.      #Rheumatoid Arthritis  -Steroid taper and treatment as directed above.      #Pain  -Tyelonol and lidocaine patches as needed.  -Can consider baclofen if muscle spasticity or rigidity develop and pain increases. Will assess while at ARU     #Steroid induced hyperglycemia   Have been requiring 2-3 units per day inconsistently.   -Continue QID BG check and sliding scale insulin for now  -Consider to change to bid check if stable      #Leukocytosis   #Anemia  Leukocytosis due to steroid and not concerning at this point   Anemia seems chronic but stable.   -Will monitor clinically and check weekly      # Moderate Cervical Spinal Canal Stenosis of C3 - C7  Incidental finding on C-Spine MRI. MRI without cord signal change so less likely myelopathy from cervical stenosis. May consider repeat MRI and neurosurgery referral in future if symptoms don't improve with steroid treatment.      # Anxiety  # Depression  - Continue with 20mg Citalopram. Previously on 40mg PTA. Consider increasing to PTA dosing during ARU stay.  -Patient continues to deny desire at this time for clinical psychology at this time while at the ARU.      #Hypothyroidism  -Continue PTA levothyroxine      #Bladder  #Urinary Retention  Patient had urinary retention requiring keita catheter in acute setting.   -Patent voiding on her own volition without any complications.   -PVR X2 negative upon admission. Bladder protocol in place.      #Bowel  Patient had issues of constipation in acute setting.   -Continue schedule miralax and Senokot BID.            Adjustment to disability:  Patient denied desire at this time for clinical psychology at this time while at the ARU.   FEN: Regular diet with thin liquids  DVT Prophylaxis: Lovenox  until ambulating community distance  GI Prophylaxis: Protonix while on steroid taper  Code: Full, confirmed on admission  Disposition: Home  ELOS:  8 days.  Rehab prognosis:  Good  Follow up Appointments on Discharge:   Neuroimmunology  Neurology  PCP  PM&R        Seen and discussed with Dr. Rivas, PM&R staff physician     Trevon Marr DO  PGY2  Physical Medicine and Rehabilitation-[facility]  Pager: [phone]

## 2024-01-19 NOTE — PROGRESS NOTES
01/19/24 0921   Appointment Info   Signing Clinician's Name / Credentials (OT) Stephy Wendy, OTR/L   Living Environment   People in Home significant other   Current Living Arrangements mobile home   Home Accessibility stairs to enter home   Number of Stairs, Main Entrance 4   Stair Railings, Main Entrance railings safe and in good condition   Transportation Anticipated family or friend will provide   Living Environment Comments Pt has both walk in and tub shower. Grab bar in walk in shower.   Self-Care   Usual Activity Tolerance moderate   Current Activity Tolerance poor   Regular Exercise No   Equipment Currently Used at Home walker, rolling  (4 WW)   Fall history within last six months no   Activity/Exercise/Self-Care Comment IND/Mod I prior to ~4 months ago, has recently been receiving assist for all ADLs   Instrumental Activities of Daily Living (IADL)   Previous Responsibilities meal prep;housekeeping;laundry;shopping;driving;medication management   IADL Comments At baseline IND/Mod I with IADLs. Recently receiving more assist.   Post-Acute Assessment Only   Post-Acute Functional Assessment See below   Previous Level of Function/Home Environm   Bathing, Previous Functional Level partial assistance   Grooming, Previous Functional Level independent   Dressing, Previous Functional Level partial assistance   Eating/Feeding, Previous Functional Level independent   Toileting, Previous Functional Level independent   BADLs, Previous Functional Level partial assistance   IADLs, Previous Functional Level independent   Bed Mobility, Previous Functional Level independent   Transfers, Previous Functional Level independent;uses device or equipment   Household Ambulation, Previous Functional Level independent;uses device or equipment   Stairs, Previous Functional Level independent   Community Ambulation, Previous Functional Level independent;uses device or equipment   Functional Cognition, Previous Functional Level IND  "  General Information   Onset of Illness/Injury or Date of Surgery 01/18/23   Referring Physician Trevon Marr, DO   Patient/Family Therapy Goal Statement (OT) To get stronger and get back to feeling like herself   Additional Occupational Profile Info/Pertinent History of Current Problem Per chart: 63 year old female with PMH of cutaneous melanoma of the abdomen s/p one year treatment of nivolumab with development of progressive tremor, transient vertigo, abnormal eye movements and gait instability despite cessation of Nivolumab that started in August 2023. 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 Pt completed 5 day course of solumedrol and IVIG w/ improvement in tone, hyperreflexia, and tremor noted.  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 Pt reports feeling like \"everything is slow motion\" and \"feeling foggy.\" Will continue to monitor.   Visual Perception   Visual Impairment/Limitations corrective lenses for reading   Impact of Vision Impairment on Function (Vision) Pt with \"nystagmus\" like tremors in eyes intermittantly with both focus and visual tracking. Pt reports no " functional concerns at this time.   Sensory   Sensory Quick Adds sensation intact   Pain Assessment   Patient Currently in Pain No   Posture   Posture forward head position;protracted shoulders   Range of Motion Comprehensive   General Range of Motion bilateral upper extremity ROM WNL   Strength Comprehensive (MMT)   General Manual Muscle Testing (MMT) Assessment hand strength deficits identified   Comment, General Manual Muscle Testing (MMT) Assessment Pt grossly 3/5 in BUE, demonstrates generalized weakness/deconditioning   Hand Strength   Right three point pinch (pounds) 7   Left three point pinch (pounds) 7   Right lateral pinch (pounds) 8   Left lateral pinch (pounds) 9   Muscle Tone Assessment   Muscle Tone Quick Adds No deficits were identified   Coordination   Left hand, nine hole peg test (seconds) 44   Right hand, nine hole peg test (seconds) 42   Bed Mobility   Comment (Bed Mobility) Mod I   Transfers   Transfer Comments See GG.   Balance   Balance Comments See GG.   Activities of Daily Living   BADL Assessment/Intervention   (See GG.)   Clinical Impression   Criteria for Skilled Therapeutic Interventions Met (OT) Yes, treatment indicated   OT Diagnosis decreased safety/engagement in ADLs/IADLs   Influenced by the following impairments impaired balance, impaired strength, impaired activity tolerance, impaired vision, impaired coordination   OT Problem List-Impairments impacting ADL problems related to;activity tolerance impaired;balance;cognition;mobility;coordination;strength;vision   Assessment of Occupational Performance 5 or more Performance Deficits   Identified Performance Deficits dressing, bathing, toileting, functional mobility, home mgmt   Planned Therapy Interventions (OT) ADL retraining;IADL retraining;balance training;strengthening;ROM;neuromuscular re-education;transfer training;progressive activity/exercise;home program guidelines;risk factor education;visual perception;cognition;stretching    Clinical Decision Making Complexity (OT) detailed assessment/moderate complexity   Risk & Benefits of therapy have been explained evaluation/treatment results reviewed;care plan/treatment goals reviewed;risks/benefits reviewed;current/potential barriers reviewed;participants voiced agreement with care plan;participants included;patient   Clinical Impression Comments Pt is a 62 y/o female   OT Total Evaluation Time   OT Eval, Moderate Complexity Minutes (15330) 30   OT Goals   Therapy Frequency (OT) 6 times/week   OT Predicted Duration/Target Date for Goal Attainment 01/26/24   OT Goals Hygiene/Grooming;Upper Body Dressing;Lower Body Dressing;Upper Body Bathing;Lower Body Bathing;Toilet Transfer/Toileting;Meal Preparation;Home Management;Cognition   OT: Hygiene/Grooming modified independent   OT: Upper Body Dressing Modified independent   OT: Lower Body Dressing Modified independent   OT: Upper Body Bathing Modified independent   OT: Lower Body Bathing Modified independent   OT: Toilet Transfer/Toileting Modified independent   OT: Meal Preparation Modified independent;with simple meal preparation;ambulatory level   OT: Home Management Modified independent;with light demand household tasks;ambulatory level   OT: Cognitive Patient/caregiver will verbalize understanding of cognitive assessment results/recommendations as needed for safe discharge planning   Interventions   Interventions Quick Adds Self-Care/Home Management   Self-Care/Home Management   Self-Care/Home Mgmt/ADL, Compensatory, Meal Prep Minutes (24397) 15   Symptoms Noted During/After Treatment (Meal Preparation/Planning Training) fatigue   Treatment Detail/Skilled Intervention Session focused on welcome to rehab, OT goals, completing initial GG ADLs. Cues provided for self monitoring of fatigue, safety with FWW. See GG below.   OT Discharge Planning   OT Plan shower: w/c to shower, SPT with FWW, complete GG, ACE-III   Total Session Time   Timed Code  Treatment Minutes 15   Total Session Time (sum of timed and untimed services) 45   Post Acute Settings Only   What unit is patient on? Acute Rehab   OT - Acute Rehab Center Time   Individual Time (minutes) - OT 45   Group Time (minutes) - OT 0   Concurrent Time (minutes) - OT 0   Co-Treatment Time (minutes) - OT 0   ARC Total Session Time (minutes) - OT 45   ARC Daily Total Session Time   OT ARC Daily Total Session Time 45   ARC Daily Rehab Total Minutes 45   Oral Hygiene   Complete independence for oral hygiene tasks no   Describe performance SBA with FWW   Grooming (except oral cares)   Grooming Task Performed Washing hands   Grooming Comment SBA with FWW   Toilet Transfer   Describe performance CGA with FWW   Chair/bed-to-chair Transfer   Describe performance CGA with FWW

## 2024-01-19 NOTE — PLAN OF CARE
Goal Outcome Evaluation:      Plan of Care Reviewed With: patient    Overall Patient Progress: no change    Outcome Evaluation: No change in patient progress.    Orientation: A/Ox4  Bowel: No BM on this shift  Bladder: Continent of bladder using toilet in bathroom. 1 PVR still needed  Pain: Denies pain  Ambulation/Transfers: CGA with walker for transfers and ambulation  Blood sugars: See result tab for details  Diet/ Liquids: Tolerating diet with fair appetite  Tubes/ Lines/ Drains:  Port-a-cath to L chest heparin locked  Skin: Unremarkable   Bed alarm on for safety, call light within reach. Continue with POC.

## 2024-01-19 NOTE — PHARMACY-MEDICATION REGIMEN REVIEW
Pharmacy Medication Regimen Review  Shayy Cortez is a 63 year old female who is currently in the Acute Rehab Unit.    Assessment: Upon review of the medications and patient chart the following irregularities were found:     Medications that require additional monitoring include:   -Potassium chloride - per pharmacy medication history completed 1/9/24 patient reported taking potassium chloride 10 mEq once daily (although it is prescribed twice daily). Pt was not receiving potassium chloride while in the hospital, it has been restarted at 10 mEq twice daily at ARU. Recommend continued monitoring of potassium levels.    Other Recommendations:   -Patient was receiving gabapentin 300 mg at bedtime while in the hospital up until discharge which has not been continued at ARU. Gabapentin originally prescribed starting 12/22/23 at a neurology appointment for her tremors.     Plan:   Continue to monitor potassium levels and adjust potassium chloride dose if needed  Assess if gabapentin to be continued    Attending provider will be sent this note for review.  If there are any emergent issues noted above, pharmacist will contact provider directly by phone.      Pharmacy will periodically review the resident's medication regimen for any PRN medications not administered in > 72 hours and discontinue them. The pharmacist will discuss gradual dose reductions of psychopharmacologic medications with interdisciplinary team on a regular basis.    Please contact pharmacy if the above does not answer specific medication questions/concerns.    Background:  A pharmacist has reviewed all medications and pertinent medical history today.  Medications were reviewed for appropriate use and any irregularities found are listed with recommendations.    Hanane Chambers, FrancineD, BCPS    Current Facility-Administered Medications:     acetaminophen (TYLENOL) tablet 650 mg, 650 mg, Oral, Q4H PRN, Elizabeth Landaverde MD    calcium carbonate 500 mg  (elemental) (OSCAL) tablet 500 mg, 500 mg, Oral, BID w/meals, JoelTrevon, , 500 mg at 01/19/24 0802    citalopram (celeXA) tablet 20 mg, 20 mg, Oral, Daily, Trevon Marr, , 20 mg at 01/19/24 0802    clonazePAM (klonoPIN) tablet 0.5 mg, 0.5 mg, Oral, Daily PRN, Trevon Marr DO    clonazePAM (klonoPIN) tablet 0.5 mg, 0.5 mg, Oral, Daily, Trevon Marr, , 0.5 mg at 01/19/24 0801    clonazePAM (klonoPIN) tablet 1 mg, 1 mg, Oral, At Bedtime, JoelTrevon DO, 1 mg at 01/18/24 2113    glucose gel 15-30 g, 15-30 g, Oral, Q15 Min PRN **OR** dextrose 50 % injection 25-50 mL, 25-50 mL, Intravenous, Q15 Min PRN **OR** glucagon injection 1 mg, 1 mg, Subcutaneous, Q15 Min PRN, Trevon Marr DO    enoxaparin ANTICOAGULANT (LOVENOX) injection 40 mg, 40 mg, Subcutaneous, Q24H, JoelTrevon DO, 40 mg at 01/18/24 1704    folic acid (FOLVITE) tablet 1,000 mcg, 1,000 mcg, Oral, Daily, JoelTrevon, , 1,000 mcg at 01/19/24 0802    heparin 100 unit/mL injection 5-10 mL, 5-10 mL, Intracatheter, Q28 Days, Trevon Marr DO    heparin lock flush 10 UNIT/ML injection 5-10 mL, 5-10 mL, Intracatheter, Q1H PRN, Trevon Marr DO    heparin lock flush 10 UNIT/ML injection 5-10 mL, 5-10 mL, Intracatheter, Q24H, Trevon Marr DO    insulin aspart (NovoLOG) injection (RAPID ACTING), 1-3 Units, Subcutaneous, TID AC, Trevon Marr, , 1 Units at 01/19/24 1231    insulin aspart (NovoLOG) injection (RAPID ACTING), 1-3 Units, Subcutaneous, At Bedtime, Trevon Marr DO, 1 Units at 01/18/24 2229    levothyroxine (SYNTHROID/LEVOTHROID) tablet 50 mcg, 50 mcg, Oral, Daily, Trevon Marr DO, 50 mcg at 01/19/24 0802    Lidocaine (LIDOCARE) 4 % Patch 2 patch, 2 patch, Transdermal, Daily PRN, Trevon Marr DO    meclizine (ANTIVERT) tablet 25 mg, 25 mg, Oral, BID PRN, Trevon Marr DO     metoclopramide (REGLAN) tablet 10 mg, 10 mg, Oral, Q6H PRN, [facility] Trevonsandro Billy     pantoprazole (PROTONIX) EC tablet 40 mg, 40 mg, Oral, QAM AC, [facility] Trevonsandro Billy , 40 mg at 01/19/24 0612    polyethylene glycol (MIRALAX) Packet 17 g, 17 g, Oral, BID, [facility]     potassium chloride ER (MICRO-K) CR capsule 10 mEq, 10 mEq, Oral, BID, [facility], , 10 mEq at 01/19/24 0802    [START ON 2/22/2024] predniSONE (DELTASONE) tablet 10 mg, 10 mg, Oral, Daily, [facility] Trevonsandro Billy DO    [START ON 2/15/2024] predniSONE (DELTASONE) tablet 20 mg, 20 mg, Oral, Daily, [facility] Trevon Wenceslao,     [START ON 2/8/2024] predniSONE (DELTASONE) tablet 30 mg, 30 mg, Oral, Daily, [facility] Trevon Wenceslao,     [START ON 2/1/2024] predniSONE (DELTASONE) tablet 40 mg, 40 mg, Oral, Daily, [facility] Trevon Wenceslao,     [START ON 2/29/2024] predniSONE (DELTASONE) tablet 5 mg, 5 mg, Oral, QAM, [facility] Trevon Wenceslao,     [START ON 1/25/2024] predniSONE (DELTASONE) tablet 50 mg, 50 mg, Oral, Daily, [facility]     predniSONE (DELTASONE) tablet 60 mg, 60 mg, Oral, Daily, [facility] , 60 mg at 01/19/24 0802    sennosides (SENOKOT) tablet 1 tablet, 1 tablet, Oral, BID, GreenTrevon roy DO    sodium chloride (PF) 0.9% PF flush 10-20 mL, 10-20 mL, Intracatheter, q1 min prn, Elizabeth Landavered MD    sodium chloride (PF) 0.9% PF flush 10-20 mL, 10-20 mL, Intracatheter, Q1H PRN, Elizabeth Landaverde MD    sodium chloride (PF) 0.9% PF flush 10-20 mL, 10-20 mL, Intracatheter, Q28 Days, Elizabeth Landaverde MD, 10 mL at 01/19/24 0612    sulfamethoxazole-trimethoprim (BACTRIM DS) 800-160 MG per tablet 1 tablet, 1 tablet, Oral, Q Mon Wed Fri AM, Trevon Marr DO, 1 tablet at 01/19/24 0802    Vitamin D3 (CHOLECALCIFEROL) tablet 50 mcg, 50 mcg, Oral, Daily, Trevon Marr DO, 50 mcg at 01/19/24 0802

## 2024-01-19 NOTE — PHARMACY-ADMISSION MEDICATION HISTORY
Admission medication history completed at [facility]. Please see Pharmacy Intern Admission Medication History note from 1/9/2024.

## 2024-01-19 NOTE — PROGRESS NOTES
01/19/24 1400   Signing Clinician's Name / Credentials   Signing clinician's name / credentials Theodore Tran, SUNIL   Delgadillo Balance Scale (TARA SALDIVAR, DREW S, PIOTR PEREZ, ELLI SAEZ: MEASURING BALANCE IN THE ELDERLY: VALIDATION OF AN INSTRUMENT. CAN. J. PUB. HEALTH, JULY/AUGUST SUPPLEMENT 2:S7-11, 1992.)   Sit To Stand 3   Standing Unsupported 3   Sitting Unsupported 4   Stand to Sit 3   Transfers 3   Standing with Eyes Closed 3   Standing Unsupported, Feet Together 1   Reach Forward With Outstretched Arm 1   Retrieve Object From Floor 0   Turning to Look Behind 0   Turn 360 Degrees 1   Placing Alternate Foot on Stool (4-6 inches) 2   Unsupported Tandem Stand (Demonstrate to Subject) 0   One Leg Stand 0   Total Score (A score of 45 or less has been correlated with an increased risk of falls)   Total Score (out of 56) 24       Delgadillo Balance Scale (BBS) Cutoff Scores: A score of < 45 /56 indicates an increased risk for falls.     The BBS is a measure of static and dynamic standing balance that has been validated in community dwelling elderly individuals and individuals who have Parkinson's Disease, MS, and those who are s/p CVA and TBI. The test is administered without an assistive device. Scores from the Delgadillo are used to determine the probability of falling based on the patient's previous history of falls and their test performance.     Minimal Detectable Change = 6.5   & Minimal Detectable Change (Parkinson's Disease) = 5 according to Segun & Taiey 2008    Assessment (rationale for performing, application to patient s function & care plan): Pt completed Delgadillo Balance Assessment at initiation of stay on ARU to assess overall balance, safety and areas of deficit in order to determine POC.  Pt does well with static tasks and dynamic tasks with UE support.  Pt has increased difficulty with no UE dynamic balance tasks.  Pt often loses balance posterior needing A to correct.  PT will continue to address standing  balance and use BBA to assess progression toward LTG of safe discharge home.  (Minutes billed as physical performance test): 20

## 2024-01-19 NOTE — PROGRESS NOTES
01/19/24 1115   Appointment Info   Signing Clinician's Name / Credentials (PT) Theodore Tran DPT   Living Environment   People in Home significant other   Current Living Arrangements mobile home   Home Accessibility stairs to enter home   Number of Stairs, Main Entrance 4   Stair Railings, Main Entrance railings on both sides of stairs;railings safe and in good condition   Transportation Anticipated family or friend will provide   Living Environment Comments Pt lives with SO in manufacture home with 4 steps to enter with B railings, but only able to access x1 at a time.  Does not have 24 hour care and will need to be Mod I.   Self-Care   Usual Activity Tolerance good   Current Activity Tolerance poor   Regular Exercise No   Equipment Currently Used at Home walker, rolling   Fall history within last six months no   Activity/Exercise/Self-Care Comment Ind at onset of medical decline, but has slowly decreased to dependent for mobilitybefore admission to hospital.  Has 4ww at home.   General Information   Onset of Illness/Injury or Date of Surgery 01/09/24   Referring Physician Trevon Marr, DO   Patient/Family Therapy Goals Statement (PT) Pt world like safe mobility   Pertinent History of Current Problem (include personal factors and/or comorbidities that impact the POC) From H&P:  PMH of cutaneous melanoma of the abdomen s/p one year treatment of nivolumab with development of progressive tremor, transient vertigo, abnormal eye movements and gait instability despite cessation of Nivolumab that started in August 2023. 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 Pt completed 5 day course of solumedrol and IVIG w/ improvement in tone, hyperreflexia, and tremor noted.  In addition to managing her neurologic status, she also has required medical mgmt of her urinary retention which is improving, constipation improving, blood sugars management due to high dose steroids and long term  "taper, pain, and mental health   Existing Precautions/Restrictions fall   General Observations Pt noted \"foggy\" thinking, but vertigo and double vision has gone away.  She is also still having difficult with prolonged focus with vision.   Cognition   Cognitive Status Comments Pt noted on going baseline foggy-light-headeness, but no cognitive deficits.   Pain Assessment   Patient Currently in Pain No   Integumentary/Edema   Integumentary/Edema no deficits were identifed   Posture    Posture Not impaired   Range of Motion (ROM)   ROM Comment Pt has decreased ROM in LE noted significantly in hamstrings and heelcords   Strength (Manual Muscle Testing)   Strength Comments Pt is grossly 4/5 strength for mm testing using iso holds.  She demonstrates decreased (3/5) strength for concentric functional strength.   Balance   Balance Comments SBA for sitting edge of bed > 5mins.  CGA for staatic standing balance no UE support.  Pt neededs UE support for dyanmic standing balance.   Sensory Examination   Sensory Perception patient reports no sensory changes   Coordination   Coordination no deficits were identified   Muscle Tone   Muscle Tone Comments Pt demonstrates ongoing clonus at B ankles.  Noted increase in attention tremur as pt fatigues.  Pt reports sig improvement in tremurs compared to before hospitalization.   Clinical Impression   Criteria for Skilled Therapeutic Intervention Yes, treatment indicated   PT Total Evaluation Time   PT Eval, Moderate Complexity Minutes (47037) 25   Physical Therapy Goals   PT Frequency 6x/week   PT Predicted Duration/Target Date for Goal Attainment 01/26/24   PT Goals Bed Mobility;Transfers;Gait;Stairs;Aerobic Activity;PT Goal 1;PT Goal 2;PT Goal 3;PT Goal 5;PT Goal 4   PT: Bed Mobility Independent   PT: Transfers Modified independent;Assistive device   PT: Gait Modified independent;Rolling walker;150 feet   PT: Stairs 4 stairs;Modified independent;Rail on left;Rail on right   PT: Perform " aerobic activity with stable cardiovascular response 15 minutes;continuous activity;NuStep   PT: Goal 1 Pt will be SBA for car transfer for safe community mobility   PT: Goal 2 Pt will be Ind with HEP for continued progress between therapies.   PT: Goal 3 Pt will demonstrate Delgadillo Score >45 to demonstrate decreased falls risk at discharge   Therapeutic Procedure/Exercise   Ther. Procedure: strength, endurance, ROM, flexibillity Minutes (32635) 10   Treatment Detail/Skilled Intervention Prolonged passive stretch to B heelcord and hamstrings in supine and seated position.   Neuromuscular Re-education   Neuromuscular Re-Education Minutes (56263) 10   Treatment Detail/Skilled Intervention Pt performed static standing balance with cueing for decreased support and pseudo dyanmic tasks.   PT Discharge Planning   PT Plan PM session to GG car, PUO and stairs; standing balance, stretching, gait   Total Session Time   Timed Code Treatment Minutes 20   Total Session Time (sum of timed and untimed services) 45   Post Acute Settings Only   What unit is patient on? Acute Rehab   Chair/bed-to-chair Transfer   Complete independence for chair-bed-to-chair transfer no   Describe performance SPT with fww needing CGA for safety.   Physical assistance for getting from chair-bed-to-chair Steadying assist for getting from chair-bed-to-chair   Roll Left and Right   Assistance Needed Supervision   Physical Assistance Level No physical assistance   Comment SBA for safety at edge of bed   Roll Left to Right CARE Score 4   Sit to Lying   Assistance Needed Supervision   Physical Assistance Level No physical assistance   Sit to Lying CARE Score 4   Lying to Sitting on Side of Bed   Assistance Needed Supervision   Physical Assistance Level No physical assistance   Lying to Sitting CARE Score 4   Sit to Stand   Physical Assistance Level Contact guard assist   Sitting to Standing CARE Score 4   Locomotion   Assistive Devices Rolling walker    Locomotion Comment Pt was able to amb 150' with fww and CGA>  Pt amb including multiple turns, but needed increased time to complete due to slow mery.   Walk 10 Feet   Assistance Needed Adaptive equipment   Physical Assistance Level Contact guard assist   Walk 10 Ft. CARE Score 4   Walk 50 Feet with Two Turns   Patient Performance Adaptive equipment   Physical Assistance Level Contact guard assist   Walk 50 Ft. CARE Score 4   Walk 150 Feet   Assistance Needed Adaptive equipment   Physical Assistance Level Contact guard assist   Walk 150 Ft. CARE Score 4   Walking 10 Feet on Uneven Surfaces   Assistance Needed Adaptive equipment   Physical Assistance Level Contact guard assist   Walking 10 Feet on Uneven Surfaces CARE Score 4   Wheel 50 Feet with Two Turns   Reason if not Attempted Activity not applicable   Wheel 50 Feet with Two Turns CARE Score 9   Wheel 150 Feet   Reason if not Attempted Activity not applicable   Wheel 150 Feet CARE Score 9

## 2024-01-19 NOTE — PLAN OF CARE
Discharge Planner Post-Acute Rehab PT:     Discharge Plan: ELSO is 8 days () with discharge to home at mod I with 4ww/fww and OP PT    Precautions: Fall Precaution     Current Status:  Bed Mobility: SBA for safety at edge of bed  Transfer: SPT with fww and CGA  Gait: 150' with CGA fww and CGA  Stairs: ABle to complete 4 stairs with CGA and B railings or 1 railing and min A.  Uses R step-to pattern  Balance: SBA for sitting edge of bed > 5mins. CGA for staatic standing balance no UE support. Pt neededs UE support for dyanmic standing balance.     Outcome Measures:   6MWT:  10MWT:  Delgadillo/56 on       Assessment:  Pt presents to ARU following increased hospital stay and prolonged decline in function at home.  Pt limited by functional weakness, clonus, ROM deficits and decreased activity tolerance.  Pt also has increased anxiety surround falls.  Pt motivated to progress back to mod I.    Other Barriers to Discharge (DME, Family Training, etc):   Family Training, 4 CARMEN with 1 railing on either side, fww vs 4ww

## 2024-01-19 NOTE — PLAN OF CARE
Discharge Planner Post-Acute Rehab OT:     Discharge Plan: home with assist for IADLs, HH OT    Precautions: fall    Current Status:  ADLs:  Mobility: CGA with FWW  Grooming: SBA with FWW standing  Dressing: UB: TBD LB: SBA Footwear: TBD  Bathing: TBD  Toileting: CGA with FwW  IADLs: Pt reports typically shares responsibilities. Completed med mgmt task IND.  Vision/Cognition: Would benefit from continued assessment.    Assessment: Pt is a 62 y/o female presenting to ARU with deficits in balance, strength, endurance impacting engagement/safety with ADLs/IADLs. ELOS 7 days with goal for Mod I for ADLs and assist for IADLs from family.     Other Barriers to Discharge (DME, Family Training, etc): Family training, will not have assist during the day

## 2024-01-19 NOTE — PLAN OF CARE
Goal Outcome Evaluation:    Overall Patient Progress: no change    Outcome Evaluation: No change in Pt progress this shift.    Pt is alert and oriented. Can take pills whole. Continent of bladder. One PVR completed. LBM 1/18. Ax1 walker. Denied pain, SOB, CP, and n/t. BG was 130. VSS. Port-a-cath saline flushed. Call light within reach and bed alarms on. Pt is able to make needs known and slept for majority of the shift. Will continue with POC.

## 2024-01-19 NOTE — PLAN OF CARE
Goal Outcome Evaluation:      Plan of Care Reviewed With: patient    Overall Patient Progress: no changeOverall Patient Progress: no change    Outcome Evaluation: Patient alert and oriented x4. Assist of one with walker for transfers/ambulation. regular diet. incont of BM this shift due to loose stools. continue with PVR's. Last . patient is using call light to make needs known    Blood glu 269 (patient ate meal 1 hour prior), 207. Port-a-cath to L chest. Loose stool x2. Denies cramping, abdominal pain, nausea, dizziness, lighthead, headache, spasms.

## 2024-01-20 ENCOUNTER — APPOINTMENT (OUTPATIENT)
Dept: OCCUPATIONAL THERAPY | Facility: CLINIC | Age: 64
End: 2024-01-20
Attending: PHYSICAL MEDICINE & REHABILITATION
Payer: COMMERCIAL

## 2024-01-20 ENCOUNTER — APPOINTMENT (OUTPATIENT)
Dept: PHYSICAL THERAPY | Facility: CLINIC | Age: 64
End: 2024-01-20
Attending: PHYSICAL MEDICINE & REHABILITATION
Payer: COMMERCIAL

## 2024-01-20 LAB
GLUCOSE BLDC GLUCOMTR-MCNC: 107 MG/DL (ref 70–99)
GLUCOSE BLDC GLUCOMTR-MCNC: 131 MG/DL (ref 70–99)
GLUCOSE BLDC GLUCOMTR-MCNC: 157 MG/DL (ref 70–99)
GLUCOSE BLDC GLUCOMTR-MCNC: 202 MG/DL (ref 70–99)
GLUCOSE BLDC GLUCOMTR-MCNC: 94 MG/DL (ref 70–99)

## 2024-01-20 PROCEDURE — 97530 THERAPEUTIC ACTIVITIES: CPT | Mod: GP

## 2024-01-20 PROCEDURE — 97116 GAIT TRAINING THERAPY: CPT | Mod: GP | Performed by: PHYSICAL THERAPIST

## 2024-01-20 PROCEDURE — 97535 SELF CARE MNGMENT TRAINING: CPT | Mod: GO

## 2024-01-20 PROCEDURE — 128N000003 HC R&B REHAB

## 2024-01-20 PROCEDURE — 250N000013 HC RX MED GY IP 250 OP 250 PS 637

## 2024-01-20 PROCEDURE — 250N000011 HC RX IP 250 OP 636

## 2024-01-20 PROCEDURE — 250N000012 HC RX MED GY IP 250 OP 636 PS 637

## 2024-01-20 PROCEDURE — 97112 NEUROMUSCULAR REEDUCATION: CPT | Mod: GP | Performed by: PHYSICAL THERAPIST

## 2024-01-20 RX ADMIN — CLONAZEPAM 0.5 MG: 0.5 TABLET ORAL at 08:36

## 2024-01-20 RX ADMIN — PREDNISONE 60 MG: 20 TABLET ORAL at 08:35

## 2024-01-20 RX ADMIN — FOLIC ACID 1000 MCG: 1 TABLET ORAL at 08:36

## 2024-01-20 RX ADMIN — POLYETHYLENE GLYCOL 3350 17 G: 17 POWDER, FOR SOLUTION ORAL at 08:35

## 2024-01-20 RX ADMIN — Medication 50 MCG: at 08:36

## 2024-01-20 RX ADMIN — CALCIUM 500 MG: 500 TABLET ORAL at 18:18

## 2024-01-20 RX ADMIN — CALCIUM 500 MG: 500 TABLET ORAL at 08:36

## 2024-01-20 RX ADMIN — POLYETHYLENE GLYCOL 3350 17 G: 17 POWDER, FOR SOLUTION ORAL at 20:07

## 2024-01-20 RX ADMIN — LEVOTHYROXINE SODIUM 50 MCG: 50 TABLET ORAL at 08:35

## 2024-01-20 RX ADMIN — CITALOPRAM HYDROBROMIDE 20 MG: 20 TABLET ORAL at 08:36

## 2024-01-20 RX ADMIN — POTASSIUM CHLORIDE 10 MEQ: 750 CAPSULE, EXTENDED RELEASE ORAL at 20:07

## 2024-01-20 RX ADMIN — INSULIN ASPART 1 UNITS: 100 INJECTION, SOLUTION INTRAVENOUS; SUBCUTANEOUS at 18:18

## 2024-01-20 RX ADMIN — PANTOPRAZOLE SODIUM 40 MG: 40 TABLET, DELAYED RELEASE ORAL at 05:58

## 2024-01-20 RX ADMIN — SENNOSIDES 1 TABLET: 8.6 TABLET, FILM COATED ORAL at 08:36

## 2024-01-20 RX ADMIN — ENOXAPARIN SODIUM 40 MG: 40 INJECTION SUBCUTANEOUS at 14:17

## 2024-01-20 RX ADMIN — CLONAZEPAM 1 MG: 0.5 TABLET ORAL at 20:07

## 2024-01-20 RX ADMIN — POTASSIUM CHLORIDE 10 MEQ: 750 CAPSULE, EXTENDED RELEASE ORAL at 08:35

## 2024-01-20 ASSESSMENT — ACTIVITIES OF DAILY LIVING (ADL)
ADLS_ACUITY_SCORE: 31

## 2024-01-20 NOTE — PLAN OF CARE
Discharge Planner Post-Acute Rehab PT:     Discharge Plan: ELSO is 8 days () with discharge to home at mod I with 4ww/fww and OP PT    Precautions: Fall Precaution     Current Status:  Bed Mobility: SBA for safety at edge of bed  Transfer: SPT with fww and CGA  Gait: 150' with CGA fww and CGA  Stairs: ABle to complete 4 stairs with CGA and B railings or 1 railing and min A.  Uses R step-to pattern  Balance: SBA for sitting edge of bed > 5mins. CGA for staatic standing balance no UE support. Pt neededs UE support for dyanmic standing balance.     Outcome Measures:   6MWT:  10MWT:  Delgadillo/56 on       Assessment:  Pt demonstrates improvement with balance confidence and postural control with decreased anxiety around falls, progressing through neuro re-ed focusing on stability with single leg stance, gait speed, and decreased reliance on UE support.    Other Barriers to Discharge (DME, Family Training, etc):   Family Training, 4 CARMEN with 1 railing on either side, fww vs 4ww

## 2024-01-20 NOTE — PROGRESS NOTES
VS: VSS   O2: 98 5 AR RA   Output: SEE I AND O FLOW SHEET   Last BM: 1/19/24   Activity: ASSIST OF 1 WITH WALKER   Skin: SKIN IS INTACT EXCEPT PORT A CATH   Pain: DENIES PAIN   CMS: INTACT   Dressing: DRESSING TO THE PORT A CATH IN LEFT CHEST   Diet: REGULAR/ THIN   LDA: PORT A CATH IN LEFT CHEST   Equipment: T BELT AND A WALKER, WHEEL CHAIR   Plan: WILL CONTINUE WITH CURRENT PLAN OF CARE   Additional Info:

## 2024-01-20 NOTE — PLAN OF CARE
Discharge Planner Post-Acute Rehab OT:     Discharge Plan: home with assist for IADLs, HH OT    Precautions: fall    Current Status:  ADLs:  Mobility: CGA with FWW  Grooming: SBA with FWW standing  Dressing: UB: SBA LB: SBA Footwear: SBA  Bathing: SBA seated on shower chair  Toileting: CGA with FWW  IADLs: Pt reports typically shares responsibilities. Completed med mgmt task IND.  Vision/Cognition: Would benefit from continued assessment.    Assessment: Completed GG shower, pt requiring 1 VC for safety, limited primarily by fatigue.    Other Barriers to Discharge (DME, Family Training, etc): Family training, will not have assist during the day

## 2024-01-20 NOTE — PLAN OF CARE
Goal Outcome Evaluation:    Overall Patient Progress: no change    Outcome Evaluation: No change in Pt progress this shift.    Pt is alert and oriented. Can take pills whole. Continent of B&B. PVRs completed this shift. LBM 1/19. Ax1 walker. Denied pain. Breathing is WDL. BG was 131. Call light within reach and bed alarms on. Pt slept through majority of the shift. Will continue with POC.

## 2024-01-20 NOTE — PLAN OF CARE
Goal Outcome Evaluation:      Plan of Care Reviewed With: patient    Overall Patient Progress: no change    Orientation: AOX4  Bowel:Continent LBM: 1/19  Bladder: Continent; still need PVRx1  Pain: denied  Ambulation/Transfers: A1 with walker and gaitbelt  Blood sugars: 212 and 173  Diet/Liquids: Tolerating diet well; good appetite noted  Tubes/Lines/Drains: port a cath on left upper chest; dressing and needle changed  Skin:Intact    Uses call light appropriately. Able to make her needs known, No care concern at this time. Continue POC

## 2024-01-20 NOTE — PROGRESS NOTES
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 ELOS 7 days.    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

## 2024-01-21 ENCOUNTER — APPOINTMENT (OUTPATIENT)
Dept: OCCUPATIONAL THERAPY | Facility: CLINIC | Age: 64
End: 2024-01-21
Attending: PHYSICAL MEDICINE & REHABILITATION
Payer: COMMERCIAL

## 2024-01-21 LAB
GLUCOSE BLDC GLUCOMTR-MCNC: 107 MG/DL (ref 70–99)
GLUCOSE BLDC GLUCOMTR-MCNC: 143 MG/DL (ref 70–99)
GLUCOSE BLDC GLUCOMTR-MCNC: 206 MG/DL (ref 70–99)
GLUCOSE BLDC GLUCOMTR-MCNC: 213 MG/DL (ref 70–99)
GLUCOSE BLDC GLUCOMTR-MCNC: 87 MG/DL (ref 70–99)
PLATELET # BLD AUTO: 375 10E3/UL (ref 150–450)

## 2024-01-21 PROCEDURE — 85049 AUTOMATED PLATELET COUNT: CPT

## 2024-01-21 PROCEDURE — 250N000011 HC RX IP 250 OP 636

## 2024-01-21 PROCEDURE — 36592 COLLECT BLOOD FROM PICC: CPT

## 2024-01-21 PROCEDURE — 99231 SBSQ HOSP IP/OBS SF/LOW 25: CPT | Mod: GC | Performed by: PHYSICAL MEDICINE & REHABILITATION

## 2024-01-21 PROCEDURE — 128N000003 HC R&B REHAB

## 2024-01-21 PROCEDURE — 250N000012 HC RX MED GY IP 250 OP 636 PS 637

## 2024-01-21 PROCEDURE — 97535 SELF CARE MNGMENT TRAINING: CPT | Mod: GO

## 2024-01-21 PROCEDURE — 250N000013 HC RX MED GY IP 250 OP 250 PS 637

## 2024-01-21 RX ADMIN — POTASSIUM CHLORIDE 10 MEQ: 750 CAPSULE, EXTENDED RELEASE ORAL at 20:54

## 2024-01-21 RX ADMIN — CITALOPRAM HYDROBROMIDE 20 MG: 20 TABLET ORAL at 08:32

## 2024-01-21 RX ADMIN — SENNOSIDES 1 TABLET: 8.6 TABLET, FILM COATED ORAL at 20:54

## 2024-01-21 RX ADMIN — CALCIUM 500 MG: 500 TABLET ORAL at 08:32

## 2024-01-21 RX ADMIN — PREDNISONE 60 MG: 20 TABLET ORAL at 08:32

## 2024-01-21 RX ADMIN — CALCIUM 500 MG: 500 TABLET ORAL at 17:12

## 2024-01-21 RX ADMIN — CLONAZEPAM 0.5 MG: 0.5 TABLET ORAL at 08:31

## 2024-01-21 RX ADMIN — Medication 50 MCG: at 08:32

## 2024-01-21 RX ADMIN — Medication 5 ML: at 12:25

## 2024-01-21 RX ADMIN — FOLIC ACID 1000 MCG: 1 TABLET ORAL at 08:32

## 2024-01-21 RX ADMIN — POTASSIUM CHLORIDE 10 MEQ: 750 CAPSULE, EXTENDED RELEASE ORAL at 08:31

## 2024-01-21 RX ADMIN — PANTOPRAZOLE SODIUM 40 MG: 40 TABLET, DELAYED RELEASE ORAL at 06:22

## 2024-01-21 RX ADMIN — SENNOSIDES 1 TABLET: 8.6 TABLET, FILM COATED ORAL at 08:32

## 2024-01-21 RX ADMIN — POLYETHYLENE GLYCOL 3350 17 G: 17 POWDER, FOR SOLUTION ORAL at 20:52

## 2024-01-21 RX ADMIN — ENOXAPARIN SODIUM 40 MG: 40 INJECTION SUBCUTANEOUS at 17:12

## 2024-01-21 RX ADMIN — INSULIN ASPART 1 UNITS: 100 INJECTION, SOLUTION INTRAVENOUS; SUBCUTANEOUS at 17:12

## 2024-01-21 RX ADMIN — LEVOTHYROXINE SODIUM 50 MCG: 50 TABLET ORAL at 08:32

## 2024-01-21 RX ADMIN — CLONAZEPAM 1 MG: 0.5 TABLET ORAL at 20:53

## 2024-01-21 ASSESSMENT — ACTIVITIES OF DAILY LIVING (ADL)
ADLS_ACUITY_SCORE: 31

## 2024-01-21 NOTE — PLAN OF CARE
Goal Outcome Evaluation:    Overall Patient Progress: no change    Outcome Evaluation: No change in Pt progress this shift.    Pt is alert and oriented. Can take pills whole. Continent of B&B. LBM 1/19. Ax1 walker. Denied pain. BG was 107. Call light within reach and bed alarms on. Pt slept for majority of the shift and is able to make needs known. Will continue with POC.

## 2024-01-21 NOTE — PROGRESS NOTES
"[facility], [place]   Physical Medicine and Rehabilitation Note  Weekend/Holiday coverage           Assessment and Plan of Care:   Shayy Cortez is a 63 year old female with PMH of cutaneous melanoma of the abdomen s/p one year treatment of nivolumab with development of progressive tremor, transient vertigo, abnormal eye movements and gait instability despite cessation of Nivolumab that started in August 2023. 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 Pt completed 5 day course of solumedrol and IVIG w/ improvement in tone, hyperreflexia, and tremor noted.  In addition to managing her neurologic status, she also has required medical mgmt of her urinary retention which is improving, constipation improving, blood sugars management due to high dose steroids and long term taper, pain, and mental health     --No changes today.  --Vitals stable. No labs today.  --Continue ongoing medical management.  --Continue therapies and plan of care.           Interval history:     No events overnight. Denies CP, abdominal pain, or leg pain. Reports slept well last night. Therapies are going well. Reports eating and drinking well.  LBM on 01/21 (small BM per patient). Resting comfortably in bed following therapy session. She continues to feel stronger. She also reports some improvement in her \"brain fogginess\". She remains well motivated.             Physical Exam:     Vitals:    01/20/24 0731 01/20/24 1223 01/20/24 1637 01/21/24 0700   BP: 137/74  129/74 131/71   BP Location: Left arm  Left arm Left arm   Patient Position:    Sitting   Cuff Size:    Adult Regular   Pulse: 84  105 102   Resp: 20  18 16   Temp:  97.8  F (36.6  C) 97.4  F (36.3  C) 98.3  F (36.8  C)   TempSrc:  Oral Oral Oral   SpO2: 98%  97% 98%   Weight:         Gen: NAD, resting in bed  Heart: RRR, no murmurs auscultated  Lungs: clear breath sounds b/l  Abd: soft and non-tender, bowel sounds auscultated  Ext: " wwp, no edema in BLE, non-tender to palpation over bilateral calves  MSK/neuro: alert and oriented, speech fluent, moves BUE and BLE volitionally. No increased tone appreciated in upper or lower extremities.          Data:   Scheduled meds   calcium carbonate 500 mg (elemental)  500 mg Oral BID w/meals    citalopram  20 mg Oral Daily    clonazePAM  0.5 mg Oral Daily    clonazePAM  1 mg Oral At Bedtime    enoxaparin ANTICOAGULANT  40 mg Subcutaneous Q24H    folic acid  1,000 mcg Oral Daily    heparin  5-10 mL Intracatheter Q28 Days    heparin lock flush  5-10 mL Intracatheter Q24H    insulin aspart  1-3 Units Subcutaneous TID AC    insulin aspart  1-3 Units Subcutaneous At Bedtime    levothyroxine  50 mcg Oral Daily    pantoprazole  40 mg Oral QAM AC    polyethylene glycol  17 g Oral BID    potassium chloride ER  10 mEq Oral BID    [START ON 2/22/2024] predniSONE  10 mg Oral Daily    [START ON 2/15/2024] predniSONE  20 mg Oral Daily    [START ON 2/8/2024] predniSONE  30 mg Oral Daily    [START ON 2/1/2024] predniSONE  40 mg Oral Daily    [START ON 2/29/2024] predniSONE  5 mg Oral QAM    [START ON 1/25/2024] predniSONE  50 mg Oral Daily    predniSONE  60 mg Oral Daily    sennosides  1 tablet Oral BID    sodium chloride (PF)  10-20 mL Intracatheter Q28 Days    sulfamethoxazole-trimethoprim  1 tablet Oral Q Mon Wed Fri AM    cholecalciferol  50 mcg Oral Daily       PRN meds:  acetaminophen, clonazePAM, glucose **OR** dextrose **OR** glucagon, heparin lock flush, lidocaine, meclizine, metoclopramide, sodium chloride (PF), sodium chloride (PF)    CBC RESULTS:   Recent Labs   Lab Test 01/19/24  0553   WBC 17.1*   RBC 3.64*   HGB 10.8*   HCT 32.5*   MCV 89   MCH 29.7   MCHC 33.2   RDW 12.8            Last Comprehensive Metabolic Panel:  Lab Results   Component Value Date     01/15/2024    POTASSIUM 3.7 01/15/2024    CHLORIDE 106 01/15/2024    CO2 21 (L) 01/15/2024    ANIONGAP 10 01/15/2024    GLC 87 01/21/2024     BUN 21.3 01/15/2024    CR 0.94 01/18/2024    GFRESTIMATED 69 01/18/2024    BRIANNA 8.8 01/15/2024           Seen and discussed with Dr. Rivas, PM&R staff physician       Trevon Marr DO  PGY2  Physical Medicine and Rehabilitation-[facility]  Pager: [phone]

## 2024-01-21 NOTE — PROGRESS NOTES
Pt discharge date in EPIC is listed as 01/25/24 but PT over weekend documented 01/26/24. Need to confirm.     OT recommending HC and PT recommending OP. Discussed with PT last week and notified that HC is very limited with pt insurance and will have to target OP therapy. At the time, therapist was in agreement. Need to confirm.     Beronica Layton, [facility] Acute Rehab   Direct Phone: [phone]  I   Pager: [phone]  I  Fax: [phone]

## 2024-01-21 NOTE — PLAN OF CARE
FOCUS/GOAL  Pain management, Mobility, and Safety management    ASSESSMENT, INTERVENTIONS AND CONTINUING PLAN FOR GOAL:  Pt alert and oriented; pleasant.  Reports small BM this shift, refused senna but did accept miralax during evening shift.  Reports feeling much better in last few days.  BG was 202 at bedtime, one unit of insulin given.  Pt drank orange juice right before bed.   Continent of bowel and bladder, LBM 1/20.  Goal Outcome Evaluation:

## 2024-01-21 NOTE — PLAN OF CARE
Goal Outcome Evaluation:      VSS /71 (BP Location: Left arm, Patient Position: Sitting, Cuff Size: Adult Regular)   Pulse 102   Temp 98.3  F (36.8  C) (Oral)   Resp 16   Wt 48.9 kg (107 lb 11.2 oz)   SpO2 98%   BMI 19.69 kg/m       O2 RA denies SOB CP   Output Voiding in bathroom adequately    LBM 1/21/24   Activity A1 with walker   Up for meal Yes,    Skin Visible skin intact   Pain Denies pain    Neuro/CMS A&OX4   Dressing CDI    Diet R-thin-whole   LDA Port   Plan Continue POC

## 2024-01-21 NOTE — PLAN OF CARE
Discharge Planner Post-Acute Rehab OT:      Discharge Plan: home with assist for IADLs, HH OT     Precautions: fall     Current Status:  ADLs:  Mobility: SBA with FWW  Grooming: SBA with FWW standing  Dressing: UB: SBA LB: SBA Footwear: SBA  Bathing: SBA seated on shower chair  Toileting: SBA with FWW  IADLs: Pt reports typically shares responsibilities. Completed med mgmt task IND.  Vision/Cognition: Would benefit from continued assessment.     Assessment: pt completing overall ADLs with SBA. Limited from fatigue and shakiness of BLE and requiring frequent rest breaks. Pt is motivated to get stronger.      Other Barriers to Discharge (DME, Family Training, etc): shower stool Family training, will not have assist during the day

## 2024-01-22 ENCOUNTER — APPOINTMENT (OUTPATIENT)
Dept: PHYSICAL THERAPY | Facility: CLINIC | Age: 64
End: 2024-01-22
Attending: PHYSICAL MEDICINE & REHABILITATION
Payer: COMMERCIAL

## 2024-01-22 ENCOUNTER — APPOINTMENT (OUTPATIENT)
Dept: OCCUPATIONAL THERAPY | Facility: CLINIC | Age: 64
End: 2024-01-22
Attending: PHYSICAL MEDICINE & REHABILITATION
Payer: COMMERCIAL

## 2024-01-22 LAB
ANION GAP SERPL CALCULATED.3IONS-SCNC: 12 MMOL/L (ref 7–15)
BASOPHILS # BLD AUTO: ABNORMAL 10*3/UL
BASOPHILS # BLD MANUAL: 0 10E3/UL (ref 0–0.2)
BASOPHILS NFR BLD AUTO: ABNORMAL %
BASOPHILS NFR BLD MANUAL: 0 %
BUN SERPL-MCNC: 31.5 MG/DL (ref 8–23)
CALCIUM SERPL-MCNC: 9.4 MG/DL (ref 8.8–10.2)
CHLORIDE SERPL-SCNC: 102 MMOL/L (ref 98–107)
CREAT SERPL-MCNC: 0.88 MG/DL (ref 0.51–0.95)
DEPRECATED HCO3 PLAS-SCNC: 21 MMOL/L (ref 22–29)
EGFRCR SERPLBLD CKD-EPI 2021: 74 ML/MIN/1.73M2
EOSINOPHIL # BLD AUTO: ABNORMAL 10*3/UL
EOSINOPHIL # BLD MANUAL: 0.2 10E3/UL (ref 0–0.7)
EOSINOPHIL NFR BLD AUTO: ABNORMAL %
EOSINOPHIL NFR BLD MANUAL: 1 %
ERYTHROCYTE [DISTWIDTH] IN BLOOD BY AUTOMATED COUNT: 13.5 % (ref 10–15)
GLUCOSE BLDC GLUCOMTR-MCNC: 165 MG/DL (ref 70–99)
GLUCOSE BLDC GLUCOMTR-MCNC: 210 MG/DL (ref 70–99)
GLUCOSE BLDC GLUCOMTR-MCNC: 219 MG/DL (ref 70–99)
GLUCOSE BLDC GLUCOMTR-MCNC: 86 MG/DL (ref 70–99)
GLUCOSE BLDC GLUCOMTR-MCNC: 92 MG/DL (ref 70–99)
GLUCOSE SERPL-MCNC: 89 MG/DL (ref 70–99)
HCT VFR BLD AUTO: 34.6 % (ref 35–47)
HGB BLD-MCNC: 11.2 G/DL (ref 11.7–15.7)
IMM GRANULOCYTES # BLD: ABNORMAL 10*3/UL
IMM GRANULOCYTES NFR BLD: ABNORMAL %
LYMPHOCYTES # BLD AUTO: ABNORMAL 10*3/UL
LYMPHOCYTES # BLD MANUAL: 3 10E3/UL (ref 0.8–5.3)
LYMPHOCYTES NFR BLD AUTO: ABNORMAL %
LYMPHOCYTES NFR BLD MANUAL: 14 %
MAYO MISC RESULT: NORMAL
MCH RBC QN AUTO: 29.9 PG (ref 26.5–33)
MCHC RBC AUTO-ENTMCNC: 32.4 G/DL (ref 31.5–36.5)
MCV RBC AUTO: 93 FL (ref 78–100)
METAMYELOCYTES # BLD MANUAL: 0.2 10E3/UL
METAMYELOCYTES NFR BLD MANUAL: 1 %
MONOCYTES # BLD AUTO: ABNORMAL 10*3/UL
MONOCYTES # BLD MANUAL: 1.5 10E3/UL (ref 0–1.3)
MONOCYTES NFR BLD AUTO: ABNORMAL %
MONOCYTES NFR BLD MANUAL: 7 %
MYELOCYTES # BLD MANUAL: 0.2 10E3/UL
MYELOCYTES NFR BLD MANUAL: 1 %
NEUTROPHILS # BLD AUTO: ABNORMAL 10*3/UL
NEUTROPHILS # BLD MANUAL: 16.5 10E3/UL (ref 1.6–8.3)
NEUTROPHILS NFR BLD AUTO: ABNORMAL %
NEUTROPHILS NFR BLD MANUAL: 76 %
NRBC # BLD AUTO: 0 10E3/UL
NRBC BLD AUTO-RTO: 0 /100
PLAT MORPH BLD: ABNORMAL
PLATELET # BLD AUTO: 370 10E3/UL (ref 150–450)
POLYCHROMASIA BLD QL SMEAR: SLIGHT
POTASSIUM SERPL-SCNC: 4.2 MMOL/L (ref 3.4–5.3)
RBC # BLD AUTO: 3.74 10E6/UL (ref 3.8–5.2)
RBC MORPH BLD: ABNORMAL
SODIUM SERPL-SCNC: 135 MMOL/L (ref 135–145)
WBC # BLD AUTO: 21.7 10E3/UL (ref 4–11)

## 2024-01-22 PROCEDURE — 80048 BASIC METABOLIC PNL TOTAL CA: CPT

## 2024-01-22 PROCEDURE — 97110 THERAPEUTIC EXERCISES: CPT | Mod: GO

## 2024-01-22 PROCEDURE — 250N000012 HC RX MED GY IP 250 OP 636 PS 637

## 2024-01-22 PROCEDURE — 97530 THERAPEUTIC ACTIVITIES: CPT | Mod: GP | Performed by: PHYSICAL THERAPIST

## 2024-01-22 PROCEDURE — 97112 NEUROMUSCULAR REEDUCATION: CPT | Mod: GP | Performed by: PHYSICAL THERAPIST

## 2024-01-22 PROCEDURE — 85007 BL SMEAR W/DIFF WBC COUNT: CPT

## 2024-01-22 PROCEDURE — 97535 SELF CARE MNGMENT TRAINING: CPT | Mod: GO

## 2024-01-22 PROCEDURE — 36415 COLL VENOUS BLD VENIPUNCTURE: CPT

## 2024-01-22 PROCEDURE — 128N000003 HC R&B REHAB

## 2024-01-22 PROCEDURE — 250N000011 HC RX IP 250 OP 636

## 2024-01-22 PROCEDURE — 250N000013 HC RX MED GY IP 250 OP 250 PS 637

## 2024-01-22 PROCEDURE — 99232 SBSQ HOSP IP/OBS MODERATE 35: CPT | Mod: GC | Performed by: PHYSICAL MEDICINE & REHABILITATION

## 2024-01-22 PROCEDURE — 85027 COMPLETE CBC AUTOMATED: CPT

## 2024-01-22 RX ORDER — PANTOPRAZOLE SODIUM 40 MG/1
40 TABLET, DELAYED RELEASE ORAL
Qty: 30 TABLET | Refills: 0 | Status: SHIPPED | OUTPATIENT
Start: 2024-01-22

## 2024-01-22 RX ORDER — PREDNISONE 10 MG/1
10 TABLET ORAL DAILY
Qty: 7 TABLET | Refills: 0 | Status: SHIPPED | OUTPATIENT
Start: 2024-02-22 | End: 2024-04-01 | Stop reason: DRUGHIGH

## 2024-01-22 RX ORDER — PREDNISONE 5 MG/1
5 TABLET ORAL EVERY MORNING
Qty: 30 TABLET | Refills: 0 | Status: SHIPPED | OUTPATIENT
Start: 2024-02-29 | End: 2024-02-19

## 2024-01-22 RX ORDER — LEVOTHYROXINE SODIUM 50 UG/1
50 TABLET ORAL DAILY
Qty: 30 TABLET | Refills: 0 | Status: SHIPPED | OUTPATIENT
Start: 2024-01-23

## 2024-01-22 RX ORDER — CITALOPRAM HYDROBROMIDE 20 MG/1
20 TABLET ORAL DAILY
Qty: 30 TABLET | Refills: 0 | Status: SHIPPED | OUTPATIENT
Start: 2024-01-22 | End: 2024-02-19

## 2024-01-22 RX ORDER — CALCIUM CARBONATE 500(1250)
500 TABLET ORAL 2 TIMES DAILY WITH MEALS
Qty: 120 TABLET | Refills: 0 | Status: SHIPPED | OUTPATIENT
Start: 2024-01-22

## 2024-01-22 RX ORDER — PREDNISONE 10 MG/1
30 TABLET ORAL DAILY
Qty: 21 TABLET | Refills: 0 | Status: SHIPPED | OUTPATIENT
Start: 2024-02-08 | End: 2024-02-15

## 2024-01-22 RX ORDER — POLYETHYLENE GLYCOL 3350 17 G/17G
17 POWDER, FOR SOLUTION ORAL DAILY
Qty: 510 G | Refills: 0 | Status: SHIPPED | OUTPATIENT
Start: 2024-01-22

## 2024-01-22 RX ORDER — VITAMIN B COMPLEX
50 TABLET ORAL DAILY
Qty: 60 TABLET | Refills: 0 | Status: SHIPPED | OUTPATIENT
Start: 2024-01-23

## 2024-01-22 RX ORDER — PREDNISONE 50 MG/1
50 TABLET ORAL DAILY
Qty: 5 TABLET | Refills: 0 | Status: SHIPPED | OUTPATIENT
Start: 2024-01-27 | End: 2024-02-15

## 2024-01-22 RX ORDER — PREDNISONE 20 MG/1
40 TABLET ORAL DAILY
Qty: 14 TABLET | Refills: 0 | Status: SHIPPED | OUTPATIENT
Start: 2024-02-01 | End: 2024-02-15

## 2024-01-22 RX ORDER — PREDNISONE 20 MG/1
20 TABLET ORAL DAILY
Qty: 7 TABLET | Refills: 0 | Status: SHIPPED | OUTPATIENT
Start: 2024-02-15 | End: 2024-04-01 | Stop reason: DRUGHIGH

## 2024-01-22 RX ORDER — SULFAMETHOXAZOLE/TRIMETHOPRIM 800-160 MG
1 TABLET ORAL
Qty: 8 TABLET | Refills: 0 | Status: SHIPPED | OUTPATIENT
Start: 2024-01-27 | End: 2024-02-15

## 2024-01-22 RX ORDER — FOLIC ACID 1 MG/1
1000 TABLET ORAL DAILY
Qty: 30 TABLET | Refills: 0 | Status: SHIPPED | OUTPATIENT
Start: 2024-01-22

## 2024-01-22 RX ADMIN — CALCIUM 500 MG: 500 TABLET ORAL at 18:11

## 2024-01-22 RX ADMIN — SENNOSIDES 1 TABLET: 8.6 TABLET, FILM COATED ORAL at 20:27

## 2024-01-22 RX ADMIN — ENOXAPARIN SODIUM 40 MG: 40 INJECTION SUBCUTANEOUS at 16:15

## 2024-01-22 RX ADMIN — SENNOSIDES 1 TABLET: 8.6 TABLET, FILM COATED ORAL at 08:05

## 2024-01-22 RX ADMIN — CLONAZEPAM 1 MG: 0.5 TABLET ORAL at 21:15

## 2024-01-22 RX ADMIN — Medication 5 ML: at 12:10

## 2024-01-22 RX ADMIN — INSULIN ASPART 1 UNITS: 100 INJECTION, SOLUTION INTRAVENOUS; SUBCUTANEOUS at 18:11

## 2024-01-22 RX ADMIN — CITALOPRAM HYDROBROMIDE 20 MG: 20 TABLET ORAL at 08:05

## 2024-01-22 RX ADMIN — Medication 50 MCG: at 08:06

## 2024-01-22 RX ADMIN — POTASSIUM CHLORIDE 10 MEQ: 750 CAPSULE, EXTENDED RELEASE ORAL at 08:05

## 2024-01-22 RX ADMIN — LEVOTHYROXINE SODIUM 50 MCG: 50 TABLET ORAL at 08:05

## 2024-01-22 RX ADMIN — POLYETHYLENE GLYCOL 3350 17 G: 17 POWDER, FOR SOLUTION ORAL at 20:27

## 2024-01-22 RX ADMIN — FOLIC ACID 1000 MCG: 1 TABLET ORAL at 09:10

## 2024-01-22 RX ADMIN — CALCIUM 500 MG: 500 TABLET ORAL at 08:05

## 2024-01-22 RX ADMIN — CLONAZEPAM 0.5 MG: 0.5 TABLET ORAL at 08:05

## 2024-01-22 RX ADMIN — PREDNISONE 60 MG: 20 TABLET ORAL at 08:05

## 2024-01-22 RX ADMIN — PANTOPRAZOLE SODIUM 40 MG: 40 TABLET, DELAYED RELEASE ORAL at 06:11

## 2024-01-22 RX ADMIN — SULFAMETHOXAZOLE AND TRIMETHOPRIM 1 TABLET: 800; 160 TABLET ORAL at 08:05

## 2024-01-22 RX ADMIN — POTASSIUM CHLORIDE 10 MEQ: 750 CAPSULE, EXTENDED RELEASE ORAL at 21:15

## 2024-01-22 ASSESSMENT — ACTIVITIES OF DAILY LIVING (ADL)
ADLS_ACUITY_SCORE: 31

## 2024-01-22 NOTE — PLAN OF CARE
Pt is A/O x4, SBA with gait belt, continent of B/B. L port heparin lock, BG x4, pills whole with thin liquid R diet. Continue with POC.

## 2024-01-22 NOTE — CONSULTS
Consulted by Sergio Mora to run a test claim for Blood Glucose Meter/Supplies & Rapid Analog Insulin.    Patient has pharmacy benefits through [facility]. Per insurance, the following are covered and preferred under the patient's plans:     Accu-chek meter/supplies - $0  Contour meter/supplies - $0  Novolog pens/vials - $0  Humalog pens/vials - $0       Please feel free to contact me with any other test claims, prior authorizations, or insurance questions regarding outpatient medications.     Thanks!      Laury Pritchett [facility]  Discharge Pharmacy Liaison  [facility]charge [facility]  Pronouns: She/Her/Hers    Securely message with Qt Software, Epic Secure Chat, or [employer]  Phone: [phone]  Fax: [phone]  [email or web address]

## 2024-01-22 NOTE — PROGRESS NOTES
"  [facility]   Acute Rehabilitation Unit    INTERVAL HISTORY  Notes and labs reviewed over past 24 hours. No acute overnight events reported    Patient was seen and examined resting comfortably in bed. She has no specific acute concerns or complaints and continues to report that she is feeling stronger and her endurance is improving. She also continues to feel her \"brain fog\" is slowly improving. We discussed likelihood of her going home on sliding scale insulin and needing education prior to discharge. She is in agreement with this. Leukocytosis increased to 21 from 17, on longer high dose prednisone taper. Denies dysuria, shortness of breath and remains afebrile so infection suspicion is low.     ROS: 10 point ROS was assessed and was negative unless otherwise stated in HPI      Functionally,    With PT:     Current Status:  Bed Mobility: SBA for safety at edge of bed  Transfer: SPT with fww and CGA  Gait: 150' with CGA fww and CGA  Stairs: ABle to complete 4 stairs with CGA and B railings or 1 railing and min A.  Uses R step-to pattern  Balance: SBA for sitting edge of bed > 5mins. CGA for staatic standing balance no UE support. Pt neededs UE support for dyanmic standing balance.      Outcome Measures:   6MWT:  10MWT:  Delgadillo/56 on     With OT:     Current Status:  ADLs:  Mobility: SBA with FWW  Grooming: SBA with FWW standing  Dressing: UB: SBA LB: SBA Footwear: SBA  Bathing: SBA seated on shower chair  Toileting: SBA with FWW  IADLs: Pt reports typically shares responsibilities. Completed med mgmt task IND.  Vision/Cognition: Would benefit from continued assessment.              MEDICATIONS  Scheduled meds   calcium carbonate 500 mg (elemental)  500 mg Oral BID w/meals    citalopram  20 mg Oral Daily    clonazePAM  0.5 mg Oral Daily    clonazePAM  1 mg Oral At Bedtime    enoxaparin ANTICOAGULANT  40 mg Subcutaneous Q24H    folic acid  1,000 mcg Oral Daily    " heparin  5-10 mL Intracatheter Q28 Days    heparin lock flush  5-10 mL Intracatheter Q24H    insulin aspart  1-3 Units Subcutaneous TID AC    insulin aspart  1-3 Units Subcutaneous At Bedtime    levothyroxine  50 mcg Oral Daily    pantoprazole  40 mg Oral QAM AC    polyethylene glycol  17 g Oral BID    potassium chloride ER  10 mEq Oral BID    [START ON 2/22/2024] predniSONE  10 mg Oral Daily    [START ON 2/15/2024] predniSONE  20 mg Oral Daily    [START ON 2/8/2024] predniSONE  30 mg Oral Daily    [START ON 2/1/2024] predniSONE  40 mg Oral Daily    [START ON 2/29/2024] predniSONE  5 mg Oral QAM    [START ON 1/25/2024] predniSONE  50 mg Oral Daily    predniSONE  60 mg Oral Daily    sennosides  1 tablet Oral BID    sodium chloride (PF)  10-20 mL Intracatheter Q28 Days    sulfamethoxazole-trimethoprim  1 tablet Oral Q Mon Wed Fri AM    cholecalciferol  50 mcg Oral Daily       PRN meds:  acetaminophen, clonazePAM, glucose **OR** dextrose **OR** glucagon, heparin lock flush, lidocaine, meclizine, metoclopramide, sodium chloride (PF), sodium chloride (PF)      PHYSICAL EXAM  /67 (BP Location: Left arm, Patient Position: Semi-Rodrigues's, Cuff Size: Adult Regular)   Pulse 87   Temp 98.3  F (36.8  C) (Oral)   Resp 18   Wt 48.9 kg (107 lb 11.2 oz)   SpO2 96%   BMI 19.69 kg/m    General: in no acute distress, conversational and alert, resting comfortably in bed  HEENT: atraumatic, nares clear, conjunctiva white  Pulmonary: on room air, lungs clear to auscultation bilaterally  Cardiovascular: RRR, no murmur auscultated, well-perfused peripherally  Abdominal: soft, non-tender to palpation, non-distended  Extremities: warm peripherally, no edema in BLEs  Skin: warm, dry, without erythema, ecchymosis, or rash noted  MSK/Neuro: Moves all four extremities volitionally and against gravity. No tremors, resting or intention, noted. No tone appreciated in bilateral upper and lower extremities.       LABS  Results for orders  placed or performed during the hospital encounter of 01/18/24 (from the past 24 hour(s))   Platelet count   Result Value Ref Range    Platelet Count 375 150 - 450 10e3/uL   Glucose by meter   Result Value Ref Range    GLUCOSE BY METER POCT 143 (H) 70 - 99 mg/dL   Glucose by meter   Result Value Ref Range    GLUCOSE BY METER POCT 213 (H) 70 - 99 mg/dL   Glucose by meter   Result Value Ref Range    GLUCOSE BY METER POCT 206 (H) 70 - 99 mg/dL   Glucose by meter   Result Value Ref Range    GLUCOSE BY METER POCT 92 70 - 99 mg/dL   CBC with Platelets & Differential    Narrative    The following orders were created for panel order CBC with Platelets & Differential.  Procedure                               Abnormality         Status                     ---------                               -----------         ------                     CBC with platelets and d...[[id number]]  Abnormal            Final result               Manual Differential[[id number]]          Abnormal            Final result                 Please view results for these tests on the individual orders.   Basic metabolic panel   Result Value Ref Range    Sodium 135 135 - 145 mmol/L    Potassium 4.2 3.4 - 5.3 mmol/L    Chloride 102 98 - 107 mmol/L    Carbon Dioxide (CO2) 21 (L) 22 - 29 mmol/L    Anion Gap 12 7 - 15 mmol/L    Urea Nitrogen 31.5 (H) 8.0 - 23.0 mg/dL    Creatinine 0.88 0.51 - 0.95 mg/dL    GFR Estimate 74 >60 mL/min/1.73m2    Calcium 9.4 8.8 - 10.2 mg/dL    Glucose 89 70 - 99 mg/dL   CBC with platelets and differential   Result Value Ref Range    WBC Count 21.7 (H) 4.0 - 11.0 10e3/uL    RBC Count 3.74 (L) 3.80 - 5.20 10e6/uL    Hemoglobin 11.2 (L) 11.7 - 15.7 g/dL    Hematocrit 34.6 (L) 35.0 - 47.0 %    MCV 93 78 - 100 fL    MCH 29.9 26.5 - 33.0 pg    MCHC 32.4 31.5 - 36.5 g/dL    RDW 13.5 10.0 - 15.0 %    Platelet Count 370 150 - 450 10e3/uL    % Neutrophils      % Lymphocytes      % Monocytes      % Eosinophils      % Basophils      %  Immature Granulocytes      NRBCs per 100 WBC 0 <1 /100    Absolute Neutrophils      Absolute Lymphocytes      Absolute Monocytes      Absolute Eosinophils      Absolute Basophils      Absolute Immature Granulocytes      Absolute NRBCs 0.0 10e3/uL   Manual Differential   Result Value Ref Range    % Neutrophils 76 %    % Lymphocytes 14 %    % Monocytes 7 %    % Eosinophils 1 %    % Basophils 0 %    % Metamyelocytes 1 %    % Myelocytes 1 %    Absolute Neutrophils 16.5 (H) 1.6 - 8.3 10e3/uL    Absolute Lymphocytes 3.0 0.8 - 5.3 10e3/uL    Absolute Monocytes 1.5 (H) 0.0 - 1.3 10e3/uL    Absolute Eosinophils 0.2 0.0 - 0.7 10e3/uL    Absolute Basophils 0.0 0.0 - 0.2 10e3/uL    Absolute Metamyelocytes 0.2 (H) <=0.0 10e3/uL    Absolute Myelocytes 0.2 (H) <=0.0 10e3/uL    RBC Morphology Confirmed RBC Indices     Platelet Assessment  Automated Count Confirmed. Platelet morphology is normal.     Automated Count Confirmed. Platelet morphology is normal.    Polychromasia Slight (A) None Seen   Glucose by meter   Result Value Ref Range    GLUCOSE BY METER POCT 86 70 - 99 mg/dL       ASSESSMENT AND PLAN    Shayy Cortez is a 63 year old female with PMH of cutaneous melanoma of the abdomen s/p one year treatment of nivolumab with development of progressive tremor, transient vertigo, abnormal eye movements and gait instability despite cessation of Nivolumab that started in August 2023. 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 Pt completed 5 day course of solumedrol and IVIG w/ improvement in tone, hyperreflexia, and tremor noted.  In addition to managing her neurologic status, she also has required medical mgmt of her urinary retention which is improving, constipation improving, blood sugars management due to high dose steroids and long term taper, pain, and mental health        Admission to acute inpatient rehab 01/18.    Impairment group code: Neurologic Conditions 03.9 Other Neurologic: Stiff-Person  Syndrome         PT, OT and SLP 90 minutes of each 6 days per week, in addition to rehab nursing and close management of physiatrist.       Impairment of ADL's:  OT for 90 minutes 6 days per week to work on ADL re-training such as grooming, self cares and bathing.       Impairment of mobility:  PT for 90 minutes 6 days per week to work on neuromuscular re-education focusing on strength, balance, coordination, and endurance.          Rehab RN for med administration, bowel regimen, glucose monitoring and wound care.         Medical Conditions  #Concern for stiff person syndrome vs PERM vs antibody negative autoimmune process.   #Spastic gait   #Full body postural tremors   #Square wave jerks  She has a primary neurologist, Dr. Delcid in [facility]. She developed symptoms in August of headaches, dizziness, uncontrolled tremors and mild confusion. MRI brain and CTA head and neck unrevealing.During this hospitalization, patient completed IVIG 20g 5/5 dose 1/10/23-1/14/23 and Solumedrol 1000mg 5/5 dose (last on 1/15/23) alongside clonazepam   -PT and OT  -Continue Clonazepam 0.5mg in AM, 1mg at bedtime and 0.5mg daily PRN. Has yet to need PRN dose as of 1/22  -Continue with prednisone taper               --60mg for seven total doses (ending 1/24). Then decrease by 10mg every 7 days (ending 2/28). Will then continue PTA chronic 5mg prednisone daily.   -Continue prophylactic bactrim for 35 total days (Final dose 2/16) while on high dose steroids  -Vit D, calcium supplementation and protonix for 35 days during steroid taper.   -Referral placed for outpatient neuroimmunology for potential initiation of pulse IVIG.   - ESTEPHANIA 65, DPPX antibodies. Appear negative as of 1/22     #Rheumatoid Arthritis  -Steroid taper and treatment as directed above.      #Pain  -Tyelonol and lidocaine patches as needed.  -Patient without increased tone at this time. Can consider baclofen if muscle spasticity or rigidity develop and pain  increases.      #Steroid induced hyperglycemia   Have been requiring 2-3 units per day inconsistently.   -Will continue with sliding scale insulin while on high dose prednisone tape. Anticipate discharging with this  -Diabetes education consult ordered.      #Leukocytosis   #Anemia  Leukocytosis likely due to steroid. Patient remains without signs/symptoms of infection.   Anemia seems chronic but stable.   -Will monitor clinically and check weekly      # Moderate Cervical Spinal Canal Stenosis of C3 - C7  Incidental finding on C-Spine MRI. MRI without cord signal change so less likely myelopathy from cervical stenosis. May consider repeat MRI and neurosurgery referral in future if symptoms don't improve with steroid treatment.      # Anxiety  # Depression  - Continue with 20mg Citalopram. Previously on 40mg PTA. Consider increasing to PTA dosing during ARU stay.  -Patient continues to deny desire at this time for clinical psychology at this time while at the ARU.      #Hypothyroidism  -Continue PTA levothyroxine      #Bladder  #Urinary Retention, resolved  Patient had urinary retention requiring keita catheter in acute setting.   -Patent voiding on her own volition without any complications.   -PVR X2 negative upon admission. Bladder protocol in place.      #Bowel  Patient had issues of constipation in acute setting.   -Bowel movements becoming more regular, but still small amount per patient.  -Continue schedule miralax and Senokot BID.            Adjustment to disability:  Patient denied desire at this time for clinical psychology at this time while at the ARU.   FEN: Regular diet with thin liquids  DVT Prophylaxis: Lovenox until ambulating community distance. Discontinue upon discharge.   GI Prophylaxis: Protonix while on steroid taper  Code: Full, confirmed on admission  Disposition: Home  ELOS:  8 days.  Rehab prognosis:  Good  Follow up Appointments on Discharge:   Neuroimmunology  Neurology follow up with  [facility] providers  PCP  PM&R      Seen and discussed with Dr. Rivas, PM&R staff physician       Trevon Marr, DO  PGY2  Physical Medicine and Rehabilitation-[facility]  Pager: [phone]

## 2024-01-22 NOTE — PLAN OF CARE
"Discharge Planner Post-Acute Rehab PT:      Discharge Plan: Home with OP PT ([facility])      Precautions: Falls     Current Status:  Bed Mobility: IND  Transfer: Mod I with 4WW  Gait: Mod I with 4WW, 500+ ft  Stairs: 12x8\" steps with bilat rails, distant supervision  Balance: Fair static, dynamic standing balance w/out UE support     Outcome Measures:   Delgadillo (1/19): 24/56     Assessment: In collaboration with OT, upgraded to mod I with 4WW. Nearing to met for most goals, including IND floor<>EOM transfer as part of fall recovery training. Would benefit from ongoing static, dynamic standing balance training incorporating central vestibular habituation/head mvmts to reduce risk of future falls.     Other Barriers to Discharge (DME, Family Training, etc):   Pt has a 4WW  Notes it would be easiest to have   Friday AM's.  "

## 2024-01-22 NOTE — PLAN OF CARE
Goal Outcome Evaluation:    Overall Patient Progress: no change    Outcome Evaluation: No change in Pt progress this shift.    Pt is alert and oriented. Can take pills whole. Continent of bladder. LBM 1/21. Ax1 walker. Denied pain. BG was 92. Call light within reach and bed alarms on. Pt is able to make needs known and slept through majority of the shift. Will continue with POC.

## 2024-01-22 NOTE — PLAN OF CARE
Discharge Planner Post-Acute Rehab OT:      Discharge Plan: home with assist for IADLs, HH OT     Precautions: fall     Current Status:  ADLs:  Mobility: SBA with FWW  Grooming: SBA with FWW standing  Dressing: UB: SBA LB: SBA Footwear: SBA  Bathing: SBA seated on shower chair  Toileting: SBA with FWW  IADLs: Pt reports typically shares responsibilities. Completed med mgmt task IND.  Vision/Cognition: Would benefit from continued assessment.     Assessment: Facilitated morning ADL routine w/ focus on safety with fww mgmt within room. Pt completing all ADL primarily w/ SBA. In collaboration w/ pt and PT, progressed pt to mod-I in room, discussed calling for assist if feeling unsteady or unsafe, pt verbalized understanding. Pt motivated and progressing well towards goals.     Other Barriers to Discharge (DME, Family Training, etc): shower stool Family training, will not have assist during the day while S/O is working

## 2024-01-23 ENCOUNTER — APPOINTMENT (OUTPATIENT)
Dept: OCCUPATIONAL THERAPY | Facility: CLINIC | Age: 64
End: 2024-01-23
Attending: PHYSICAL MEDICINE & REHABILITATION
Payer: COMMERCIAL

## 2024-01-23 ENCOUNTER — APPOINTMENT (OUTPATIENT)
Dept: PHYSICAL THERAPY | Facility: CLINIC | Age: 64
End: 2024-01-23
Attending: PHYSICAL MEDICINE & REHABILITATION
Payer: COMMERCIAL

## 2024-01-23 LAB
GLUCOSE BLDC GLUCOMTR-MCNC: 101 MG/DL (ref 70–99)
GLUCOSE BLDC GLUCOMTR-MCNC: 111 MG/DL (ref 70–99)
GLUCOSE BLDC GLUCOMTR-MCNC: 145 MG/DL (ref 70–99)
GLUCOSE BLDC GLUCOMTR-MCNC: 164 MG/DL (ref 70–99)
GLUCOSE BLDC GLUCOMTR-MCNC: 87 MG/DL (ref 70–99)
MAYO MISC RESULT: ABNORMAL

## 2024-01-23 PROCEDURE — 128N000003 HC R&B REHAB

## 2024-01-23 PROCEDURE — 250N000012 HC RX MED GY IP 250 OP 636 PS 637

## 2024-01-23 PROCEDURE — 99232 SBSQ HOSP IP/OBS MODERATE 35: CPT | Mod: GC | Performed by: PHYSICAL MEDICINE & REHABILITATION

## 2024-01-23 PROCEDURE — 97535 SELF CARE MNGMENT TRAINING: CPT | Mod: GO

## 2024-01-23 PROCEDURE — 250N000013 HC RX MED GY IP 250 OP 250 PS 637

## 2024-01-23 PROCEDURE — 250N000011 HC RX IP 250 OP 636

## 2024-01-23 PROCEDURE — 97112 NEUROMUSCULAR REEDUCATION: CPT | Mod: GP | Performed by: PHYSICAL THERAPIST

## 2024-01-23 PROCEDURE — 97530 THERAPEUTIC ACTIVITIES: CPT | Mod: GO

## 2024-01-23 RX ORDER — CONTAINER,EMPTY
EACH MISCELLANEOUS
Qty: 1 EACH | Refills: 0 | Status: SHIPPED | OUTPATIENT
Start: 2024-01-23 | End: 2024-04-01

## 2024-01-23 RX ORDER — BLOOD PRESSURE TEST KIT
KIT MISCELLANEOUS
Qty: 100 EACH | Refills: 0 | Status: SHIPPED | OUTPATIENT
Start: 2024-01-23 | End: 2024-04-01

## 2024-01-23 RX ADMIN — ENOXAPARIN SODIUM 40 MG: 40 INJECTION SUBCUTANEOUS at 16:37

## 2024-01-23 RX ADMIN — CLONAZEPAM 0.5 MG: 0.5 TABLET ORAL at 07:48

## 2024-01-23 RX ADMIN — CLONAZEPAM 1 MG: 0.5 TABLET ORAL at 21:08

## 2024-01-23 RX ADMIN — CALCIUM 500 MG: 500 TABLET ORAL at 16:58

## 2024-01-23 RX ADMIN — POTASSIUM CHLORIDE 10 MEQ: 750 CAPSULE, EXTENDED RELEASE ORAL at 21:09

## 2024-01-23 RX ADMIN — LEVOTHYROXINE SODIUM 50 MCG: 50 TABLET ORAL at 07:48

## 2024-01-23 RX ADMIN — Medication 50 MCG: at 07:49

## 2024-01-23 RX ADMIN — POLYETHYLENE GLYCOL 3350 17 G: 17 POWDER, FOR SOLUTION ORAL at 21:07

## 2024-01-23 RX ADMIN — CALCIUM 500 MG: 500 TABLET ORAL at 07:48

## 2024-01-23 RX ADMIN — Medication 5 ML: at 12:03

## 2024-01-23 RX ADMIN — PANTOPRAZOLE SODIUM 40 MG: 40 TABLET, DELAYED RELEASE ORAL at 07:48

## 2024-01-23 RX ADMIN — POTASSIUM CHLORIDE 10 MEQ: 750 CAPSULE, EXTENDED RELEASE ORAL at 07:48

## 2024-01-23 RX ADMIN — SENNOSIDES 1 TABLET: 8.6 TABLET, FILM COATED ORAL at 07:49

## 2024-01-23 RX ADMIN — INSULIN ASPART 1 UNITS: 100 INJECTION, SOLUTION INTRAVENOUS; SUBCUTANEOUS at 16:43

## 2024-01-23 RX ADMIN — FOLIC ACID 1000 MCG: 1 TABLET ORAL at 07:48

## 2024-01-23 RX ADMIN — PREDNISONE 60 MG: 20 TABLET ORAL at 07:47

## 2024-01-23 RX ADMIN — CITALOPRAM HYDROBROMIDE 20 MG: 20 TABLET ORAL at 07:48

## 2024-01-23 RX ADMIN — SENNOSIDES 1 TABLET: 8.6 TABLET, FILM COATED ORAL at 21:10

## 2024-01-23 ASSESSMENT — ACTIVITIES OF DAILY LIVING (ADL)
ADLS_ACUITY_SCORE: 31

## 2024-01-23 NOTE — PROGRESS NOTES
The student's charting has been reviewed for this clinical shift.  Richie Cardoza MA, RN, CCRN  Professor, [facility]

## 2024-01-23 NOTE — PROGRESS NOTES
"  [facility]   Acute Rehabilitation Unit    INTERVAL HISTORY  Notes and labs reviewed over past 24 hours. No acute overnight events reported    Patient was seen and examined working on MAP program. She reports she is slightly more \"shaky\" this morning. She denies any increased stiffness, or pain. She reports no other acute concerns or complaints. Reminded her she has PRN Clonazapam once daily if tremors persist throughout day and interfere with therapies.     ROS: 10 point ROS was assessed and was negative unless otherwise stated in HPI      Functionally,    With PT: 1/22    Current Status:  Bed Mobility: IND  Transfer: Mod I with 4WW  Gait: Mod I with 4WW, 500+ ft  Stairs: 12x8\" steps with bilat rails, distant supervision  Balance: Fair static, dynamic standing balance w/out UE support     Outcome Measures:   Delg[place]illo (1/19): 24/56    With OT: 1/22    Current Status:  ADLs:  Mobility: SBA with FWW  Grooming: SBA with FWW standing  Dressing: UB: SBA LB: SBA Footwear: SBA  Bathing: SBA seated on shower chair  Toileting: SBA with FWW  IADLs: Pt reports typically shares responsibilities. Completed med mgmt task IND.  Vision/Cognition: Would benefit from continued assessment.              MEDICATIONS  Scheduled meds   - Medication Assessment Program - Rehab Services   Does not apply See Admin Instructions    calcium carbonate 500 mg (elemental)  500 mg Oral BID w/meals    citalopram  20 mg Oral Daily    clonazePAM  0.5 mg Oral Daily    clonazePAM  1 mg Oral At Bedtime    enoxaparin ANTICOAGULANT  40 mg Subcutaneous Q24H    folic acid  1,000 mcg Oral Daily    heparin  5-10 mL Intracatheter Q28 Days    heparin lock flush  5-10 mL Intracatheter Q24H    insulin aspart  1-3 Units Subcutaneous TID AC    insulin aspart  1-3 Units Subcutaneous At Bedtime    levothyroxine  50 mcg Oral Daily    pantoprazole  40 mg Oral QAM AC    polyethylene glycol  17 g Oral BID    potassium chloride ER  10 mEq " Oral BID    [START ON 2/22/2024] predniSONE  10 mg Oral Daily    [START ON 2/15/2024] predniSONE  20 mg Oral Daily    [START ON 2/8/2024] predniSONE  30 mg Oral Daily    [START ON 2/1/2024] predniSONE  40 mg Oral Daily    [START ON 2/29/2024] predniSONE  5 mg Oral QAM    [START ON 1/25/2024] predniSONE  50 mg Oral Daily    predniSONE  60 mg Oral Daily    sennosides  1 tablet Oral BID    sodium chloride (PF)  10-20 mL Intracatheter Q28 Days    sulfamethoxazole-trimethoprim  1 tablet Oral Q Mon Wed Fri AM    cholecalciferol  50 mcg Oral Daily       PRN meds:  acetaminophen, clonazePAM, glucose **OR** dextrose **OR** glucagon, heparin lock flush, lidocaine, meclizine, metoclopramide, sodium chloride (PF), sodium chloride (PF)      PHYSICAL EXAM  BP (!) 140/71 (BP Location: Left arm)   Pulse (!) 127   Temp 97.9  F (36.6  C) (Oral)   Resp 16   Wt 48.9 kg (107 lb 11.2 oz)   SpO2 99%   BMI 19.69 kg/m    General: in no acute distress, conversational and alert, resting comfortably in bed  HEENT: atraumatic, nares clear, conjunctiva white  Pulmonary: on room air, lungs clear to auscultation bilaterally  Cardiovascular: RRR, no murmur auscultated, well-perfused peripherally  Abdominal: soft, non-tender to palpation, non-distended  Extremities: warm peripherally, no edema in BLEs  Skin: warm, dry, without erythema, ecchymosis, or rash noted  MSK/Neuro: Moves all four extremities volitionally and against gravity. Mild resting tremor noted in bilateral hands, at rest and with intention. Remains without increased tone in bilateral upper and lower extremities.       LABS  Results for orders placed or performed during the hospital encounter of 01/18/24 (from the past 24 hour(s))   Glucose by meter   Result Value Ref Range    GLUCOSE BY METER POCT 210 (H) 70 - 99 mg/dL   Glucose by meter   Result Value Ref Range    GLUCOSE BY METER POCT 165 (H) 70 - 99 mg/dL   Glucose by meter   Result Value Ref Range    GLUCOSE BY METER POCT  219 (H) 70 - 99 mg/dL   Glucose by meter   Result Value Ref Range    GLUCOSE BY METER POCT 87 70 - 99 mg/dL   Glucose by meter   Result Value Ref Range    GLUCOSE BY METER POCT 101 (H) 70 - 99 mg/dL       ASSESSMENT AND PLAN    Shayy Cortez is a 63 year old female with PMH of cutaneous melanoma of the abdomen s/p one year treatment of nivolumab with development of progressive tremor, transient vertigo, abnormal eye movements and gait instability despite cessation of Nivolumab that started in August 2023. 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 Pt completed 5 day course of solumedrol and IVIG w/ improvement in tone, hyperreflexia, and tremor noted.  In addition to managing her neurologic status, she also has required medical mgmt of her urinary retention which is improving, constipation improving, blood sugars management due to high dose steroids and long term taper, pain, and mental health        Admission to acute inpatient rehab 01/18.    Impairment group code: Neurologic Conditions 03.9 Other Neurologic: Stiff-Person Syndrome         PT, OT and SLP 90 minutes of each 6 days per week, in addition to rehab nursing and close management of physiatrist.       Impairment of ADL's:  OT for 90 minutes 6 days per week to work on ADL re-training such as grooming, self cares and bathing.       Impairment of mobility:  PT for 90 minutes 6 days per week to work on neuromuscular re-education focusing on strength, balance, coordination, and endurance.          Rehab RN for med administration, bowel regimen, glucose monitoring and wound care.         Medical Conditions  #Concern for stiff person syndrome vs PERM vs antibody negative autoimmune process.   #Spastic gait   #Full body postural tremors   #Square wave jerks  She has a primary neurologist, Dr. Delcid in [facility]. She developed symptoms in August of headaches, dizziness, uncontrolled tremors and mild confusion. MRI brain and CTA head  and neck unrevealing.During this hospitalization, patient completed IVIG 20g 5/5 dose 1/10/23-1/14/23 and Solumedrol 1000mg 5/5 dose (last on 1/15/23) alongside clonazepam   -PT and OT  -Continue Clonazepam 0.5mg in AM, 1mg at bedtime and 0.5mg daily PRN. 1/23 Encouraged PRN dose if tremors interfering with therapies or ADLs  -Continue with prednisone taper               --60mg for seven total doses (ending 1/24). Then decrease by 10mg every 7 days (ending 2/28). Will then continue PTA chronic 5mg prednisone daily.   -Continue prophylactic bactrim for 35 total days (Final dose 2/16) while on high dose steroids  -Vit D, calcium supplementation and protonix for 35 days during steroid taper.   -Referral placed for outpatient neuroimmunology for potential initiation of pulse IVIG.   - ESTEPHANIA 65, DPPX antibodies. Appear negative as of 1/22     #Rheumatoid Arthritis  -Steroid taper and treatment as directed above.      #Pain  -Tyelonol and lidocaine patches as needed.  -Patient without increased tone at this time. Can consider baclofen if muscle spasticity or rigidity develop and pain increases.      #Steroid induced hyperglycemia   Have been requiring 2-3 units per day inconsistently.   -Will continue with sliding scale insulin while on high dose prednisone tape. Anticipate discharging with this  -Diabetes education consult ordered.      #Leukocytosis   #Anemia  Leukocytosis likely due to steroid. Patient remains without signs/symptoms of infection.   Anemia seems chronic but stable.   -Will monitor clinically and check weekly      # Moderate Cervical Spinal Canal Stenosis of C3 - C7  Incidental finding on C-Spine MRI. MRI without cord signal change so less likely myelopathy from cervical stenosis. May consider repeat MRI and neurosurgery referral in future if symptoms don't improve with steroid treatment.      # Anxiety  # Depression  - Continue with 20mg Citalopram. Previously on 40mg PTA. Consider increasing to PTA  dosing during ARU stay.  -Patient continues to deny desire at this time for clinical psychology at this time while at the ARU.      #Hypothyroidism  -Continue PTA levothyroxine      #Bladder  #Urinary Retention, resolved  Patient had urinary retention requiring keita catheter in acute setting.   -Patent voiding on her own volition without any complications.   -PVR X2 negative upon admission. Bladder protocol in place.      #Bowel  Patient had issues of constipation in acute setting.   -Bowel movements becoming more regular, but still small amount per patient.  -Continue schedule miralax and Senokot BID.            Adjustment to disability:  Patient denied desire at this time for clinical psychology at this time while at the ARU.   FEN: Regular diet with thin liquids  DVT Prophylaxis: Lovenox until ambulating community distance. Discontinue upon discharge.   GI Prophylaxis: Protonix while on steroid taper  Code: Full, confirmed on admission  Disposition: Home  ELOS:  1/26  Rehab prognosis:  Good  Follow up Appointments on Discharge:   Neuroimmunology  Neurology follow up with [facility]  PCP  PM&R      Seen and discussed with Dr. Rivas, PM&R staff physician       Trevon Marr DO  PGY2  Physical Medicine and Rehabilitation-[facility]  Pager: [phone]

## 2024-01-23 NOTE — PLAN OF CARE
"Discharge Planner Post-Acute Rehab PT:      Discharge Plan: Home with OP PT ([facility])      Precautions: falls     Current Status:  Bed Mobility: IND  Transfer: Mod I with 4WW  Gait: Mod I with 4WW, 500+ ft  Stairs: 12x8\" steps with bilat rails, distant supervision  Balance: Fair static, dynamic standing balance w/out UE support     Outcome Measures:   [place] (1/19): 24/56  Dynamic Visual Acuity (1/23): 4 line loss (>=3 considered abnormal)     Assessment: Initiated HEP for standing balance and central vestibular habituation, with in-depth education provided on nature/basis for intervention, noting increased time and difficulty determining extent of change possible due to central (instead of peripheral) dysfunction. Requires cues and encouragement to increase head speed t/o, but denies dizziness or nausea.     Other Barriers to Discharge (DME, Family Training, etc):   Pt has a 4WW  No family training required from PT perspective.  PTRx HEP provided for standing balance, central vestibular habituation.  "

## 2024-01-23 NOTE — PLAN OF CARE
Discharge Planner Post-Acute Rehab OT:      Discharge Plan: home with assist for IADLs, HH OT     Precautions: fall     Current Status:  ADLs:  Mobility: mod-I FWW in room, SBA in hallways  Grooming: mod-I with FWW standing at sink  Dressing:   UB: mod-I seated EOB;   LB: mod-I sitting EOB, standing w/ 4ww to pull up over hips   Footwear: mod-I seated EOB  Bathing: SBA seated on shower chair  Toileting: SBA with FWW  IADLs: Pt reports typically shares responsibilities w/ S/O. Motivated to return to light household tasks. Completed med mgmt task IND.  Vision/Cognition: Would benefit from continued assessment.     Assessment: Focus on light IADL completion with emphasis on EC/WS strategies and education. Pt demo'ing good safety awareness, identifying need for rest throughout. Pt w/ improved dynamic balance and IND w/ mobility, w/ and w/o 4ww.     Other Barriers to Discharge (DME, Family Training, etc): shower stool   Family training, will not have assist during the day while S/O is working

## 2024-01-23 NOTE — PLAN OF CARE
Goal Outcome Evaluation:      Plan of Care Reviewed With: patient    Overall Patient Progress: improvingOverall Patient Progress: improving    Outcome Evaluation: Pt is alert and oriented x 4. Vitals stable. Didn't eat breakfast but had brunch at 1100. BG elevated at 210 by noon. Dr Marr said ok not to cover because it it postprandial. Progressed to mod I in room with walker. Managed cares independently. Continent of bowel and bladder. She reported having a bm today. Will be discharging in a few days. Ordered to start MAP tomorrow. Med list given at bedside and MAP program explained to her.

## 2024-01-23 NOTE — PLAN OF CARE
Goal Outcome Evaluation:      Plan of Care Reviewed With: patient    Overall Patient Progress: improvingOverall Patient Progress: improving    Outcome Evaluation: Alert and oriented x4. VSS. BG checked at bedtime. SS insulin given. BG check at 0200am; 87mg/dl asymptomatic. Patient drank ginger ale. Patient to start MAP  program; reeducated on the list. Patient continent of bladder. Patient appears asleep during rounds. Safety checks done.

## 2024-01-24 ENCOUNTER — APPOINTMENT (OUTPATIENT)
Dept: OCCUPATIONAL THERAPY | Facility: CLINIC | Age: 64
End: 2024-01-24
Attending: PHYSICAL MEDICINE & REHABILITATION
Payer: COMMERCIAL

## 2024-01-24 ENCOUNTER — APPOINTMENT (OUTPATIENT)
Dept: PHYSICAL THERAPY | Facility: CLINIC | Age: 64
End: 2024-01-24
Attending: PHYSICAL MEDICINE & REHABILITATION
Payer: COMMERCIAL

## 2024-01-24 LAB
BASOPHILS # BLD AUTO: 0 10E3/UL (ref 0–0.2)
BASOPHILS NFR BLD AUTO: 0 %
EOSINOPHIL # BLD AUTO: 0.1 10E3/UL (ref 0–0.7)
EOSINOPHIL NFR BLD AUTO: 0 %
ERYTHROCYTE [DISTWIDTH] IN BLOOD BY AUTOMATED COUNT: 13.4 % (ref 10–15)
GLUCOSE BLDC GLUCOMTR-MCNC: 117 MG/DL (ref 70–99)
GLUCOSE BLDC GLUCOMTR-MCNC: 166 MG/DL (ref 70–99)
GLUCOSE BLDC GLUCOMTR-MCNC: 170 MG/DL (ref 70–99)
GLUCOSE BLDC GLUCOMTR-MCNC: 86 MG/DL (ref 70–99)
GLUCOSE BLDC GLUCOMTR-MCNC: 94 MG/DL (ref 70–99)
HCT VFR BLD AUTO: 35.7 % (ref 35–47)
HGB BLD-MCNC: 11.9 G/DL (ref 11.7–15.7)
IMM GRANULOCYTES # BLD: 0.7 10E3/UL
IMM GRANULOCYTES NFR BLD: 4 %
LYMPHOCYTES # BLD AUTO: 3.9 10E3/UL (ref 0.8–5.3)
LYMPHOCYTES NFR BLD AUTO: 24 %
MCH RBC QN AUTO: 30.1 PG (ref 26.5–33)
MCHC RBC AUTO-ENTMCNC: 33.3 G/DL (ref 31.5–36.5)
MCV RBC AUTO: 90 FL (ref 78–100)
MONOCYTES # BLD AUTO: 1.1 10E3/UL (ref 0–1.3)
MONOCYTES NFR BLD AUTO: 7 %
NEUTROPHILS # BLD AUTO: 10.5 10E3/UL (ref 1.6–8.3)
NEUTROPHILS NFR BLD AUTO: 65 %
NRBC # BLD AUTO: 0 10E3/UL
NRBC BLD AUTO-RTO: 0 /100
PLATELET # BLD AUTO: 430 10E3/UL (ref 150–450)
RBC # BLD AUTO: 3.95 10E6/UL (ref 3.8–5.2)
WBC # BLD AUTO: 16.3 10E3/UL (ref 4–11)

## 2024-01-24 PROCEDURE — 128N000003 HC R&B REHAB

## 2024-01-24 PROCEDURE — 97530 THERAPEUTIC ACTIVITIES: CPT | Mod: GO

## 2024-01-24 PROCEDURE — 36415 COLL VENOUS BLD VENIPUNCTURE: CPT

## 2024-01-24 PROCEDURE — 97535 SELF CARE MNGMENT TRAINING: CPT | Mod: GO

## 2024-01-24 PROCEDURE — 85004 AUTOMATED DIFF WBC COUNT: CPT

## 2024-01-24 PROCEDURE — 250N000011 HC RX IP 250 OP 636

## 2024-01-24 PROCEDURE — 97110 THERAPEUTIC EXERCISES: CPT | Mod: GP | Performed by: PHYSICAL THERAPIST

## 2024-01-24 PROCEDURE — 250N000012 HC RX MED GY IP 250 OP 636 PS 637

## 2024-01-24 PROCEDURE — 250N000013 HC RX MED GY IP 250 OP 250 PS 637

## 2024-01-24 PROCEDURE — 99231 SBSQ HOSP IP/OBS SF/LOW 25: CPT | Performed by: PHYSICAL MEDICINE & REHABILITATION

## 2024-01-24 PROCEDURE — 97750 PHYSICAL PERFORMANCE TEST: CPT | Mod: GP | Performed by: PHYSICAL THERAPIST

## 2024-01-24 RX ADMIN — ENOXAPARIN SODIUM 40 MG: 40 INJECTION SUBCUTANEOUS at 16:43

## 2024-01-24 RX ADMIN — CITALOPRAM HYDROBROMIDE 20 MG: 20 TABLET ORAL at 08:27

## 2024-01-24 RX ADMIN — POTASSIUM CHLORIDE 10 MEQ: 750 CAPSULE, EXTENDED RELEASE ORAL at 20:10

## 2024-01-24 RX ADMIN — INSULIN ASPART 1 UNITS: 100 INJECTION, SOLUTION INTRAVENOUS; SUBCUTANEOUS at 12:35

## 2024-01-24 RX ADMIN — CLONAZEPAM 0.5 MG: 0.5 TABLET ORAL at 08:26

## 2024-01-24 RX ADMIN — FOLIC ACID 1000 MCG: 1 TABLET ORAL at 08:27

## 2024-01-24 RX ADMIN — PANTOPRAZOLE SODIUM 40 MG: 40 TABLET, DELAYED RELEASE ORAL at 08:27

## 2024-01-24 RX ADMIN — CALCIUM 500 MG: 500 TABLET ORAL at 18:44

## 2024-01-24 RX ADMIN — Medication 50 MCG: at 08:27

## 2024-01-24 RX ADMIN — Medication 5 ML: at 13:39

## 2024-01-24 RX ADMIN — PREDNISONE 60 MG: 20 TABLET ORAL at 08:26

## 2024-01-24 RX ADMIN — LEVOTHYROXINE SODIUM 50 MCG: 50 TABLET ORAL at 08:27

## 2024-01-24 RX ADMIN — SULFAMETHOXAZOLE AND TRIMETHOPRIM 1 TABLET: 800; 160 TABLET ORAL at 08:28

## 2024-01-24 RX ADMIN — SENNOSIDES 1 TABLET: 8.6 TABLET, FILM COATED ORAL at 20:10

## 2024-01-24 RX ADMIN — CLONAZEPAM 1 MG: 0.5 TABLET ORAL at 20:10

## 2024-01-24 RX ADMIN — SENNOSIDES 1 TABLET: 8.6 TABLET, FILM COATED ORAL at 08:27

## 2024-01-24 RX ADMIN — CALCIUM 500 MG: 500 TABLET ORAL at 08:27

## 2024-01-24 RX ADMIN — POTASSIUM CHLORIDE 10 MEQ: 750 CAPSULE, EXTENDED RELEASE ORAL at 08:27

## 2024-01-24 RX ADMIN — POLYETHYLENE GLYCOL 3350 17 G: 17 POWDER, FOR SOLUTION ORAL at 18:48

## 2024-01-24 ASSESSMENT — ACTIVITIES OF DAILY LIVING (ADL)
ADLS_ACUITY_SCORE: 31

## 2024-01-24 NOTE — PROGRESS NOTES
[facility]   Acute Rehabilitation Unit    INTERVAL HISTORY  Notes and labs reviewed over past 24 hours. No acute overnight events reported    Patient was seen and examined this AM.  No acute issues.  Denies chest pain, shortness of breath, no fever or chills.  Looking forward to discharging home on Friday.  Activity updated to Mod I in the room with the walker.     ROS: 10 point ROS was assessed and was negative unless otherwise stated in HPI      Functional  OT:  ADLs:  Mobility: mod-I FWW in room, SBA in hallways  Grooming: mod-I with FWW standing at sink  Dressing:   UB: mod-I seated EOB;   LB: mod-I sitting EOB, standing w/ 4ww to pull up over hips   Footwear: mod-I seated EOB  Bathing: SBA seated on shower chair  Toileting: SBA with FWW  IADLs: Pt reports typically shares responsibilities w/ S/O. Motivated to return to light household tasks. Completed med mgmt task IND.  Vision/Cognition: Would benefit from continued assessment.           MEDICATIONS  Scheduled meds   - Medication Assessment Program - Rehab Services   Does not apply See Admin Instructions    calcium carbonate 500 mg (elemental)  500 mg Oral BID w/meals    citalopram  20 mg Oral Daily    clonazePAM  0.5 mg Oral Daily    clonazePAM  1 mg Oral At Bedtime    enoxaparin ANTICOAGULANT  40 mg Subcutaneous Q24H    folic acid  1,000 mcg Oral Daily    heparin  5-10 mL Intracatheter Q28 Days    heparin lock flush  5-10 mL Intracatheter Q24H    insulin aspart  1-3 Units Subcutaneous TID AC    insulin aspart  1-3 Units Subcutaneous At Bedtime    levothyroxine  50 mcg Oral Daily    pantoprazole  40 mg Oral QAM AC    polyethylene glycol  17 g Oral BID    potassium chloride ER  10 mEq Oral BID    [START ON 2/22/2024] predniSONE  10 mg Oral Daily    [START ON 2/15/2024] predniSONE  20 mg Oral Daily    [START ON 2/8/2024] predniSONE  30 mg Oral Daily    [START ON 2/1/2024] predniSONE  40 mg Oral Daily    [START ON  2/29/2024] predniSONE  5 mg Oral QAM    [START ON 1/25/2024] predniSONE  50 mg Oral Daily    sennosides  1 tablet Oral BID    sodium chloride (PF)  10-20 mL Intracatheter Q28 Days    sulfamethoxazole-trimethoprim  1 tablet Oral Q Mon Wed Fri AM    cholecalciferol  50 mcg Oral Daily       PRN meds:  acetaminophen, clonazePAM, glucose **OR** dextrose **OR** glucagon, heparin lock flush, lidocaine, meclizine, metoclopramide, sodium chloride (PF), sodium chloride (PF)      PHYSICAL EXAM  /74 (BP Location: Left arm, Patient Position: Semi-Rodrigues's, Cuff Size: Adult Regular)   Pulse 98   Temp 97.8  F (36.6  C) (Oral)   Resp 17   Wt 48.9 kg (107 lb 11.2 oz)   SpO2 99%   BMI 19.69 kg/m    General: in no acute distress, conversational and alert, resting comfortably in bed  HEENT: atraumatic, nares clear, conjunctiva white  Pulmonary: on room air, lungs clear to auscultation bilaterally  Cardiovascular: RRR, no murmur auscultated, well-perfused peripherally  Abdominal: soft, non-tender to palpation, non-distended  Extremities: warm peripherally, no edema in BLEs  Skin: warm, dry, without erythema, ecchymosis, or rash noted  MSK/Neuro: Moves all four extremities volitionally and against gravity. Remains without increased tone in bilateral upper and lower extremities.       LABS  Results for orders placed or performed during the hospital encounter of 01/18/24 (from the past 24 hour(s))   Glucose by meter   Result Value Ref Range    GLUCOSE BY METER POCT 111 (H) 70 - 99 mg/dL   Glucose by meter   Result Value Ref Range    GLUCOSE BY METER POCT 164 (H) 70 - 99 mg/dL   Glucose by meter   Result Value Ref Range    GLUCOSE BY METER POCT 145 (H) 70 - 99 mg/dL   Glucose by meter   Result Value Ref Range    GLUCOSE BY METER POCT 94 70 - 99 mg/dL   CBC with Platelets & Differential    Narrative    The following orders were created for panel order CBC with Platelets & Differential.  Procedure                                Abnormality         Status                     ---------                               -----------         ------                     CBC with platelets and d...[[id number]]  Abnormal            Final result                 Please view results for these tests on the individual orders.   CBC with platelets and differential   Result Value Ref Range    WBC Count 16.3 (H) 4.0 - 11.0 10e3/uL    RBC Count 3.95 3.80 - 5.20 10e6/uL    Hemoglobin 11.9 11.7 - 15.7 g/dL    Hematocrit 35.7 35.0 - 47.0 %    MCV 90 78 - 100 fL    MCH 30.1 26.5 - 33.0 pg    MCHC 33.3 31.5 - 36.5 g/dL    RDW 13.4 10.0 - 15.0 %    Platelet Count 430 150 - 450 10e3/uL    % Neutrophils 65 %    % Lymphocytes 24 %    % Monocytes 7 %    % Eosinophils 0 %    % Basophils 0 %    % Immature Granulocytes 4 %    NRBCs per 100 WBC 0 <1 /100    Absolute Neutrophils 10.5 (H) 1.6 - 8.3 10e3/uL    Absolute Lymphocytes 3.9 0.8 - 5.3 10e3/uL    Absolute Monocytes 1.1 0.0 - 1.3 10e3/uL    Absolute Eosinophils 0.1 0.0 - 0.7 10e3/uL    Absolute Basophils 0.0 0.0 - 0.2 10e3/uL    Absolute Immature Granulocytes 0.7 (H) <=0.4 10e3/uL    Absolute NRBCs 0.0 10e3/uL   Glucose by meter   Result Value Ref Range    GLUCOSE BY METER POCT 86 70 - 99 mg/dL       ASSESSMENT AND PLAN    Shayy Cortez is a 63 year old female with PMH of cutaneous melanoma of the abdomen s/p one year treatment of nivolumab with development of progressive tremor, transient vertigo, abnormal eye movements and gait instability despite cessation of Nivolumab that started in August 2023. 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 Pt completed 5 day course of solumedrol and IVIG w/ improvement in tone, hyperreflexia, and tremor noted.  In addition to managing her neurologic status, she also has required medical mgmt of her urinary retention which is improving, constipation improving, blood sugars management due to high dose steroids and long term taper, pain, and mental health         Admission to acute inpatient rehab 01/18.    Impairment group code: Neurologic Conditions 03.9 Other Neurologic: Stiff-Person Syndrome         PT, OT and SLP 90 minutes of each 6 days per week, in addition to rehab nursing and close management of physiatrist.       Impairment of ADL's:  OT for 90 minutes 6 days per week to work on ADL re-training such as grooming, self cares and bathing.       Impairment of mobility:  PT for 90 minutes 6 days per week to work on neuromuscular re-education focusing on strength, balance, coordination, and endurance.          Rehab RN for med administration, bowel regimen, glucose monitoring and wound care.         Medical Conditions  #Concern for stiff person syndrome vs PERM vs antibody negative autoimmune process.   #Spastic gait   #Full body postural tremors   #Square wave jerks  She has a primary neurologist, Dr. Delcid in [place]. She developed symptoms in August of headaches, dizziness, uncontrolled tremors and mild confusion. MRI brain and CTA head and neck unrevealing.During this hospitalization, patient completed IVIG 20g 5/5 dose 1/10/23-1/14/23 and Solumedrol 1000mg 5/5 dose (last on 1/15/23) alongside clonazepam   -PT and OT  -Continue Clonazepam 0.5mg in AM, 1mg at bedtime and 0.5mg daily PRN. 1/23 Encouraged PRN dose if tremors interfering with therapies or ADLs  -Continue with prednisone taper               --60mg for seven total doses (ending 1/24). Then decrease by 10mg every 7 days (ending 2/28). Will then continue PTA chronic 5mg prednisone daily.   -Continue prophylactic bactrim for 35 total days (Final dose 2/16) while on high dose steroids  -Vit D, calcium supplementation and protonix for 35 days during steroid taper.   -Referral placed for outpatient neuroimmunology for potential initiation of pulse IVIG.   - ESTEPHANIA 65, DPPX antibodies. Appear negative as of 1/22     #Rheumatoid Arthritis  -Steroid taper and treatment as directed above.       #Pain  -Tyelonol and lidocaine patches as needed.  -Patient without increased tone at this time. Can consider baclofen if muscle spasticity or rigidity develop and pain increases.      #Steroid induced hyperglycemia   Have been requiring 2-3 units per day inconsistently.   -Will continue with sliding scale insulin while on high dose prednisone tape. Anticipate discharging with this  -Diabetes education consult ordered.      #Leukocytosis   #Anemia  Leukocytosis likely due to steroid. Patient remains without signs/symptoms of infection.   Anemia seems chronic but stable.   -Will monitor clinically and check weekly      # Moderate Cervical Spinal Canal Stenosis of C3 - C7  Incidental finding on C-Spine MRI. MRI without cord signal change so less likely myelopathy from cervical stenosis. May consider repeat MRI and neurosurgery referral in future if symptoms don't improve with steroid treatment.      # Anxiety  # Depression  - Continue with 20mg Citalopram. Previously on 40mg PTA. Consider increasing to PTA dosing during ARU stay.  -Patient continues to deny desire at this time for clinical psychology at this time while at the ARU.      #Hypothyroidism  -Continue PTA levothyroxine      #Bladder  #Urinary Retention, resolved  Patient had urinary retention requiring keita catheter in acute setting.   -Patent voiding on her own volition without any complications.   -PVR X2 negative upon admission. Bladder protocol in place.      #Bowel  Patient had issues of constipation in acute setting.   -Bowel movements becoming more regular, but still small amount per patient.  -Continue schedule miralax and Senokot BID.            Adjustment to disability:  Patient denied desire at this time for clinical psychology at this time while at the ARU.   FEN: Regular diet with thin liquids  DVT Prophylaxis: Lovenox until ambulating community distance. Discontinue upon discharge.   GI Prophylaxis: Protonix while on steroid taper  Code:  Full, confirmed on admission  Disposition: Home  ELOS:  1/26  Rehab prognosis:  Good  Follow up Appointments on Discharge:   Neuroimmunology  Neurology follow up with [facility]  PCP  PM&R        Emeterio Rivas DO  Physical Medicine and Rehabilitation-[facility]        I spent a total of 25 minutes face to face and coordinating care of Shayy Cortez. 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

## 2024-01-24 NOTE — PLAN OF CARE
Problem: Rehabilitation ([facility]) Plan of Care  Goal: Plan of Care Review  1/23/2024 1837 by Diego Lerner RN  Flowsheets (Taken 1/23/2024 1837)  Outcome Evaluation: Alert and oriented x 4. Mod I in room with walker and managing cares independently. Continent of bowel and bladder. Vitals and BGs stable. Portacath in place and flushed. Chlorhexidine wipes done. Started MAP program today. She called for meds on time. She had 1 small error because she picked out only 1 tablet of Vit D3 25 mcg instead of 2 tablets. She was able to pick out meds corrrectly after that. Will continue MAP for another day to check for consistency then will discontinue. 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 This evening, she needed 1 unit novolog correction. This RN explained and guided her step by step in giving the novolog correction using the pen and she was able to do it correctly.    Overall Patient Progress: improving

## 2024-01-24 NOTE — PLAN OF CARE
"Discharge Planner Post-Acute Rehab PT:      Discharge Plan: Home with OP PT ([facility])      Precautions: falls     Current Status:  Bed Mobility: IND  Transfer: Mod I with 4WW  Gait: Mod I with 4WW, 500+ ft  Stairs: 12x8\" steps with bilat rails, mod I  Balance: Fair static, dynamic standing balance w/out UE support     Outcome Measures:   [place] (): 24/56  Dynamic Visual Acuity (): 4 line loss (>=3 considered abnormal)    As of :  [place]/56  FGA:   10mWT: 0.40 m/sec comfortable, 0.66 m/sec fast w/out device  TU.7 sec w/out device  5TSTS: 25.7 sec  4 Square Step Test: 39.6 sec w/ 1 LOB requiring physical assist  6MWT: 525 ft w/out device  ABC Scale: 54.3%     Assessment: IND day items and above outcomes assessment completed in anticipation of discharge Friday AM. No concerns from PT perspective; on track with plan to continue with OP PT to focus on static, dynamic standing balance and central vestibular habituation.     Other Barriers to Discharge (DME, Family Training, etc):   Pt has a 4WW.  No family training required from PT perspective.  PTRx HEP provided for standing balance, central vestibular habituation.  "

## 2024-01-24 NOTE — PLAN OF CARE
Goal Outcome Evaluation:      Plan of Care Reviewed With: patient    Overall Patient Progress: improvingOverall Patient Progress: improving    Outcome Evaluation: Alert and oriented. Denied of pain/SOB/N&V. Patient with power port on left chest CDI. Patient on MAP program. Patient missed calling on the time for bedtime medication. Patient successfully identified bedtime medications. Able to identify that no insulin needed based on sliding scale. Will continue MAP. Patient remained to be continent of bladder. No BM this shift.

## 2024-01-24 NOTE — PLAN OF CARE
Goal Outcome Evaluation:    Overall Patient Progress: no changeOverall Patient Progress: no change    Patient is alert and oriented x4. Make needs known. Is Mod I in the room with the walker. Continent of B&B to the bathroom. No complaints of pain. Appears asleep during hourly rounds tonight. Bg at 2am is 94. On track for discharge in 2 days. VSS. No issues overnight. Continue poc.

## 2024-01-24 NOTE — PLAN OF CARE
Discharge Planner Post-Acute Rehab OT:      Discharge Plan: home with assist for IADLs, HH OT     Precautions: fall     Current Status:  ADLs:  Mobility: mod-I FWW in room and hallways  Grooming: mod-I with FWW standing at sink  Dressing:   UB: mod-I seated EOB;   LB: mod-I sitting EOB, standing w/ 4ww to pull up over hips   Footwear: mod-I seated EOB  Bathing: SBA seated on shower chair- to be re-assessed  Toileting: Mod I  IADLs: Pt reports typically shares responsibilities w/ S/O. Motivated to return to light household tasks. Completed med mgmt task IND, light kitchen tasks,bed making, sweeping completed w/supervision for balance.  Vision/Cognition: Would benefit from continued assessment.     Assessment: Continued focus on light IADL tasks (bed making, sweeping, light kitchen tasks) with pt able to complete w/supervision for balance. Requires minimal rest breaks, did review general energy conservation principles. Pt on track for discharge date.     Other Barriers to Discharge (DME, Family Training, etc): shower stool   Family training, will not have assist during the day while S/O is working

## 2024-01-25 ENCOUNTER — APPOINTMENT (OUTPATIENT)
Dept: OCCUPATIONAL THERAPY | Facility: CLINIC | Age: 64
End: 2024-01-25
Attending: PHYSICAL MEDICINE & REHABILITATION
Payer: COMMERCIAL

## 2024-01-25 ENCOUNTER — APPOINTMENT (OUTPATIENT)
Dept: PHYSICAL THERAPY | Facility: CLINIC | Age: 64
End: 2024-01-25
Attending: PHYSICAL MEDICINE & REHABILITATION
Payer: COMMERCIAL

## 2024-01-25 LAB
ANION GAP SERPL CALCULATED.3IONS-SCNC: 10 MMOL/L (ref 7–15)
BUN SERPL-MCNC: 31.4 MG/DL (ref 8–23)
CALCIUM SERPL-MCNC: 9.2 MG/DL (ref 8.8–10.2)
CHLORIDE SERPL-SCNC: 105 MMOL/L (ref 98–107)
CREAT SERPL-MCNC: 1.03 MG/DL (ref 0.51–0.95)
DEPRECATED HCO3 PLAS-SCNC: 21 MMOL/L (ref 22–29)
EGFRCR SERPLBLD CKD-EPI 2021: 62 ML/MIN/1.73M2
GLUCOSE BLDC GLUCOMTR-MCNC: 112 MG/DL (ref 70–99)
GLUCOSE BLDC GLUCOMTR-MCNC: 156 MG/DL (ref 70–99)
GLUCOSE BLDC GLUCOMTR-MCNC: 185 MG/DL (ref 70–99)
GLUCOSE BLDC GLUCOMTR-MCNC: 92 MG/DL (ref 70–99)
GLUCOSE BLDC GLUCOMTR-MCNC: 92 MG/DL (ref 70–99)
GLUCOSE SERPL-MCNC: 86 MG/DL (ref 70–99)
PLATELET # BLD AUTO: 338 10E3/UL (ref 150–450)
POTASSIUM SERPL-SCNC: 4.1 MMOL/L (ref 3.4–5.3)
SODIUM SERPL-SCNC: 136 MMOL/L (ref 135–145)

## 2024-01-25 PROCEDURE — 85049 AUTOMATED PLATELET COUNT: CPT | Performed by: PHYSICAL MEDICINE & REHABILITATION

## 2024-01-25 PROCEDURE — 97530 THERAPEUTIC ACTIVITIES: CPT | Mod: GO

## 2024-01-25 PROCEDURE — 250N000013 HC RX MED GY IP 250 OP 250 PS 637

## 2024-01-25 PROCEDURE — 128N000003 HC R&B REHAB

## 2024-01-25 PROCEDURE — 80048 BASIC METABOLIC PNL TOTAL CA: CPT

## 2024-01-25 PROCEDURE — 97112 NEUROMUSCULAR REEDUCATION: CPT | Mod: GP | Performed by: PHYSICAL THERAPIST

## 2024-01-25 PROCEDURE — 999N000125 HC STATISTIC PATIENT MED CONFERENCE < 30 MIN

## 2024-01-25 PROCEDURE — 97535 SELF CARE MNGMENT TRAINING: CPT | Mod: GO

## 2024-01-25 PROCEDURE — 36415 COLL VENOUS BLD VENIPUNCTURE: CPT | Performed by: PHYSICAL MEDICINE & REHABILITATION

## 2024-01-25 PROCEDURE — 250N000011 HC RX IP 250 OP 636

## 2024-01-25 PROCEDURE — 999N000150 HC STATISTIC PT MED CONFERENCE < 30 MIN: Performed by: PHYSICAL THERAPIST

## 2024-01-25 PROCEDURE — 97530 THERAPEUTIC ACTIVITIES: CPT | Mod: GP | Performed by: REHABILITATION PRACTITIONER

## 2024-01-25 PROCEDURE — 99232 SBSQ HOSP IP/OBS MODERATE 35: CPT | Mod: GC | Performed by: PHYSICAL MEDICINE & REHABILITATION

## 2024-01-25 PROCEDURE — 250N000012 HC RX MED GY IP 250 OP 636 PS 637

## 2024-01-25 RX ORDER — CLONAZEPAM 1 MG/1
1 TABLET ORAL AT BEDTIME
Qty: 60 TABLET | Refills: 0 | Status: SHIPPED | OUTPATIENT
Start: 2024-01-25 | End: 2024-02-19

## 2024-01-25 RX ORDER — CLONAZEPAM 0.5 MG/1
0.5 TABLET ORAL DAILY
Qty: 60 TABLET | Refills: 0 | Status: SHIPPED | OUTPATIENT
Start: 2024-01-26 | End: 2024-02-19

## 2024-01-25 RX ORDER — POTASSIUM CHLORIDE 750 MG/1
10 CAPSULE, EXTENDED RELEASE ORAL 2 TIMES DAILY
Qty: 60 CAPSULE | Refills: 0 | Status: SHIPPED | OUTPATIENT
Start: 2024-01-25

## 2024-01-25 RX ORDER — CLONAZEPAM 0.5 MG/1
0.5 TABLET ORAL DAILY PRN
Qty: 30 TABLET | Refills: 0 | Status: SHIPPED | OUTPATIENT
Start: 2024-01-25 | End: 2024-02-19

## 2024-01-25 RX ADMIN — LEVOTHYROXINE SODIUM 50 MCG: 50 TABLET ORAL at 08:14

## 2024-01-25 RX ADMIN — CALCIUM 500 MG: 500 TABLET ORAL at 08:13

## 2024-01-25 RX ADMIN — HEPARIN SODIUM (PORCINE) LOCK FLUSH IV SOLN 100 UNIT/ML 5 ML: 100 SOLUTION at 16:27

## 2024-01-25 RX ADMIN — Medication 5 ML: at 13:21

## 2024-01-25 RX ADMIN — POTASSIUM CHLORIDE 10 MEQ: 750 CAPSULE, EXTENDED RELEASE ORAL at 08:13

## 2024-01-25 RX ADMIN — PANTOPRAZOLE SODIUM 40 MG: 40 TABLET, DELAYED RELEASE ORAL at 08:13

## 2024-01-25 RX ADMIN — POLYETHYLENE GLYCOL 3350 17 G: 17 POWDER, FOR SOLUTION ORAL at 20:40

## 2024-01-25 RX ADMIN — CITALOPRAM HYDROBROMIDE 20 MG: 20 TABLET ORAL at 08:13

## 2024-01-25 RX ADMIN — Medication 50 MCG: at 08:13

## 2024-01-25 RX ADMIN — ENOXAPARIN SODIUM 40 MG: 40 INJECTION SUBCUTANEOUS at 14:55

## 2024-01-25 RX ADMIN — CLONAZEPAM 1 MG: 0.5 TABLET ORAL at 20:40

## 2024-01-25 RX ADMIN — INSULIN ASPART 1 UNITS: 100 INJECTION, SOLUTION INTRAVENOUS; SUBCUTANEOUS at 13:22

## 2024-01-25 RX ADMIN — INSULIN ASPART 1 UNITS: 100 INJECTION, SOLUTION INTRAVENOUS; SUBCUTANEOUS at 17:36

## 2024-01-25 RX ADMIN — POTASSIUM CHLORIDE 10 MEQ: 750 CAPSULE, EXTENDED RELEASE ORAL at 20:40

## 2024-01-25 RX ADMIN — SENNOSIDES 1 TABLET: 8.6 TABLET, FILM COATED ORAL at 20:41

## 2024-01-25 RX ADMIN — PREDNISONE 50 MG: 10 TABLET ORAL at 08:13

## 2024-01-25 RX ADMIN — FOLIC ACID 1000 MCG: 1 TABLET ORAL at 08:13

## 2024-01-25 RX ADMIN — SENNOSIDES 1 TABLET: 8.6 TABLET, FILM COATED ORAL at 08:14

## 2024-01-25 RX ADMIN — CALCIUM 500 MG: 500 TABLET ORAL at 17:39

## 2024-01-25 RX ADMIN — CLONAZEPAM 0.5 MG: 0.5 TABLET ORAL at 08:13

## 2024-01-25 ASSESSMENT — ACTIVITIES OF DAILY LIVING (ADL)
ADLS_ACUITY_SCORE: 31

## 2024-01-25 NOTE — DISCHARGE SUMMARY
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 Pt completed 5 day course of solumedrol and IVIG w/ improvement in tone, hyperreflexia, and tremor noted. In addition to managing her neurologic status, she also has required medical mgmt of her urinary retention which is improving, constipation improving, blood sugars management due to high dose steroids and long term taper, pain, and mental health.    Throughout their hospital stay, they worked daily with PT, OT, and SLP and were seen daily by PMR physician. Throughout her stay she progressed well with therapies. There were no major complications throughout her hospitalization. Deemed stable for discharge on 1/26/24.     Rehabilitation Course    PT: At admission, min assist for transfers and mobility  On discharge, modified independent with four wheel walker for all transfers and gait    OT: At admission, min assist with grooming, moderate assist with UBD, LBD, and toilet transfers, and max assist with toileting.  On discharge, modified  independent with grooming, UBD, LBD, and toileting. Standby assist seated with shower chair for bathing.      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 To accompany glucose monitor brands per insurance coverage.  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      polyethylene glycol (MIRALAX) 17 GM/Dose powder Take 17 g by mouth daily  Qty: 510 g, Refills: 0    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 - Potential duplicate medications found. Please discuss with provider.        CONTINUE these medications which have CHANGED    Details   calcium carbonate 500 mg, elemental, (OSCAL) 500 MG tablet Take 1 tablet (500 mg) by mouth 2 times daily (with meals)  Qty: 120 tablet, Refills: 0    Associated Diagnoses: Osteoporosis prophylaxis      citalopram (CELEXA) 20 MG tablet Take 1 tablet (20 mg) by mouth daily  Qty: 30 tablet, Refills: 0    Associated Diagnoses: Anxiety      !! 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 - Potential duplicate medications found. Please discuss with provider.        CONTINUE these medications which have NOT CHANGED    Details   acetaminophen (TYLENOL) 500 MG tablet Take 500-1,000 mg by mouth every 6 hours as needed for mild pain           STOP taking these medications       cholecalciferol 50 MCG (2000 UT) CAPS Comments:   Reason for Stopping:         meclizine (ANTIVERT) 25 MG tablet Comments:   Reason for Stopping:         metoclopramide (REGLAN) 10 MG tablet Comments:   Reason for Stopping:         omeprazole (PRILOSEC) 20 MG DR capsule Comments:   Reason for Stopping:         ondansetron (ZOFRAN ODT) 4 MG ODT tab Comments:   Reason for  Stopping:         potassium chloride ER (K-TAB/KLOR-CON) 10 MEQ CR tablet Comments:   Reason for Stopping:                 DISCHARGE INSTRUCTIONS AND FOLLOW UP  Discharge Procedure Orders   Physical Therapy Referral   Standing Status: Future   Referral Priority: Routine: Next available opening Referral Type: Rehab Therapy Physical Therapy   Number of Visits Requested: 1     Occupational Therapy Referral   Standing Status: Future   Referral Priority: Routine: Next available opening Referral Type: Occupational Therapy   Number of Visits Requested: 1     Adult Neurology  Referral   Standing Status: Future   Referral Priority: Routine: Next available opening Referral Type: Consultation   Number of Visits Requested: 1          Physical Exam    Most recent Vital Signs:   Vitals:    01/24/24 0630 01/24/24 0817 01/24/24 1633 01/25/24 0811   BP: 113/76 121/74 118/61 121/60   BP Location: Right arm Left arm Left arm Left arm   Patient Position: Sitting Semi-Rodrigues's  Semi-Rodrigues's   Cuff Size:  Adult Regular  Adult Regular   Pulse: 95 98 95    Resp: 17  16 20   Temp: 97.8  F (36.6  C)  98.3  F (36.8  C) 98.3  F (36.8  C)   TempSrc: Oral  Oral Oral   SpO2: 99% 99% 98% 98%   Weight:           General: in no acute distress, conversational and alert, resting comfortably in bed  HEENT: atraumatic, nares clear, conjunctiva white  Pulmonary: on room air, lungs clear to auscultation bilaterally  Cardiovascular: RRR, no murmur auscultated, well-perfused peripherally. Port (De-accessed) to Right upper chest c/d/I.   Abdominal: soft, non-tender to palpation, non-distended  Extremities: warm peripherally, no edema in BLEs  Skin: warm, dry, without erythema, ecchymosis, or rash noted  MSKNeuro: Moves all 4 extremities volitionally against gravity. No tone appreciated, No tremors appreciates (resting or intentional)      Assessment &Plan  Shayy Cortez is a 63 year old female with PMH of cutaneous melanoma of the abdomen s/p one  year treatment of nivolumab with development of progressive tremor, transient vertigo, abnormal eye movements and gait instability despite cessation of Nivolumab that started in August 2023. 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 Pt completed 5 day course of solumedrol and IVIG w/ improvement in tone, hyperreflexia, and tremor noted.  In addition to managing her neurologic status, she also has required medical mgmt of her urinary retention which is improving, constipation improving, blood sugars management due to high dose steroids and long term taper, pain, and mental health     Admission to acute inpatient rehab 01/18.    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   Impairment of mobility:  Continue with outpatient PT      Medical Conditions  #Concern for stiff person syndrome vs PERM vs antibody negative autoimmune process.   #Spastic gait   #Full body postural tremors   #Square wave jerks  She has a primary neurologist, Dr. Delcid in [facility]. She developed symptoms in August of headaches, dizziness, uncontrolled tremors and mild confusion. MRI brain and CTA head and neck unrevealing.During this hospitalization, patient completed IVIG 20g 5/5 dose 1/10/23-1/14/23 and Solumedrol 1000mg 5/5 dose (last on 1/15/23) alongside clonazepam   -Continue Clonazepam 0.5mg in AM, 1mg at bedtime and 0.5mg daily PRN.  -Continue with prednisone taper               --60mg for seven total doses (ending 1/24). Then decrease by 10mg every 7 days (ending 2/28). Will then continue PTA chronic 5mg prednisone daily.   -Continue prophylactic bactrim for 35 total days (Final dose 2/16) while on high dose steroids  -Vit D, calcium supplementation and protonix for 35 days during steroid taper.   -Referral placed for outpatient neuroimmunology for potential initiation of pulse IVIG.   - ESTEPHANIA 65, DPPX  antibodies. Appear negative as of 1/22. Follow up with neurology outpatient.      #Rheumatoid Arthritis  -Steroid taper and treatment as directed above.   -Follow up with rheumatology     #Pain  -Pain remained well controlled throughout stay     #Steroid induced hyperglycemia   Have been requiring 2-3 units per day inconsistently.   -Will continue with sliding scale insulin while on high dose prednisone tape. Will discharge on sliding scale. Diabetic education provided prior to discharge.      #Leukocytosis   #Anemia  Leukocytosis likely due to steroid. Patient remains without signs/symptoms of infection.   Anemia seems chronic but stable.   - Follow up with PCP      # Moderate Cervical Spinal Canal Stenosis of C3 - C7  Incidental finding on C-Spine MRI. MRI without cord signal change so less likely myelopathy from cervical stenosis. May consider repeat MRI and neurosurgery referral in future if symptoms don't improve with steroid treatment.      # Anxiety  # Depression  - Continue with 20mg Citalopram. Previously on 40mg PTA. Consider increasing to PTA dosing during ARU stay.  -Follow up with PCP     #Hypothyroidism  -Continue PTA levothyroxine      #Bladder  #Urinary Retention, resolved  Patient had urinary retention requiring keita catheter in acute setting.   -Patent voiding on her own volition without any complications.   -PVR X2 negative upon admission. Remained continent with no issues.      #Bowel  Patient had issues of constipation in acute setting.   -Bowel movements becoming more regular, but still small amount per patient.  -Patient will continue with scheduled miralax upon discharge.          Follow Up Appointments  Neuroimmunology  Neurology follow up with [facility]  PCP  PM&R      Patient was evaluated on day of discharge by attending physician, Emeterio Rivas DO, who agrees with plan of care.    Discharge summary was forwarded to Dr. David Valente at the time of discharge, so as to  bridge from hospital to outpatient care.     It was our pleasure to care for Shayy Cortez during this hospitalization. Please do not hesitate to contact me should there be questions regarding the hospital course or discharge plan.      Trevon Marr,   PGY2  Physical Medicine and Rehabilitation-[facility]  Pager: [phone]

## 2024-01-25 NOTE — PLAN OF CARE
Goal Outcome Evaluation:      Plan of Care Reviewed With: patient    Overall Patient Progress: improvingOverall Patient Progress: improving    Alert and oriented x 4, denies headache or dizziness, destinee crani incisions, cdi and open to air, BG's today 92 and 156, appetite is good. Fadi in her room, will de access port O cath before dis change per provider, will continue Poc

## 2024-01-25 NOTE — PLAN OF CARE
Occupational Therapy Discharge Summary    Reason for therapy discharge:    Discharged to home with outpatient therapy.    Progress towards therapy goal(s). See goals on Care Plan in [facility] electronic health record for goal details.  Goals met    Therapy recommendation(s):    Continued therapy is recommended.  Rationale/Recommendations:  recommend continued OP therapy to progress functional endurance and dynamic balance needed for increased engagement in ADLs/IADLs.

## 2024-01-25 NOTE — PROGRESS NOTES
"  [facility]   Acute Rehabilitation Unit    INTERVAL HISTORY  Notes and labs reviewed over past 24 hours. No acute overnight events reported    Patient was seen and examined. She has no new acute concerns or complaints. She is excited to be going home tomorrow. She reports the increased tremors she was having two days ago have resolved. She remains without pain or stiffness.     ROS: 10 point ROS was assessed and was negative unless otherwise stated in HPI      Functionally,    With PT: 1/24    Current Status:  Bed Mobility: IND  Transfer: Mod I with 4WW  Gait: Mod I with 4WW, 500+ ft  Stairs: 12x8\" steps with bilat rails, mod I  Balance: Fair static, dynamic standing balance w/out UE support     Outcome Measures:   Delgadillo (1/19): 24/56  Dynamic Visual Acuity (1/23): 4 line loss (>=3 considered abnormal)    With OT: 1/24    Current Status:  ADLs:  Mobility: mod-I FWW in room and hallways  Grooming: mod-I with FWW standing at sink  Dressing:   UB: mod-I seated EOB;   LB: mod-I sitting EOB, standing w/ 4ww to pull up over hips   Footwear: mod-I seated EOB  Bathing: SBA seated on shower chair- to be re-assessed  Toileting: Mod I  IADLs: Pt reports typically shares responsibilities w/ S/O. Motivated to return to light household tasks. Completed med mgmt task IND, light kitchen tasks,bed making, sweeping completed w/supervision for balance.  Vision/Cognition: Would benefit from continued assessment.              MEDICATIONS  Scheduled meds   calcium carbonate 500 mg (elemental)  500 mg Oral BID w/meals    citalopram  20 mg Oral Daily    clonazePAM  0.5 mg Oral Daily    clonazePAM  1 mg Oral At Bedtime    enoxaparin ANTICOAGULANT  40 mg Subcutaneous Q24H    folic acid  1,000 mcg Oral Daily    heparin  5-10 mL Intracatheter Q28 Days    heparin lock flush  5-10 mL Intracatheter Q24H    insulin aspart  1-3 Units Subcutaneous TID AC    insulin aspart  1-3 Units Subcutaneous At Bedtime    " levothyroxine  50 mcg Oral Daily    pantoprazole  40 mg Oral QAM AC    polyethylene glycol  17 g Oral BID    potassium chloride ER  10 mEq Oral BID    [START ON 2/22/2024] predniSONE  10 mg Oral Daily    [START ON 2/15/2024] predniSONE  20 mg Oral Daily    [START ON 2/8/2024] predniSONE  30 mg Oral Daily    [START ON 2/1/2024] predniSONE  40 mg Oral Daily    [START ON 2/29/2024] predniSONE  5 mg Oral QAM    predniSONE  50 mg Oral Daily    sennosides  1 tablet Oral BID    sodium chloride (PF)  10-20 mL Intracatheter Q28 Days    sulfamethoxazole-trimethoprim  1 tablet Oral Q Mon Wed Fri AM    cholecalciferol  50 mcg Oral Daily       PRN meds:  acetaminophen, clonazePAM, glucose **OR** dextrose **OR** glucagon, heparin lock flush, lidocaine, meclizine, metoclopramide, sodium chloride (PF), sodium chloride (PF)      PHYSICAL EXAM  /61 (BP Location: Left arm)   Pulse 95   Temp 98.3  F (36.8  C) (Oral)   Resp 16   Wt 48.9 kg (107 lb 11.2 oz)   SpO2 98%   BMI 19.69 kg/m    General: in no acute distress, conversational and alert, resting comfortably in bed  HEENT: atraumatic, nares clear, conjunctiva white  Pulmonary: on room air, lungs clear to auscultation bilaterally  Cardiovascular: RRR, no murmur auscultated, well-perfused peripherally. Port access to Right upper chest c/d/i  Abdominal: soft, non-tender to palpation, non-distended  Extremities: warm peripherally, no edema in BLEs  Skin: warm, dry, without erythema, ecchymosis, or rash noted  MSKNeuro: Moves all 4 extremities volitionally against gravity. No tone appreciated, No tremors appreciates (resting or intentional)      LABS  Results for orders placed or performed during the hospital encounter of 01/18/24 (from the past 24 hour(s))   Glucose by meter   Result Value Ref Range    GLUCOSE BY METER POCT 86 70 - 99 mg/dL   Glucose by meter   Result Value Ref Range    GLUCOSE BY METER POCT 170 (H) 70 - 99 mg/dL   Glucose by meter   Result Value Ref Range     GLUCOSE BY METER POCT 117 (H) 70 - 99 mg/dL   Glucose by meter   Result Value Ref Range    GLUCOSE BY METER POCT 166 (H) 70 - 99 mg/dL   Glucose by meter   Result Value Ref Range    GLUCOSE BY METER POCT 92 70 - 99 mg/dL   Platelet count   Result Value Ref Range    Platelet Count 338 150 - 450 10e3/uL   Basic metabolic panel   Result Value Ref Range    Sodium 136 135 - 145 mmol/L    Potassium 4.1 3.4 - 5.3 mmol/L    Chloride 105 98 - 107 mmol/L    Carbon Dioxide (CO2) 21 (L) 22 - 29 mmol/L    Anion Gap 10 7 - 15 mmol/L    Urea Nitrogen 31.4 (H) 8.0 - 23.0 mg/dL    Creatinine 1.03 (H) 0.51 - 0.95 mg/dL    GFR Estimate 62 >60 mL/min/1.73m2    Calcium 9.2 8.8 - 10.2 mg/dL    Glucose 86 70 - 99 mg/dL   Glucose by meter   Result Value Ref Range    GLUCOSE BY METER POCT 92 70 - 99 mg/dL       ASSESSMENT AND PLAN    Shayy Cortez is a 63 year old female with PMH of cutaneous melanoma of the abdomen s/p one year treatment of nivolumab with development of progressive tremor, transient vertigo, abnormal eye movements and gait instability despite cessation of Nivolumab that started in August 2023. 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 Pt completed 5 day course of solumedrol and IVIG w/ improvement in tone, hyperreflexia, and tremor noted.  In addition to managing her neurologic status, she also has required medical mgmt of her urinary retention which is improving, constipation improving, blood sugars management due to high dose steroids and long term taper, pain, and mental health        Admission to acute inpatient rehab 01/18.    Impairment group code: Neurologic Conditions 03.9 Other Neurologic: Stiff-Person Syndrome         PT, OT and SLP 90 minutes of each 6 days per week, in addition to rehab nursing and close management of physiatrist.       Impairment of ADL's:  OT for 90 minutes 6 days per week to work on ADL re-training such as grooming, self cares and bathing.       Impairment of  mobility:  PT for 90 minutes 6 days per week to work on neuromuscular re-education focusing on strength, balance, coordination, and endurance.          Rehab RN for med administration, bowel regimen, glucose monitoring and wound care.         Medical Conditions  #Concern for stiff person syndrome vs PERM vs antibody negative autoimmune process.   #Spastic gait   #Full body postural tremors   #Square wave jerks  She has a primary neurologist, Dr. Delcid in [facility]. She developed symptoms in August of headaches, dizziness, uncontrolled tremors and mild confusion. MRI brain and CTA head and neck unrevealing.During this hospitalization, patient completed IVIG 20g 5/5 dose 1/10/23-1/14/23 and Solumedrol 1000mg 5/5 dose (last on 1/15/23) alongside clonazepam   -PT and OT  -Continue Clonazepam 0.5mg in AM, 1mg at bedtime and 0.5mg daily PRN. 1/23 Encouraged PRN dose if tremors interfering with therapies or ADLs  -Continue with prednisone taper               --60mg for seven total doses (ending 1/24). Then decrease by 10mg every 7 days (ending 2/28). Will then continue PTA chronic 5mg prednisone daily.   -Continue prophylactic bactrim for 35 total days (Final dose 2/16) while on high dose steroids  -Vit D, calcium supplementation and protonix for 35 days during steroid taper.   -Referral placed for outpatient neuroimmunology for potential initiation of pulse IVIG.   - ESTEPHANIA 65, DPPX antibodies. Appear negative as of 1/22     #Rheumatoid Arthritis  -Steroid taper and treatment as directed above.      #Pain  -Tyelonol and lidocaine patches as needed.  -Patient without increased tone at this time. Can consider baclofen if muscle spasticity or rigidity develop and pain increases.      #Steroid induced hyperglycemia   Have been requiring 2-3 units per day inconsistently.   -Will continue with sliding scale insulin while on high dose prednisone tape. Anticipate discharging with this  -Diabetes education consult ordered.       #Leukocytosis   #Anemia  Leukocytosis likely due to steroid. Patient remains without signs/symptoms of infection.   Anemia seems chronic but stable.   -Will monitor clinically and check weekly      # Moderate Cervical Spinal Canal Stenosis of C3 - C7  Incidental finding on C-Spine MRI. MRI without cord signal change so less likely myelopathy from cervical stenosis. May consider repeat MRI and neurosurgery referral in future if symptoms don't improve with steroid treatment.      # Anxiety  # Depression  - Continue with 20mg Citalopram. Previously on 40mg PTA. Consider increasing to PTA dosing during ARU stay.  -Patient continues to deny desire at this time for clinical psychology at this time while at the ARU.      #Hypothyroidism  -Continue PTA levothyroxine      #Bladder  #Urinary Retention, resolved  Patient had urinary retention requiring keita catheter in acute setting.   -Patent voiding on her own volition without any complications.   -PVR X2 negative upon admission. Bladder protocol in place.      #Bowel  Patient had issues of constipation in acute setting.   -Bowel movements becoming more regular, but still small amount per patient.  -Continue schedule miralax and Senokot BID.            Adjustment to disability:  Patient denied desire at this time for clinical psychology at this time while at the ARU.   FEN: Regular diet with thin liquids  DVT Prophylaxis: Lovenox until ambulating community distance. Discontinue upon discharge.   GI Prophylaxis: Protonix while on steroid taper  Code: Full, confirmed on admission  Disposition: Home  ELOS:  1/26  Rehab prognosis:  Good  Follow up Appointments on Discharge:   Neuroimmunology  Neurology follow up with [facility]  PCP  PM&R        Seen and discussed with Dr. Rivas, PM&R staff physician     Trevon Marr DO  PGY2  Physical Medicine and Rehabilitation-[facility]  Pager: [phone]

## 2024-01-25 NOTE — PLAN OF CARE
Goal Outcome Evaluation:      Plan of Care Reviewed With: patient    Overall Patient Progress: improvingOverall Patient Progress: improving    Outcome Evaluation: Alert and oriented. Denied of pain/sob/N&V. Patient passed MAP. PAtient with power port on left chest. Dressing CDI. Declined CHG wipes when offered. Patient said she will do it in the morning when she has a shower. She want shower on the morning. BG checked. No SSI given. Mod I in room

## 2024-01-25 NOTE — PLAN OF CARE
"Physical Therapy Discharge Summary    Reason for therapy discharge:    Discharged to home with outpatient therapy.    Progress towards therapy goal(s). See goals on Care Plan in [facility] electronic health record for goal details.  Goals met    Current Status:  Bed Mobility: IND  Transfer: Mod I with 4WW  Gait: Mod I with 4WW, 500+ ft  Stairs: 12x8\" steps with bilat rails, mod I  Balance: Fair static, dynamic standing balance w/out UE support     Outcome Measures:   Dynamic Visual Acuity (): 4 line loss (>=3 considered abnormal)     As of :  Delgadillo/56  FGA:   10mWT: 0.40 m/sec comfortable, 0.66 m/sec fast w/out device  TU.7 sec w/out device  5TSTS: 25.7 sec  4 Square Step Test: 39.6 sec w/ 1 LOB requiring physical assist  6MWT: 525 ft w/out device  ABC Scale: 54.3%    Therapy recommendation(s):    Continued therapy is recommended.  Rationale/Recommendations:  To address ongoing impaired static, dynamic standing balance and central vestibular dysfunction (gaze stabilization compensation), for improved safety and IND with functional mobility, ADLs, and IADLs and to reduce risk of future falls. Pt has a 4WW available at home. Pt has been issued a HEP to address above impairments/functional limitations.    "

## 2024-01-25 NOTE — PLAN OF CARE
Discharge Planner Post-Acute Rehab OT:      Discharge Plan: home with assist for IADLs     Precautions: fall     Current Status:  ADLs:  Mobility: mod-I FWW in room and hallways  Grooming: mod-I with FWW standing at sink  Dressing:   UB: mod-I seated EOB;   LB: mod-I sitting EOB, standing w/ 4ww to pull up over hips   Footwear: mod-I seated EOB  Bathing: Mod I seated on shower chair- to be re-assessed  Toileting: Mod I  IADLs: Pt reports typically shares responsibilities w/ S/O. Motivated to return to light household tasks. Completed med mgmt task IND, light kitchen tasks,bed making, sweeping completed w/supervision for balance.  Vision/Cognition: Would benefit from continued assessment.     Assessment: Pt completed IND day morning routine, has progressed to IND/Mod I with all ADLs with use of 4WW. .      Other Barriers to Discharge (DME, Family Training, etc): shower stool   Family training, will not have assist during the day while S/O is working

## 2024-01-25 NOTE — PLAN OF CARE
"Goal Outcome Evaluation:     Acute Rehab Care Conference/Team Rounds    Type: Team Rounds    Present: Dr. Emeterio Rivas, Resident MD Trevon Marr, Dr. Lyubov Shoemaker Neuropsychologist, Javon Shah PT, Eduin Wray OT, Beronica Layton LICSW, Monserrat Soni PCS, Carlotta Patton RD, Tomas Pérez RN, and Shayy Cortez Patient.      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 S/o working outside the home and is supportive. Indep at baseline. Decline in function in the last year. Depression and anxiety reported by pt. Medication-management. 4 adult children. Grandchildren bring pt bhumi. No financial concerns. Applied for SSDI recently. Worked as a RN in the past.     ADLs/IADLs: Pt Mod I ADLs and supervision light IADL and cognitive tasks. Plan for discharge home, alone during daytime with A from spouse for heavier physical IADLs with HCOT. DME: Recommend shower stool. Pt on track for discharge tomorrow.    Mobility:   Bed Mobility: IND  Transfer: Mod I with 4WW  Gait: Mod I with 4WW, 500+ ft  Stairs: 12x8\" steps with bilat rails, mod I  Balance: Fair static, dynamic standing balance w/out UE support  Delgadillo (1/19): 24/56  Dynamic Visual Acuity (1/23): 4 line loss (>=3 considered abnormal)  As of 1/24:  Delgadillo: "   FGA:   10mWT: 0.40 m/sec comfortable, 0.66 m/sec fast w/out device  TU.7 sec w/out device  5TSTS: 25.7 sec  4 Square Step Test: 39.6 sec w/ 1 LOB requiring physical assist  6MWT: 525 ft w/out device  ABC Scale: 54.3%  Pt has met all goals for safe discharge. She has a 4WW and no family training required from PT perspective. Plan for ongoing OP PT/OT at location close to home (orders faxed).    Cognition/Language: intact     Community Re-Entry: Plan for ongoing OP PT to progress community mobility tolerance.    Transportation:  to provide. Car transfer not a barrier.    Decision maker: self    Plan of Care and goals reviewed and updated.    Discharge Plan/Recommendations    Fall Precautions: continue    Patient/Family input to goals: yes     Estimated length of stay: 14 days     Overall plan for the patient: reach a level of mod  i      Utilization Review and Continued Stay Justification    Medical Necessity Criteria:    For any criteria that is not met, please document reason and plan for discharge, transfer, or modification of plan of care to address.    Requires intensive rehabilitation program to treat functional deficits?: Yes    Requires 3x per week or greater involvement of rehabilitation physician to oversee rehabilitation program?: Yes    Requires rehabilitation nursing interventions?: Yes    Patient is making functional progress?: Yes    There is a potential for additional functional progress? Yes    Patient is participating in therapy 3 hours per day a minimum of 5 days per week or 15 hours per week in 7 day period?:Yes    Has discharge needs that require coordinated discharge planning approach?:Yes      Barriers/Concerns related to meeting medical necessity criteria:  none    Team Plan to Address Concern:  As needed      Final Physician Sign off    Statement of Approval:  Emeterio Rivas, DO      Patient Goals    Social Work Goals: Confirm discharge recommendations with therapy,  coordinate safe discharge plan and remain available to support and assist as needed.    OT Predicted Duration/Target Date for Goal Attainment: 01/26/24  Therapy Frequency (OT): 6 times/week  OT: Hygiene/Grooming: modified independent  OT: Upper Body Dressing: Modified independent  OT: Lower Body Dressing: Modified independent  OT: Upper Body Bathing: Modified independent  OT: Lower Body Bathing: Modified independent        OT: Toilet Transfer/Toileting: Modified independent  OT: Meal Preparation: Modified independent, with simple meal preparation, ambulatory level  OT: Home Management: Modified independent, with light demand household tasks, ambulatory level  OT: Cognitive: Patient/caregiver will verbalize understanding of cognitive assessment results/recommendations as needed for safe discharge planning                               PT Predicted Duration/Target Date for Goal Attainment: 01/26/24  PT Frequency: 6x/week  PT: Bed Mobility: Goal Met  PT: Transfers: Goal Met  PT: Gait: Goal Met  PT: Stairs: Goal Met  PT: Perform aerobic activity with stable cardiovascular response:  (Adequate for discharge)  PT: Goal 1:  (Goal Met)  PT: Goal 2:  (Adequate for discharge)  PT: Goal 3:  (Adequate for discharge)    Nursing Goals  Bowel and Bladder care  Fall prevention   Medication Education  Skin Care protection

## 2024-01-25 NOTE — PLAN OF CARE
Goal Outcome Evaluation:      Plan of Care Reviewed With: patient    Overall Patient Progress: improvingOverall Patient Progress: improving    Outcome Evaluation: Pt is alert and oriented x 4. Mod I with walker in room. Managed toileting independently. Had a bm today per report. Vitals and BGs stable. Did get 1 unit correction novolog at noon. on MAP. She was able to call for meds on time and pick out meds correctly. Excited about discharge this Friday.

## 2024-01-25 NOTE — PROGRESS NOTES
"SW met with pt to complete discharge IRF questions and follow up on resources previously discussed with pt. Pt plans to discharge home tomorrow. Please see BIMS flowsheet and below for pt answers to IRF questions. Pt reported that she has not completed the HCD provided to her. Pt would like to continue to look over HCD with her significant other and plans to continue to consider completing the document. CODIE also provided pt with paperwork/education for Power of  documentation, [employer] medical rides through pt's insurance and life alert information. Pt had no other questions for SW at this time and is looking forward to going home tomorrow.       NICKY Pain Assessment    Pain Effect on Sleep  Over the past 5 days, how much of the time has pain made it hard for you to sleep at night?\"    0. Does not apply - I have not had any pain or hurting in the past 5 days      DANIELA Ramirez   [facility]   Assisting ARU SW Ph: [phone]   "

## 2024-01-26 VITALS
OXYGEN SATURATION: 98 % | DIASTOLIC BLOOD PRESSURE: 74 MMHG | HEART RATE: 92 BPM | BODY MASS INDEX: 19.69 KG/M2 | WEIGHT: 107.7 LBS | RESPIRATION RATE: 16 BRPM | TEMPERATURE: 97.8 F | SYSTOLIC BLOOD PRESSURE: 135 MMHG

## 2024-01-26 LAB
GLUCOSE BLDC GLUCOMTR-MCNC: 111 MG/DL (ref 70–99)
GLUCOSE BLDC GLUCOMTR-MCNC: 114 MG/DL (ref 70–99)
GLUCOSE BLDC GLUCOMTR-MCNC: 71 MG/DL (ref 70–99)

## 2024-01-26 PROCEDURE — 250N000012 HC RX MED GY IP 250 OP 636 PS 637

## 2024-01-26 PROCEDURE — 99239 HOSP IP/OBS DSCHRG MGMT >30: CPT | Mod: GC | Performed by: PHYSICAL MEDICINE & REHABILITATION

## 2024-01-26 PROCEDURE — 250N000013 HC RX MED GY IP 250 OP 250 PS 637

## 2024-01-26 RX ADMIN — POTASSIUM CHLORIDE 10 MEQ: 750 CAPSULE, EXTENDED RELEASE ORAL at 08:51

## 2024-01-26 RX ADMIN — CITALOPRAM HYDROBROMIDE 20 MG: 20 TABLET ORAL at 08:52

## 2024-01-26 RX ADMIN — SULFAMETHOXAZOLE AND TRIMETHOPRIM 1 TABLET: 800; 160 TABLET ORAL at 08:53

## 2024-01-26 RX ADMIN — CALCIUM 500 MG: 500 TABLET ORAL at 08:52

## 2024-01-26 RX ADMIN — LEVOTHYROXINE SODIUM 50 MCG: 50 TABLET ORAL at 08:53

## 2024-01-26 RX ADMIN — CLONAZEPAM 0.5 MG: 0.5 TABLET ORAL at 08:53

## 2024-01-26 RX ADMIN — Medication 50 MCG: at 08:52

## 2024-01-26 RX ADMIN — PREDNISONE 50 MG: 10 TABLET ORAL at 08:52

## 2024-01-26 RX ADMIN — FOLIC ACID 1000 MCG: 1 TABLET ORAL at 08:53

## 2024-01-26 RX ADMIN — PANTOPRAZOLE SODIUM 40 MG: 40 TABLET, DELAYED RELEASE ORAL at 08:54

## 2024-01-26 RX ADMIN — SENNOSIDES 1 TABLET: 8.6 TABLET, FILM COATED ORAL at 08:54

## 2024-01-26 ASSESSMENT — ACTIVITIES OF DAILY LIVING (ADL)
ADLS_ACUITY_SCORE: 31

## 2024-01-26 NOTE — PLAN OF CARE
Goal Outcome Evaluation:      Plan of Care Reviewed With: patient    Overall Patient Progress: improvingOverall Patient Progress: improving    BG today before break fast 71, patient remains alert and oriented, no symptoms of hypoglycemia noted, 4 FL oz of OJ given, will continue to monitor

## 2024-01-26 NOTE — CONSULTS
Diabetes Educator consult received for Shayy Cortez, age 63,   with PMH of cutaneous melanoma of the abdomen s/p one year treatment of nivolumab with development of progressive tremor, transient vertigo, abnormal eye movements and gait instability despite cessation of Nivolumab that started in August 2023. 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 Pt completed 5 day course of solumedrol and IVIG w/ improvement in tone, hyperreflexia, and tremor noted. In addition to managing her neurologic status, she also has required medical mgmt of her urinary retention which is improving, constipation improving, blood sugars management due to high dose steroids and long term taper, pain, and mental health.  Discharging from ARU today to home with significant other.     Shayy developed relatively mild steroid induced hyperglycemia with her high dose steroid treatment, Prednison currently at 50 mg daily in am by mouth.  Her taper will take her into early March.  For this, she has received glucose monitoring and a low Novolog correction.  Dr. Marr is discharging her with the plan below.    1/26/2024-Discharge instructions for Shayy Cortez    Glucose Monitoring  Three times a day before each meal and again at bedtime.    Insulin Aspart (Novolog) Correction Scale    Three times a day before each meal based on blood glucose value.  For Pre-Meal  - 239 give 1 unit.      For Pre-Meal  - 339 give 2 units.   For Pre-Meal BG greater than or equal to 340 give 3 units.     Bedtime based on blood glucose value.  For  - 299 give 1 unit.   For  - 399 give 2 units   For BG greater than or equal 400 give 3 units.     Met with Shayy and her significant other, Jorge, at bedside.  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  She also states that she worked with the RN here in ARU and has administered a couple of  her own injections.  For years, she took Embrel subcutaneously in her abdomen and is not hesitant.  Jorge states he has done subcutaneous injections as well, though Shayy is clear she will be doing her own.    Education provided included:   What steroid induced hyperglycemia is, insulin resistance, and treatment.  Target blood glucoses.  Glucose monitoring with Henok Contour Next glucose meter, Microlet lancets/device.  Obtained meter from discharge meds bag and instructed both on loading and unloading lancing device, set-up of meter, testing and fingerstick technique, use of memory, safe sharps disposal.  Discussed sharps containers such as detergent bottle, milk jug, placing 1 tbsp bleach in container and disposing sharps in it.  Keep sealed.  Follow [facility] guidelines for sharps disposal.  Shayy was able to perform self-test without difficulty and glucose was 124 mg/dL on her new home meter.  She does have some tremulousness in both hands, but she is able to safely perform tasks.  Action, peak, dosage, duration of Novolog insulin.  Concept of correction scale, different threshold for pre-meal and bedtime.  Both, Shayy and Jorge, able to verbalize clear understanding of the dosing when needed.  Insulin administration with training pen, B-D 4 mm antonette pen needles and injection pad.  Discussed storage of insulin, site selection and rotation.  Demonstrated placement of pen needle, priming, dialing dose, injection technique, safe pen needle removal and disposal.  Patient was able to return demonstration without difficulty.  Signs/symptoms/causes/treatment of hyper and hypoglycemia.  Discussed low glucose < 70 mg/dL, Rule of 15.  If experiencing low glucose from correction, notify MD.  Resources provided included treatment plan, Embecta Insulin Injections with Pens and Pen Needles, Signs and Symptoms of Hyper/Hypoglycemia.    No further questions.    Supplies needed at discharge:  Henok Contour Next glucose meter   Nykaa  Contour Next test strips   Henok Contour Microlet lancets   Sharps Container   Alcohol Wipes   B-D 4 mm antonette pen needles   Novolog Arelis aMrin RN, and Casandra RN Charge, aware of completion of education.    DALILA Randolph  Diabetes Clinical Nurse Specialist/[facility]  [phone]

## 2024-01-26 NOTE — PLAN OF CARE
FOCUS/GOAL  Medical management    ASSESSMENT, INTERVENTIONS AND CONTINUING PLAN FOR GOAL:  Pt is alert and oriented. Mod I in room. Denies pain. Seen sleeping mostly during safety checks. Discharging today.  Goal Outcome Evaluation:

## 2024-01-26 NOTE — PLAN OF CARE
Goal Outcome Evaluation:      Plan of Care Reviewed With: patient    Overall Patient Progress: improvingOverall Patient Progress: improving    Patient discharged home, discharge meds and AVS reviewed, insulin form that was in the fridge given to patient, stable at the time of discharge

## 2024-01-26 NOTE — PLAN OF CARE
Goal Outcome Evaluation:         Overall Patient Progress: improvingOverall Patient Progress: improving    Outcome Evaluation: Denies pain the whole shift. Ate 100% for dinner, blood sugar checked and insulin given per orders. MOD I with FWW, doing well. Looking forward to discharge tomorrow.

## 2024-01-27 ENCOUNTER — PATIENT OUTREACH (OUTPATIENT)
Dept: CARE COORDINATION | Facility: CLINIC | Age: 64
End: 2024-01-27
Payer: COMMERCIAL

## 2024-01-27 NOTE — PROGRESS NOTES
"Clinic Care Coordination Contact  [facility]: Post-Discharge Note  SITUATION                                                      Admission:    Admission Date: 01/18/24   Reason for Admission: Progressive tremor, transient vertigo, abnormal eye movements and gait instability  Discharge:   Discharge Date: 01/26/24  Discharge Diagnosis: Neurologic Conditions - Other Neurologic: Stiff-Person Syndrome, Pain, Rheumatoid arthritis, Steroid induced hyperglycemia, Leukocytosis, Anemia, Moderate Cervical Spinal Canal Stenosis C3-C7, Anxiety, Depression, Hypothyroidism, Urinary retention, Constipation    BACKGROUND                                                      Per hospital discharge summary and inpatient provider notes:    Shayy Cortez is a 63 year old female with PMH of cutaneous melanoma of the abdomen s/p one year treatment of nivolumab with development of progressive tremor, transient vertigo, abnormal eye movements and gait instability despite cessation of Nivolumab that started in August 2023. 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 Pt completed 5 day course of solumedrol and IVIG w/ improvement in tone, hyperreflexia, and tremor noted.  In addition to managing her neurologic status, she also has required medical mgmt of her urinary retention which is improving, constipation improving, blood sugars management due to high dose steroids and long term taper, pain, and mental health      During his acute hospitalization, patient was seen and evaluated by  PT, OT and SLP consult service.  All specialties collectively recommended that patient would benefit from ongoing therapies in the acute inpatient rehabilitation setting.     ASSESSMENT      Discharge Assessment  How are you doing now that you are home?: \"I'm doing really good, I'm still a little off balance but I don't have that sick feeling\"  How are your symptoms? (Red Flag symptoms escalate to triage hotline per guidelines): " Improved  Do you feel your condition is stable enough to be safe at home until your provider visit?: Yes  Does the patient have their discharge instructions? : Yes  Does the patient have questions regarding their discharge instructions? : No  Were you started on any new medications or were there changes to any of your previous medications? : Yes  Does the patient have all of their medications?: Yes  Do you have questions regarding any of your medications? : No  Do you have all of your needed medical supplies or equipment (DME)?  (i.e. oxygen tank, CPAP, cane, etc.): Yes  Discharge follow-up appointment scheduled within 14 calendar days? : Yes  Discharge Follow Up Appointment Scheduled with?:  (Patient states she has multiple follow up appointments already scheduled with different specialists)    Post-op ([facility] Only)  If the patient had a surgery or procedure, do they have any questions for a nurse?: No    PLAN                                                      Outpatient Plan:      Follow Up Appointments  Neuroimmunology  Neurology follow up with [facility] team  PCP  PM&R    Future Appointments   Date Time Provider Department Center   3/25/2024  8:00 AM Lilliam Pemberton MD [facility]   6/14/2024  8:00 AM Josué Rangel DO [facility]         For any urgent concerns, please contact our 24 hour nurse triage line: [phone] ([phone])         Erika Rodriguez  Community Health Worker  Connected Care Resource [place], [facility]    *Connected Care Resource Team does NOT follow patient ongoing. Referrals are identified based on internal discharge reports and the outreach is to ensure patient has an understanding of their discharge instructions.

## 2024-01-29 ENCOUNTER — TELEPHONE (OUTPATIENT)
Dept: NEUROLOGY | Facility: CLINIC | Age: 64
End: 2024-01-29
Payer: COMMERCIAL

## 2024-01-29 NOTE — TELEPHONE ENCOUNTER
Patient erroneously scheduled with General Neuro. Should be scheduled with MS clinic instead. Need to reschedule appt with Dr. Rangel on 6/14/2024 as follows:    Appointment type: New MS  Provider: [place], or [place]  Return date: First available  Specialty phone number: [phone]  Additional appointment(s) needed: N/A  Additional Notes: N/A    Spoke with patient, she requested a call back in approx 1 hour.    Gigi Acosta on 1/29/2024 at 12:47 PM

## 2024-02-07 ENCOUNTER — LAB (OUTPATIENT)
Dept: LAB | Facility: CLINIC | Age: 64
End: 2024-02-07
Attending: PSYCHIATRY & NEUROLOGY
Payer: COMMERCIAL

## 2024-02-07 ENCOUNTER — OFFICE VISIT (OUTPATIENT)
Dept: NEUROLOGY | Facility: CLINIC | Age: 64
End: 2024-02-07
Attending: PSYCHIATRY & NEUROLOGY
Payer: COMMERCIAL

## 2024-02-07 ENCOUNTER — PRE VISIT (OUTPATIENT)
Dept: NEUROLOGY | Facility: CLINIC | Age: 64
End: 2024-02-07

## 2024-02-07 VITALS
OXYGEN SATURATION: 95 % | WEIGHT: 110.2 LBS | DIASTOLIC BLOOD PRESSURE: 77 MMHG | SYSTOLIC BLOOD PRESSURE: 126 MMHG | BODY MASS INDEX: 19.53 KG/M2 | HEIGHT: 63 IN | HEART RATE: 109 BPM

## 2024-02-07 DIAGNOSIS — Z87.39 H/O RHEUMATOID ARTHRITIS: ICD-10-CM

## 2024-02-07 DIAGNOSIS — G25.82 STIFF PERSON SYNDROME: Primary | ICD-10-CM

## 2024-02-07 DIAGNOSIS — R26.81 GAIT INSTABILITY: ICD-10-CM

## 2024-02-07 DIAGNOSIS — G25.82 STIFF PERSON SYNDROME: ICD-10-CM

## 2024-02-07 LAB
ANION GAP SERPL CALCULATED.3IONS-SCNC: 11 MMOL/L (ref 7–15)
BUN SERPL-MCNC: 27.2 MG/DL (ref 8–23)
CALCIUM SERPL-MCNC: 9.7 MG/DL (ref 8.8–10.2)
CHLORIDE SERPL-SCNC: 105 MMOL/L (ref 98–107)
CREAT SERPL-MCNC: 1.01 MG/DL (ref 0.51–0.95)
DEPRECATED HCO3 PLAS-SCNC: 21 MMOL/L (ref 22–29)
EGFRCR SERPLBLD CKD-EPI 2021: 63 ML/MIN/1.73M2
GLUCOSE SERPL-MCNC: 157 MG/DL (ref 70–99)
POTASSIUM SERPL-SCNC: 4.5 MMOL/L (ref 3.4–5.3)
SODIUM SERPL-SCNC: 137 MMOL/L (ref 135–145)

## 2024-02-07 PROCEDURE — 99417 PROLNG OP E/M EACH 15 MIN: CPT | Performed by: PSYCHIATRY & NEUROLOGY

## 2024-02-07 PROCEDURE — G0463 HOSPITAL OUTPT CLINIC VISIT: HCPCS | Performed by: PSYCHIATRY & NEUROLOGY

## 2024-02-07 PROCEDURE — 99215 OFFICE O/P EST HI 40 MIN: CPT | Performed by: PSYCHIATRY & NEUROLOGY

## 2024-02-07 PROCEDURE — 80048 BASIC METABOLIC PNL TOTAL CA: CPT | Performed by: PATHOLOGY

## 2024-02-07 PROCEDURE — 36415 COLL VENOUS BLD VENIPUNCTURE: CPT | Performed by: PATHOLOGY

## 2024-02-07 RX ORDER — BACLOFEN 10 MG/1
TABLET ORAL
Qty: 60 TABLET | Refills: 5 | Status: SHIPPED | OUTPATIENT
Start: 2024-02-07 | End: 2024-08-08

## 2024-02-07 ASSESSMENT — PAIN SCALES - GENERAL: PAINLEVEL: NO PAIN (0)

## 2024-02-07 NOTE — PATIENT INSTRUCTIONS
You have struggled with tremors, stiffness/jumpiness, and vision changes  These are symptoms that can occur with stiff person syndrome   But your test for this condition was minimally positive  To confirm that this is stiff person syndrome I have recommended you do an EMG looking at the muscle activity     Blood work today to check your kidney function     Start ivig as this can help with symptoms related to stiff person syndrome   Talk to the pharmacist to get this set up -- video     I also recommend you take baclofen   Start with 1/2 tablet twice per day   If still stiff after one week, increase to 1 tablet twice per day   However, let me know if this makes you too sleepy     Continue clonazepam - let me know when you need a refill    Follow up with me after the EMG (ok if it is same day)

## 2024-02-07 NOTE — PROGRESS NOTES
Date of Service: 2/7/2024    [facility] Neurology   MS Clinic Evaluation    Subjective: 62 y/o woman with melanoma of the abdomen with metastasis to lymph nodes s/p adjuvant chemotherapy and subsequently nivolumab x 1 year who presents in follow up with neurology after admission for progressive neurologic symptoms.    She experienced 4 months of progressive symptoms.  This included lightheadedness, vertigo sensation with head turning, significant nausea to the point that she lost 25 pounds of weight.  Gait became very unsteady.  At her worst she required the assistance of 2 people to walk a short distance because of severe imbalance.  She suffered generalized shaking to the point that it interfered with her fine motor activities and also affected her gait.  Shaking affected the arms, legs and the cord.  She became very jumpy.  Light touch or noises would cause her to jump.  She also developed a horizontal diplopia that was worse when looking far away.  Her eyes were noted to shake when she was seen by neurologist initially.    Symptoms peaked in January at which point she was hospitalized for an expedited workup.  CSF was remarkable for inflammatory changes, though MRI was relatively unremarkable aside from some general degenerative changes in the cervical spine.  There was concern for an inflammatory condition and she was treated with IV steroids and IVIG.  She tolerated treatment well and did notice significant improvement.  She estimates 80 to 90% improvement in symptoms.  She was transferred to acute rehab and then has subsequently been home.    She did recently have a couple of falls.  These occurred because she simply lost balance.  This has affected her confidence and she has started to use her walker more routinely.  She is shaking a bit more.  However she is generally able to perform her activities of daily living independently.  Does note that a simple activities such as laundry will cause fatigue.    She  has occasional back pain, but notes that this has not been a problem recently.    Taking prednisone.  Today is her last day of 40 mg daily and she will plan to reduce to 30 mg tomorrow    Is a positive family history of Sjogren's and connective tissue disorder in her daughter.  She has a niece who also has Sjogren's and lupus as well as a grand niece who has Erler's pelon syndrome.    The patient has a history of rheumatoid arthritis.  This was diagnosed in 2016.  She has previously been managed with Enbrel and methotrexate, though these treatments have been withheld given the current symptoms.    No Known Allergies    Current Outpatient Medications   Medication    acetaminophen (TYLENOL) 500 MG tablet    Alcohol Swabs PADS    baclofen (LIORESAL) 10 MG tablet    blood glucose (NO BRAND SPECIFIED) lancets standard    blood glucose (NO BRAND SPECIFIED) test strip    blood glucose monitoring (NO BRAND SPECIFIED) meter device kit    calcium carbonate 500 mg, elemental, (OSCAL) 500 MG tablet    citalopram (CELEXA) 20 MG tablet    clonazePAM (KLONOPIN) 0.5 MG tablet    clonazePAM (KLONOPIN) 1 MG tablet    folic acid (FOLVITE) 1 MG tablet    glucose (BD GLUCOSE) 4 g chewable tablet    insulin aspart (NOVOLOG PEN) 100 UNIT/ML pen    insulin aspart (NOVOLOG PEN) 100 UNIT/ML pen    insulin pen needle (32G X 4 MM) 32G X 4 MM miscellaneous    pantoprazole (PROTONIX) 40 MG EC tablet    polyethylene glycol (MIRALAX) 17 GM/Dose powder    potassium chloride ER (MICRO-K) 10 MEQ CR capsule    [START ON 2/8/2024] predniSONE (DELTASONE) 10 MG tablet    [START ON 2/22/2024] predniSONE (DELTASONE) 10 MG tablet    [START ON 2/15/2024] predniSONE (DELTASONE) 20 MG tablet    predniSONE (DELTASONE) 20 MG tablet    [START ON 2/29/2024] predniSONE (DELTASONE) 5 MG tablet    predniSONE (DELTASONE) 50 MG tablet    Sharps Container MISC    sulfamethoxazole-trimethoprim (BACTRIM DS) 800-160 MG tablet    Vitamin D3 (CHOLECALCIFEROL) 25 mcg (1000  "units) tablet    clonazePAM (KLONOPIN) 0.5 MG tablet    levothyroxine (SYNTHROID/LEVOTHROID) 50 MCG tablet     No current facility-administered medications for this visit.        Past medical, surgical, social and family history was personally reviewed. Pertinent details noted above.     Physical Examination:   /77 (BP Location: Left arm, Patient Position: Sitting, Cuff Size: Adult Small)   Pulse 109   Ht 1.589 m (5' 2.56\")   Wt 50 kg (110 lb 3.2 oz)   SpO2 95%   BMI 19.80 kg/m      General: no acute distress  Cranial nerves:   VFFC  PERRL w/no RAPD  EOM full w/no ALBERTO intermittent square wave jerks  Face symmetric  Hearing intact  No dysarthria   Motor:   Tone is normal   Bulk is normal   +fine action and postural tremor  No significant bradykinesia   No cogwheel rigidity    R L  Deltoid  5 5  Biceps  5 5  Triceps 5 5  Wrist ext 5 5  Finger ext 5 5  Finger abd 5 5    Hip flexion 5- 5-  Knee flexion 5 5  Knee ext 5 5  Ankle d/f 5 5    Reflexes: 4+ and symmetric throughout, babinski absent bilaterally but there is sustained clonus in both ankles  Sensory: vibration is mildly reduced in the toes, JPS normal in the toes   Romberg is present  Coordination: no ataxia or dysmetria but + fine tremor  Gait: very stiff gait with shortened stride, tandem gait is not assessed due to instability    Tests/Imaging:   CSF gad65 and dppx neg  Flow w b cells  Cytology negative for malignant cells, +lymphocytosis  Protein 85  32 wbc, lymphocytic  Study 12/14/23 - 2 ocb,    Nirav neg from serum   [place] serum test +gad65 0.07    Elevated cadmium 1.6 (ULN 0.6)      MRI Brain  1/2024-personal review, right cerebellar dva, otherwise unremarkable study aside from some atrophy and mild ischemic changes     MRI Cervical spine   1/2024 - notable degenerative changes but no evidence of myelomalacia    MRI Thoracic spine   N/d    Assessment: 63-year-old woman with a history of rheumatoid arthritis, metastatic melanoma status post " nivolumab who now presents with progressive neurologic symptoms.  CSF is positive for inflammatory changes.  ESTEPHANIA 65 antibody is minimally elevated.    Constellation of findings can be suggestive for stiff person syndrome, though her antibody titer in the serum is noted to be quite low.  Discussed how with stiff person syndrome the antibody titer is usually significantly elevated.    This does not completely rule out the possibility of stiff person syndrome, however before I consider initiation of chronic immunotherapy I have recommended that she undergo an EMG.  Rationale of the study was discussed.    I would like her to continue with the prednisone taper.  She is at high risk for suffering chronic adverse effects of prednisone use.    I would like her to start IVIG every 4 weeks.  Her next dose would be due on February 14.  A dose of 0.5 Gwen per kilogram every 4 weeks would be appropriate.  I will test her renal function today.    She still has significant muscle hyperactivity.  I recommended adding baclofen to her clonazepam regimen, though she is advised to monitor for hypersomnolence as a side effect.    Plan:   -EMG  - MTM referral to aid with IVIG coordination  - BMP today  - Baclofen 5 mg p.o. twice daily for 1 week then 10 mg twice daily  - Follow-up after EMG    Note was completed with the assistance of Dragon Fluency software which can often result in accidental word substitutions.     A total of 70 minutes on the date of service were spent in the care of this patient.   Kandy Madison MD on 2/7/2024 at 12:29 PM

## 2024-02-07 NOTE — LETTER
2/7/2024       RE: Shayy Cortez  [address]  [place] [address]       Dear Colleague,    Thank you for referring your patient, Shayy Cortez, to the [facility] [place] at [facility]. Please see a copy of my visit note below.    Date of Service: 2/7/2024    [facility] Neurology   MS Clinic Evaluation    Subjective: 62 y/o woman with melanoma of the abdomen with metastasis to lymph nodes s/p adjuvant chemotherapy and subsequently nivolumab x 1 year who presents in follow up with neurology after admission for progressive neurologic symptoms.    She experienced 4 months of progressive symptoms.  This included lightheadedness, vertigo sensation with head turning, significant nausea to the point that she lost 25 pounds of weight.  Gait became very unsteady.  At her worst she required the assistance of 2 people to walk a short distance because of severe imbalance.  She suffered generalized shaking to the point that it interfered with her fine motor activities and also affected her gait.  Shaking affected the arms, legs and the cord.  She became very jumpy.  Light touch or noises would cause her to jump.  She also developed a horizontal diplopia that was worse when looking far away.  Her eyes were noted to shake when she was seen by neurologist initially.    Symptoms peaked in January at which point she was hospitalized for an expedited workup.  CSF was remarkable for inflammatory changes, though MRI was relatively unremarkable aside from some general degenerative changes in the cervical spine.  There was concern for an inflammatory condition and she was treated with IV steroids and IVIG.  She tolerated treatment well and did notice significant improvement.  She estimates 80 to 90% improvement in symptoms.  She was transferred to acute rehab and then has subsequently been home.    She did recently have a couple of falls.  These  occurred because she simply lost balance.  This has affected her confidence and she has started to use her walker more routinely.  She is shaking a bit more.  However she is generally able to perform her activities of daily living independently.  Does note that a simple activities such as laundry will cause fatigue.    She has occasional back pain, but notes that this has not been a problem recently.    Taking prednisone.  Today is her last day of 40 mg daily and she will plan to reduce to 30 mg tomorrow    Is a positive family history of Sjogren's and connective tissue disorder in her daughter.  She has a niece who also has Sjogren's and lupus as well as a grand niece who has Erler's pelon syndrome.    The patient has a history of rheumatoid arthritis.  This was diagnosed in 2016.  She has previously been managed with Enbrel and methotrexate, though these treatments have been withheld given the current symptoms.    No Known Allergies    Current Outpatient Medications   Medication    acetaminophen (TYLENOL) 500 MG tablet    Alcohol Swabs PADS    baclofen (LIORESAL) 10 MG tablet    blood glucose (NO BRAND SPECIFIED) lancets standard    blood glucose (NO BRAND SPECIFIED) test strip    blood glucose monitoring (NO BRAND SPECIFIED) meter device kit    calcium carbonate 500 mg, elemental, (OSCAL) 500 MG tablet    citalopram (CELEXA) 20 MG tablet    clonazePAM (KLONOPIN) 0.5 MG tablet    clonazePAM (KLONOPIN) 1 MG tablet    folic acid (FOLVITE) 1 MG tablet    glucose (BD GLUCOSE) 4 g chewable tablet    insulin aspart (NOVOLOG PEN) 100 UNIT/ML pen    insulin aspart (NOVOLOG PEN) 100 UNIT/ML pen    insulin pen needle (32G X 4 MM) 32G X 4 MM miscellaneous    pantoprazole (PROTONIX) 40 MG EC tablet    polyethylene glycol (MIRALAX) 17 GM/Dose powder    potassium chloride ER (MICRO-K) 10 MEQ CR capsule    [START ON 2/8/2024] predniSONE (DELTASONE) 10 MG tablet    [START ON 2/22/2024] predniSONE (DELTASONE) 10 MG tablet     "[START ON 2/15/2024] predniSONE (DELTASONE) 20 MG tablet    predniSONE (DELTASONE) 20 MG tablet    [START ON 2/29/2024] predniSONE (DELTASONE) 5 MG tablet    predniSONE (DELTASONE) 50 MG tablet    Sharps Container MISC    sulfamethoxazole-trimethoprim (BACTRIM DS) 800-160 MG tablet    Vitamin D3 (CHOLECALCIFEROL) 25 mcg (1000 units) tablet    clonazePAM (KLONOPIN) 0.5 MG tablet    levothyroxine (SYNTHROID/LEVOTHROID) 50 MCG tablet     No current facility-administered medications for this visit.      Past medical, surgical, social and family history was personally reviewed. Pertinent details noted above.     Physical Examination:   /77 (BP Location: Left arm, Patient Position: Sitting, Cuff Size: Adult Small)   Pulse 109   Ht 1.589 m (5' 2.56\")   Wt 50 kg (110 lb 3.2 oz)   SpO2 95%   BMI 19.80 kg/m      General: no acute distress  Cranial nerves:   VFFC  PERRL w/no RAPD  EOM full w/no ALBERTO intermittent square wave jerks  Face symmetric  Hearing intact  No dysarthria   Motor:   Tone is normal   Bulk is normal   +fine action and postural tremor  No significant bradykinesia   No cogwheel rigidity    R L  Deltoid  5 5  Biceps  5 5  Triceps 5 5  Wrist ext 5 5  Finger ext 5 5  Finger abd 5 5    Hip flexion 5- 5-  Knee flexion 5 5  Knee ext 5 5  Ankle d/f 5 5    Reflexes: 4+ and symmetric throughout, babinski absent bilaterally but there is sustained clonus in both ankles  Sensory: vibration is mildly reduced in the toes, JPS normal in the toes   Romberg is present  Coordination: no ataxia or dysmetria but + fine tremor  Gait: very stiff gait with shortened stride, tandem gait is not assessed due to instability    Tests/Imaging:   CSF gad65 and dppx neg  Flow w b cells  Cytology negative for malignant cells, +lymphocytosis  Protein 85  32 wbc, lymphocytic  Study 12/14/23 - 2 ocb,    Nirav neg from serum   [place] serum test +gad65 0.07    Elevated cadmium 1.6 (ULN 0.6)      MRI Brain  1/2024-personal review, right " cerebellar dva, otherwise unremarkable study aside from some atrophy and mild ischemic changes     MRI Cervical spine   1/2024 - notable degenerative changes but no evidence of myelomalacia    MRI Thoracic spine   N/d    Assessment: 63-year-old woman with a history of rheumatoid arthritis, metastatic melanoma status post nivolumab who now presents with progressive neurologic symptoms.  CSF is positive for inflammatory changes.  ESTEPHANIA 65 antibody is minimally elevated.    Constellation of findings can be suggestive for stiff person syndrome, though her antibody titer in the serum is noted to be quite low.  Discussed how with stiff person syndrome the antibody titer is usually significantly elevated.    This does not completely rule out the possibility of stiff person syndrome, however before I consider initiation of chronic immunotherapy I have recommended that she undergo an EMG.  Rationale of the study was discussed.    I would like her to continue with the prednisone taper.  She is at high risk for suffering chronic adverse effects of prednisone use.    I would like her to start IVIG every 4 weeks.  Her next dose would be due on February 14.  A dose of 0.5 Gwen per kilogram every 4 weeks would be appropriate.  I will test her renal function today.    She still has significant muscle hyperactivity.  I recommended adding baclofen to her clonazepam regimen, though she is advised to monitor for hypersomnolence as a side effect.    Plan:   -EMG  - MTM referral to aid with IVIG coordination  - BMP today  - Baclofen 5 mg p.o. twice daily for 1 week then 10 mg twice daily  - Follow-up after EMG    Note was completed with the assistance of Dragon Fluency software which can often result in accidental word substitutions.     A total of 70 minutes on the date of service were spent in the care of this patient.   Kandy Madison MD on 2/7/2024 at 12:29 PM    Again, thank you for allowing me to participate in the care of  your patient.      Sincerely,    Kandy Madison MD

## 2024-02-07 NOTE — NURSING NOTE
Chief Complaint   Patient presents with    MS    New Patient       Establishing MS Care      Vitals were taken and medications were reconciled.   Niles Jamil, EMT  12:31 PM

## 2024-02-14 NOTE — PROGRESS NOTES
Medication Therapy Management (MTM) Encounter    ASSESSMENT:                            Medication Adherence/Access: No issues identified    Stiff Person Syndrome/Rheumatoid Arthritis:   Patient would benefit from IVIG treatment every 4 weeks as recommended by neurologist. Baseline labs reviewed. All patient's questions were answered. Education provided to the patient on medication dosing and potential side effects as well as the infusion process. Therapy plan created and [facility] intake forms completed. Of note, if home infusion is not an option, patient would like to infuse at [facility].     Anxiety:   May benefit from working with a therapist, patient declines at this time.     Hypothyroidism    Recommend patient stay consistent with when she is taking her levothyroxine. Last TSH was within normal limits.     Pneumocystis Prophylaxis:   Completed Bactrim DS prophylaxis as prescribed. Removed from medication list.     Supplements:   Stable.     Constipation:   Improved.     Hypokalemia:   Last potassium level within normal limits.     PLAN:                            Intake forms for [facility] completed. We discussed that if home infusion is not an option, we will proceed with having you infuse at [facility].   Dr. Madison would like you to reach out to her if you need refills of any medications.     Follow-up: 3/13 @8:30 am via phone     SUBJECTIVE/OBJECTIVE:                          Shayy Cortez is a 63 year old female called for an initial visit. She was referred to me from Dr. Madison.      Reason for visit: Initial MTM; IVIG coordination.    Allergies/ADRs: Reviewed in chart  Past Medical History: Reviewed in chart  Tobacco: She reports that she has never smoked. She has never used smokeless tobacco.  Alcohol: none  Social: retired nurse     Medication Adherence/Access: Has a medication list she follows while setting up her medications for the day. In the last week she  has not forgotten to take her medications. Notes her medicines are part of her routine twice daily so she rarely forgets.    Stiff Person Syndrome/Rheumatoid Arthritis:   - Prednisone Taper. Starting today, she is taking 20mg with a goal to taper down to 5mg once daily (which is the dose she was on for her Rheumatoid Arthritis). Patient is also using sliding scale insulin three times daily before meals and before bedtime. If blood sugar is <200; she does not take any insulin. She is also taking pantoprazole 40mg once daily while on prednisone.   - Baclofen 5mg twice daily then increase to 10mg twice daily if needed after 7 days. Does not feel very sleepy or groggy since starting. She is planning on changing to the 10mg dose starting this evening.    - Clonazepam 0.5mg during the day and 1mg at bedtime + 0.5mg as needed. Rarely uses the as needed dose. Since starting clonazepam she is not shaking as bad.  She has been able to walk around with her walker. Does have a bit more pain and achy in her legs and back  - Tylenol 1000mg as needed. Does not use this every day but is helpful for the aches/pains in her legs.   - She will be starting IVIG per neurologist. She did receive IVIG while in the hospital (1/14) and tolerated the infusions well. Within a few days of the IVIG infusions, she was back on her feet and was walking better with her walker. She would like to infuse either with home infusion or at [facility].     Anxiety:   - Citalopram 20mg once daily   - Also on clonazepam 0.5mg during the day and 1mg at bedtime + 0.5mg as needed.  Patient reports that her significant other feels she is more worried than normal. Notes she has been going through a lot but is not obsessing over things. Does not feel that a therapist or medication changes would be helpful right now. Overall she is feeling okay given everything that is going on.     Hypothyroidism    - Levothyroxine 50 mcg daily.   Patient is having the  following symptoms: none.  Notes that when she was on immunotherapy for cancer, her TSH spiked and she was started on levothyroxine. Patient takes levothyroxine with all her other morning medications. Eats about an hour after taking medications.      TSH   Date Value Ref Range Status   01/09/2024 3.34 0.30 - 4.20 uIU/mL Final     Pneumocystis Prophylaxis:   - Bactrim DS every Monday, Wednesday, Friday   Patient finished her 20 day course yesterday.     Supplements:   - Calcium carbonate 500mg twice daily with meals   - Folic Acid 1mg every day   - Vitamin D3 2000 units (2x 1000 units) every day     Constipation:   - Miralax 17g every day   Last bowel movement was yesterday. Notes that she was not moving around so she had bad constipation. Previously before the Miralax she would go every 8-9 days.     Hypokalemia:   - Potassium chloride ER 10mEq twice daily   Component      Latest Ref Rng 2/7/2024  2:15 PM   Potassium      3.4 - 5.3 mmol/L 4.5        Today's Vitals: There were no vitals taken for this visit.  ----------------  Post Discharge Medication Reconciliation Status: discharge medications reconciled and changed, per note/orders.    I spent 38 minutes with this patient today. All changes were made via verbal approval with Dr. Madison. A copy of the visit note was provided to the patient's provider(s).    A summary of these recommendations was sent via [employer].    Danyell Fregoso, Pharm.D., MPH  Medication Therapy Management Pharmacist   [facility]    Telemedicine Visit Details  Type of service:  Telephone visit  Start Time: 8:30 AM  End Time: 9:08 AM     Medication Therapy Recommendations  No medication therapy recommendations to display

## 2024-02-15 ENCOUNTER — HOME INFUSION (PRE-WILLOW HOME INFUSION) (OUTPATIENT)
Dept: PHARMACY | Facility: CLINIC | Age: 64
End: 2024-02-15
Payer: COMMERCIAL

## 2024-02-15 ENCOUNTER — VIRTUAL VISIT (OUTPATIENT)
Dept: NEUROLOGY | Facility: CLINIC | Age: 64
End: 2024-02-15
Attending: PSYCHIATRY & NEUROLOGY
Payer: COMMERCIAL

## 2024-02-15 DIAGNOSIS — K59.00 CONSTIPATION: ICD-10-CM

## 2024-02-15 DIAGNOSIS — Z29.89 NEED FOR PNEUMOCYSTIS PROPHYLAXIS: ICD-10-CM

## 2024-02-15 DIAGNOSIS — E03.9 HYPOTHYROIDISM: ICD-10-CM

## 2024-02-15 DIAGNOSIS — E87.6 HYPOKALEMIA: ICD-10-CM

## 2024-02-15 DIAGNOSIS — Z87.39 H/O RHEUMATOID ARTHRITIS: ICD-10-CM

## 2024-02-15 DIAGNOSIS — G25.82 STIFF-MAN SYNDROME: Primary | ICD-10-CM

## 2024-02-15 DIAGNOSIS — F41.9 ANXIETY: ICD-10-CM

## 2024-02-15 DIAGNOSIS — Z78.9 TAKES DIETARY SUPPLEMENTS: ICD-10-CM

## 2024-02-15 RX ORDER — HEPARIN SODIUM (PORCINE) LOCK FLUSH IV SOLN 100 UNIT/ML 100 UNIT/ML
5 SOLUTION INTRAVENOUS
OUTPATIENT
Start: 2024-02-15

## 2024-02-15 RX ORDER — ALBUTEROL SULFATE 90 UG/1
1-2 AEROSOL, METERED RESPIRATORY (INHALATION)
Start: 2024-02-15

## 2024-02-15 RX ORDER — METHYLPREDNISOLONE SODIUM SUCCINATE 125 MG/2ML
125 INJECTION, POWDER, LYOPHILIZED, FOR SOLUTION INTRAMUSCULAR; INTRAVENOUS
Start: 2024-02-15

## 2024-02-15 RX ORDER — HEPARIN SODIUM,PORCINE 10 UNIT/ML
5-20 VIAL (ML) INTRAVENOUS DAILY PRN
OUTPATIENT
Start: 2024-02-15

## 2024-02-15 RX ORDER — ACETAMINOPHEN 325 MG/1
650 TABLET ORAL ONCE
Start: 2024-02-15

## 2024-02-15 RX ORDER — ALBUTEROL SULFATE 0.83 MG/ML
2.5 SOLUTION RESPIRATORY (INHALATION)
OUTPATIENT
Start: 2024-02-15

## 2024-02-15 RX ORDER — DIPHENHYDRAMINE HCL 25 MG
50 CAPSULE ORAL ONCE
OUTPATIENT
Start: 2024-02-15

## 2024-02-15 RX ORDER — DIPHENHYDRAMINE HYDROCHLORIDE 50 MG/ML
50 INJECTION INTRAMUSCULAR; INTRAVENOUS
Start: 2024-02-15

## 2024-02-15 RX ORDER — EPINEPHRINE 1 MG/ML
0.3 INJECTION, SOLUTION, CONCENTRATE INTRAVENOUS EVERY 5 MIN PRN
OUTPATIENT
Start: 2024-02-15

## 2024-02-15 NOTE — Clinical Note
2/15/2024       RE: Shayy Cortez  [address]  [place] [address]     Dear Colleague,    Thank you for referring your patient, Shayy Cortez, to the [facility] [place] [facility]. Please see a copy of my visit note below.    Medication Therapy Management (MTM) Encounter    ASSESSMENT:                            Medication Adherence/Access: {adherencechoices:310805}    ***:  ***      PLAN:                            ***    Follow-up: {followuptest2:915980}    SUBJECTIVE/OBJECTIVE:                          Shayy Cortez is a 63 year old female called for an initial visit. She was referred to me from Dr. Madison. {mtmvisitdetails:426997}     Reason for visit: Initial MTM; IVIG coordination.    Allergies/ADRs: Reviewed in chart  Past Medical History: Reviewed in chart  Tobacco: She reports that she has never smoked. She has never used smokeless tobacco.  Alcohol: {ALCOHOL CONSUMPTION HX:252793}  {Social and Goals:400245}    Medication Adherence/Access: {fumedadherence:836432}    Stiff Person Syndrome:   - Prednisone Taper ***  - Baclofen 10mg tablets - 1/2 tablet twice daily then increase to 1 tablet twice daily if needed after 7 days ***   - Clonazepam 0.5mg during the day *** and *** 1mg at bedtime     Anxiety:   - Citalopram 20mg once daily     Hypothyroidism   - Levothyroxine 50 mcg daily.   Patient is having the following symptoms: {hypo/hyperthyroid:751474}.      TSH   Date Value Ref Range Status   01/09/2024 3.34 0.30 - 4.20 uIU/mL Final     Pneumocystis Prophylaxis:   - Bactrim DS every Monday, Wednesday, Friday     Supplements:   - Calcium carbonate 500mg twice daily with meals   - Folic Acid 1mg every day   - Vitamin D3 2000 units every day     *** Novolog ?    GERD:   - Pantoprazole 40mg every day     Constipation:   - Miralax 17g every day     ***:   - Potassium chloride ER 10mEq twice daily   Today's Vitals: There  "were no vitals taken for this visit.  ----------------  {DERICK?:994136}    I spent {mtm total time 3:630284} with this patient today. { :445077}. A copy of the visit note was provided to the patient's provider(s).    A summary of these recommendations {GIVEN/NOT GIVEN:016504}.    ***    Telemedicine Visit Details  Type of service:  {telemedvisitmtm:162445::\"Telephone visit\"}  Start Time: {video/phone visit start time:152948}  End Time: {video/phone visit end time:152948}     Medication Therapy Recommendations  No medication therapy recommendations to display       Medication Therapy Management (MTM) Encounter    ASSESSMENT:                            Medication Adherence/Access: {adherencechoices:256019}    ***:  ***  Hypothyroidism: Recommend consistency     PLAN:                            ***    Follow-up: {followuptest2:229108}    SUBJECTIVE/OBJECTIVE:                          Shayy Cortez is a 63 year old female called for an initial visit. She was referred to me from Dr. Madison.      Reason for visit: Initial MTM; IVIG coordination.    Allergies/ADRs: Reviewed in chart  Past Medical History: Reviewed in chart  Tobacco: She reports that she has never smoked. She has never used smokeless tobacco.  Alcohol: none  Social: retired nurse     Medication Adherence/Access: Has a medication list she follows and sets her medications up herself in a container for the day. In the last week she has not forgotten to take her medications. Notes her medicines are part of her routine twice daily so she rarely forgets.    Stiff Person Syndrome/Rheumatoid Arthritis:   - Prednisone Taper. Starting today, she is taking 20mg with a goal to taper down to 5mg once daily (which is the dose she was on for her Rheumatoid Arthritis)  Patient is also using sliding scale insulin three times daily before meals and before bedtime. If blood sugar is <200; she does not take any insulin. She is also taking pantoprazole 40mg once daily while on " prednisone.   - Baclofen 10mg tablets - 1/2 tablet twice daily then increase to 1 tablet twice daily if needed after 7 days. Does not feel very sleepy or groggy since starting. She is planning on changing to the 10mg dose this evening.    - Clonazepam 0.5mg during the day and 1mg at bedtime + 0.5mg as needed. Rarely uses the as needed dose. Since starting clonazepam she is not shaking as bad.    Reports she is not as shaky but she has been able to walk around with her walker.     Patient [facility] IVIG [facility] in the [facility] and [facility] a few days was back on her feet ***    Anxiety:   - Citalopram 20mg once daily   - Also on clonazepam 0.5mg during the day and 1mg at bedtime + 0.5mg as needed.  Patient reports that her significant other feels she is more worried than normal. Notes she has been going through a lot but is not obsessing over things. Does not feel that a therapist or medication changes would be helpful right now. Overall she is feeling okay given everything that is going on.       Hypothyroidism   - Levothyroxine 50 mcg daily.   Patient is having the following symptoms: none.  Notes that when she was on immunotherapy for cancer, her TSH spiked and she was started on levothyroxine. Patient takes levothyroxine with all her other morning medications. Eats about an hour after taking medications.      TSH   Date Value Ref Range Status   01/09/2024 3.34 0.30 - 4.20 uIU/mL Final     Pneumocystis Prophylaxis:   - Bactrim DS every Monday, Wednesday, Friday   Patient finished her 20 day course yesterday.     Supplements:   - Calcium carbonate 500mg twice daily with meals   - Folic Acid 1mg every day   - Vitamin D3 2000 units (2x 1000 units) every day     Constipation:   - Miralax 17g every day   Last bowel movement was yesterday. Notes that she was not moving around so she had bad constipation. Previously before the Miralax she would go every 8-9 days.     ***:   - Potassium chloride ER 10mEq twice daily   Today's  Vitals: There were no vitals taken for this visit.  ----------------  {DERICK?:053589}    I spent {mt total time 3:939526} with this patient today. All changes were made via verbal approval with Dr. Madison. A copy of the visit note was provided to the patient's provider(s).    A summary of these recommendations was sent via [employer]    Danyell Fregoso, Pharm.D., MPH  Medication Therapy Management Pharmacist   [facility]    Telemedicine Visit Details  Type of service:  Telephone visit  Start Time: 8:32 AM  End Time: {video/phone visit end time:690512}     Medication Therapy Recommendations  No medication therapy recommendations to display         Again, thank you for allowing me to participate in the care of your patient.      Sincerely,    Danyell Fregoso, [facility]

## 2024-02-15 NOTE — PATIENT INSTRUCTIONS
"Recommendations from today's MTM visit:                                                    MTM (medication therapy management) is a service provided by a clinical pharmacist designed to help you get the most of out of your medicines.   Today we reviewed what your medicines are for, how to know if they are working, that your medicines are safe and how to make your medicine regimen as easy as possible.      Intake forms for [facility] completed. We discussed that if home infusion is not an option, we will proceed with having you infuse at [facility].   Dr. Madison would like you to reach out to her if you need refills of any medications.     Follow-up: 3/13 @8:30 am via phone     It was great speaking with you today.  I value your experience and would be very thankful for your time in providing feedback in our clinic survey. In the next few days, you may receive an email or text message from [employer] with a link to a survey related to your  clinical pharmacist.\"     To schedule another MTM appointment, please call the clinic directly or you may call the MTM scheduling line at [phone].    My Clinical Pharmacist's contact information:                                                      Please feel free to contact me with any questions or concerns you have.      Danyell Fregoso, Pharm.D., MPH  Medication Therapy Management Pharmacist   [facility]   "

## 2024-02-15 NOTE — PROGRESS NOTES
Therapy: IVIG     Insurance: [facility]      Once auth is approved, pt should be covered at 100% with [facility] [employer] plan, no ded or OOP applies.       In reference to referral made on 2/15/24 to check IVIG coverage          Please contact Intake with any questions, [phone] or In Basket pool, [facility] ([id number]).

## 2024-02-19 ENCOUNTER — MYC MEDICAL ADVICE (OUTPATIENT)
Dept: NEUROLOGY | Facility: CLINIC | Age: 64
End: 2024-02-19
Payer: COMMERCIAL

## 2024-02-19 DIAGNOSIS — G25.82 STIFF-MAN SYNDROME: ICD-10-CM

## 2024-02-19 DIAGNOSIS — M62.89 MUSCLE STIFFNESS: ICD-10-CM

## 2024-02-19 DIAGNOSIS — F41.9 ANXIETY: ICD-10-CM

## 2024-02-19 NOTE — TELEPHONE ENCOUNTER
Spoke with Shayy. She was recently discharged from the hospital and needs several medications refilled.     Clonazepam: Takes 0.5 mg every morning, takes an additional 0.5 mg PRN (rarely needed, has used once), and takes 1 mg at bedtime.     In addition, her citalopram dose was reduced in the hospital, to 20 mg.     Currently on prednisone taper, started 20 mg dose on Feb 15 and is to start 10 mg on Feb 22. Plan to reduce to 5 mg on Feb 29. Currently has enough 10 mg tablets to last until the reduction to 5 mg, but does not have any 5 mg tablets. Rx for 5 mg was sent to [facility] at discharge, to start on 2/29, but needs this filled at [facility].     Pending requests to Dr Madison.    Prudence Quiles RN

## 2024-02-19 NOTE — TELEPHONE ENCOUNTER
[facility] Call Center    Phone Message    May a detailed message be left on voicemail: yes     Reason for Call: Other: Patient is calling and states she needs refills of Clonazepam. She states she just recently was in the hospital and only got a 30 day supply. She states she needs more refills but not sure what Dr. Madison would order. Please contact patient to discuss at [phone]      Action Taken: Message routed to:  [facility] Neurology    Travel Screening: Not Applicable

## 2024-02-20 RX ORDER — CLONAZEPAM 1 MG/1
1 TABLET ORAL AT BEDTIME
Qty: 60 TABLET | Refills: 5 | Status: SHIPPED | OUTPATIENT
Start: 2024-02-20 | End: 2024-08-19

## 2024-02-20 RX ORDER — CITALOPRAM HYDROBROMIDE 20 MG/1
20 TABLET ORAL DAILY
Qty: 30 TABLET | Refills: 11 | Status: SHIPPED | OUTPATIENT
Start: 2024-02-20 | End: 2024-04-23

## 2024-02-20 RX ORDER — CLONAZEPAM 0.5 MG/1
0.5 TABLET ORAL DAILY PRN
Qty: 30 TABLET | Refills: 5 | Status: SHIPPED | OUTPATIENT
Start: 2024-02-20

## 2024-02-20 RX ORDER — CLONAZEPAM 0.5 MG/1
0.5 TABLET ORAL DAILY
Qty: 60 TABLET | Refills: 5 | Status: SHIPPED | OUTPATIENT
Start: 2024-02-20 | End: 2024-09-04

## 2024-02-20 RX ORDER — PREDNISONE 5 MG/1
5 TABLET ORAL EVERY MORNING
Qty: 30 TABLET | Refills: 3 | Status: SHIPPED | OUTPATIENT
Start: 2024-02-29 | End: 2024-06-26

## 2024-02-20 NOTE — TELEPHONE ENCOUNTER
Called and spoke with infusion pharmacy team at [facility]. They confirmed patient received Privigen while in-patient.     Danyell Fregoso, Pharm.D., MPH  Medication Therapy Management Pharmacist   [facility]

## 2024-02-20 NOTE — TELEPHONE ENCOUNTER
[facility] Call Center    Phone Message    May a detailed message be left on voicemail: yes     Reason for Call: Other: Pharmacy is calling to clarify clonazepam prescriptions and clarify prednisone rx's. Please contact pharmacy back at [phone]     Action Taken: Message routed to:  [facility]    Travel Screening: Not Applicable

## 2024-02-21 NOTE — TELEPHONE ENCOUNTER
Spoke with pharmacist at [facility]. Insurance will not cover three separate clonazepam Rx's.  Pharmacist will combine the 0.5 mg prescriptions (Shayy takes one 0.5 mg tablet daily and has one 0.5 mg tablet as needed),  Will keep the 1 mg tablet (taken nightly) as is.    Erika Lebron RN

## 2024-02-24 ENCOUNTER — HEALTH MAINTENANCE LETTER (OUTPATIENT)
Age: 64
End: 2024-02-24

## 2024-02-29 ENCOUNTER — CARE COORDINATION (OUTPATIENT)
Dept: PHARMACY | Facility: CLINIC | Age: 64
End: 2024-02-29
Payer: COMMERCIAL

## 2024-02-29 NOTE — PROGRESS NOTES
Referred to [facility]    Shayy Cortez, 1960  Medication (name, frequency and route):  Privigen [id number]   Start of Care Date: 3/5/24 (Confirmed with patient)  Infusion location: Patient Home  Skilled Nursing will be provided by: [facility]  @ [phone]    Monserrat Belle RN

## 2024-03-05 ENCOUNTER — DOCUMENTATION ONLY (OUTPATIENT)
Dept: PHARMACY | Facility: CLINIC | Age: 64
End: 2024-03-05
Payer: COMMERCIAL

## 2024-03-05 ENCOUNTER — DOCUMENTATION ONLY (OUTPATIENT)
Dept: NEUROLOGY | Facility: CLINIC | Age: 64
End: 2024-03-05
Payer: COMMERCIAL

## 2024-03-05 NOTE — PROGRESS NOTES
Skilled Nurse visit in the Patient Home to administer Privigen 25g.  No recent elevated temperature, fever, chills, productive cough, coughing for 3 weeks or longer or hemoptysis, abnormal vital signs, night sweats, chest pain. No  decrease in your appetite, unexplained weight loss or fatigue.  No other new onset medical symptoms.  Current weight 112#.  Port-a-Cath accessed, 1 attempt, blood return verified.  Pre medicated with acetaminophen 650mg PO, diphenhydramine 50mg PO, and solu-cortef 100mg IVP.  No labs drawn.  Infusion completed without complication or reaction. Pt believes therapy is helping to manage symptoms related to therapy, but infusion/diagnosis is new to patient.    Thuy Freitas RN, BSN  [facility]  [phone]  [email or web address]

## 2024-03-05 NOTE — PROGRESS NOTES
Prescriber orders have been received from [facility], orders have been placed in Dr. Madison's folder for review and signature.   Niles Jamil EMT 03/05/2024 8:26AM

## 2024-03-06 ENCOUNTER — MEDICAL CORRESPONDENCE (OUTPATIENT)
Dept: HEALTH INFORMATION MANAGEMENT | Facility: CLINIC | Age: 64
End: 2024-03-06
Payer: COMMERCIAL

## 2024-03-07 ENCOUNTER — DOCUMENTATION ONLY (OUTPATIENT)
Dept: NEUROLOGY | Facility: CLINIC | Age: 64
End: 2024-03-07
Payer: COMMERCIAL

## 2024-03-07 NOTE — PROGRESS NOTES
Prescription orders have been signed and faxed back at [phone].  Niles BNEITEZ 03/07/2024 7:55AM

## 2024-03-07 NOTE — PROGRESS NOTES
Care plan/treatment orders have been received from [facility], orders placed in Dr. Madison's folder for review and signature.   Niles Jamil EMT 03/07/2024 11:53AM

## 2024-03-08 ENCOUNTER — MEDICAL CORRESPONDENCE (OUTPATIENT)
Dept: NEUROLOGY | Facility: CLINIC | Age: 64
End: 2024-03-08
Payer: COMMERCIAL

## 2024-03-08 NOTE — PROGRESS NOTES
Care plan/treatment orders have been signed and faxed back at [phone].  Niles BENITEZ 03/08/2024 9:07AM

## 2024-03-19 ENCOUNTER — TELEPHONE (OUTPATIENT)
Dept: PHYSICAL MEDICINE AND REHAB | Facility: CLINIC | Age: 64
End: 2024-03-19
Payer: COMMERCIAL

## 2024-03-19 NOTE — TELEPHONE ENCOUNTER
CARLOS Lucas asking if she could moce up her appointment to earlier in the day with either a 10am slot or a 11:30am slot available. Asked her to give us a call back at [phone].    Jones Maya  Visit Facilitator  [facility]

## 2024-03-22 ENCOUNTER — OFFICE VISIT (OUTPATIENT)
Dept: PHYSICAL MEDICINE AND REHAB | Facility: CLINIC | Age: 64
End: 2024-03-22
Payer: COMMERCIAL

## 2024-03-22 VITALS — DIASTOLIC BLOOD PRESSURE: 65 MMHG | SYSTOLIC BLOOD PRESSURE: 120 MMHG | HEART RATE: 89 BPM | OXYGEN SATURATION: 97 %

## 2024-03-22 DIAGNOSIS — R26.9 ABNORMAL GAIT: ICD-10-CM

## 2024-03-22 DIAGNOSIS — G25.82 STIFF-MAN SYNDROME: Primary | ICD-10-CM

## 2024-03-22 DIAGNOSIS — M62.838 SPASM OF MUSCLE: ICD-10-CM

## 2024-03-22 PROCEDURE — 99203 OFFICE O/P NEW LOW 30 MIN: CPT | Performed by: PHYSICAL MEDICINE & REHABILITATION

## 2024-03-22 NOTE — LETTER
3/22/2024       RE: Shayy Cortez  [address]  [facility] [address]     Dear Colleague,    Thank you for referring your patient, Shayy Cortez, to the [facility] at [facility]. Please see a copy of my visit note below.    CC: Gait disturbance, muscle pain and dizziness in the setting of stiff person syndrome and rheumatoid arthritis.    63 year old female with PMH of cutaneous melanoma of the abdomen s/p one year treatment of nivolumab with development of progressive tremor, transient vertigo, abnormal eye movements and gait instability despite cessation of Nivolumab that started in August 2023. 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 Pt completed 5 day course of solumedrol and IVIG w/ improvement in tone, hyperreflexia, and tremor noted.  She [facility] an [facility] from 1/18/2024 to 1/26/2024.  She was discharged home.  In addition to managing her neurologic status, she also has required medical mgmt of her urinary retention which is improving, constipation improving, blood sugars management due to high dose steroids and long term taper, pain, and mental health.    Her significant other who accompanies her notes that he has noticed continued problems with memory, problem-solving and thinking in general.  Even though she is better compared to when she was at the ARU, her persistent issues frustrate her.  She is able to walk without walker but feels unsteady and shuffling.  She has seen her ophthalmologist and currently is without additional interventions.  She is to follow-up with her neurologist and is awaiting an EMG.    Current Outpatient Medications   Medication Sig Dispense Refill    acetaminophen (TYLENOL) 500 MG tablet Take 500-1,000 mg by mouth every 6 hours as needed for mild pain      baclofen (LIORESAL) 10 MG tablet Take 0.5 tablets (5 mg) by mouth 2 times daily for 7 days, THEN 1 tablet (10 mg) 2  times daily for 180 days. 60 tablet 5    calcium carbonate 500 mg, elemental, (OSCAL) 500 MG tablet Take 1 tablet (500 mg) by mouth 2 times daily (with meals) 120 tablet 0    citalopram (CELEXA) 20 MG tablet Take 1 tablet (20 mg) by mouth daily 30 tablet 11    clonazePAM (KLONOPIN) 0.5 MG tablet Take 1 tablet (0.5 mg) by mouth daily 60 tablet 5    clonazePAM (KLONOPIN) 0.5 MG tablet Take 1 tablet (0.5 mg) by mouth daily as needed for anxiety (Tremors) 30 tablet 5    clonazePAM (KLONOPIN) 1 MG tablet Take 1 tablet (1 mg) by mouth at bedtime 60 tablet 5    folic acid (FOLVITE) 1 MG tablet Take 1 tablet (1,000 mcg) by mouth daily 30 tablet 0    immune globulin - sucrose free 10 % injection Inject 25.7 g into the vein every 28 (twenty-eight) days      levothyroxine (SYNTHROID/LEVOTHROID) 50 MCG tablet Take 1 tablet (50 mcg) by mouth daily 30 tablet 0    pantoprazole (PROTONIX) 40 MG EC tablet Take 1 tablet (40 mg) by mouth every morning (before breakfast) 30 tablet 0    polyethylene glycol (MIRALAX) 17 GM/Dose powder Take 17 g by mouth daily 510 g 0    potassium chloride ER (MICRO-K) 10 MEQ CR capsule Take 1 capsule (10 mEq) by mouth 2 times daily 60 capsule 0    predniSONE (DELTASONE) 5 MG tablet Take 1 tablet (5 mg) by mouth every morning 30 tablet 3    Sharps Container MISC Use as directed to dispose of needles, lancets and other sharps 1 each 0    Vitamin D3 (CHOLECALCIFEROL) 25 mcg (1000 units) tablet Take 2 tablets (50 mcg) by mouth daily 60 tablet 0    Alcohol Swabs PADS Use to swab the area of the injection or khalida as directed Per insurance coverage 100 each 0    blood glucose (NO BRAND SPECIFIED) lancets standard Use to test blood sugar 4 times daily or as directed. Henok Contour Microlet lancets 120 Lancet 0    blood glucose (NO BRAND SPECIFIED) test strip To use to test glucose level in the blood Use to test blood sugar  4 times daily as directed. To accompany glucose monitor brands per insurance coverage.  100 strip 0    blood glucose monitoring (NO BRAND SPECIFIED) meter device kit Use as directed Per insurance coverage 1 kit 0    glucose (BD GLUCOSE) 4 g chewable tablet Take 4 tablets by mouth every 15 minutes as needed for low blood sugar 50 tablet 0    insulin aspart (NOVOLOG PEN) 100 UNIT/ML pen Inject 1-3 Units Subcutaneous 3 times daily (before meals) 15 mL 0    insulin aspart (NOVOLOG PEN) 100 UNIT/ML pen Inject 1-3 Units Subcutaneous at bedtime 15 mL 0    insulin pen needle (32G X 4 MM) 32G X 4 MM miscellaneous Use as directed by provider Per insurance coverage 100 each 0    predniSONE (DELTASONE) 10 MG tablet Take 1 tablet (10 mg) by mouth daily 7 tablet 0    predniSONE (DELTASONE) 20 MG tablet Take 1 tablet (20 mg) by mouth daily 7 tablet 0      /65   Pulse 89   SpO2 97%     On examination, patient is alert and cooperative.  Her vitals are stable.  She is afebrile.    Speech is fluent.  Affect is pleasant.    HEENT is negative.  Extraocular movements are intact.  Face is symmetric.  Tongue is midline.  Neck is supple.    She is able to move her upper extremities functionally.  She is able to carry out straight leg raising test.  She is able to do Satish's.    Neurologically she has no sensory deficits.  She has good strength.  She has increased tone with clonus at the ankles.  Tone is increased in the hamstrings bilaterally and the gastrocnemius bilaterally.  Antony's positive.  Plantars are positive.    No focal areas of tenderness were elicited at either the cervical/lumbar/sacroiliac region.  Romberg is negative.    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  Her current hypertonia and stiffness is interfering with her gait and it may be reasonable to start treating her with  neurotoxin focally at the hamstrings and the gastrocnemius.  I would request proceeding with 300 units of Botox to start with.  She may need 400 units eventually.  Anticipate onset of action within 1 to 3 days, peak in 2 weeks and a duration of 12 weeks.  If she gets good relief from this, this could be continued on a regular basis.    This was reviewed at length with the patient and her significant other.  All questions were answered to their satisfaction.  40 minutes spent for this visit, greater than 50% was for counseling above-mentioned issues.        Again, thank you for allowing me to participate in the care of your patient.      Sincerely,    Escobar Felix MD

## 2024-03-22 NOTE — PROGRESS NOTES
CC: Gait disturbance, muscle pain and dizziness in the setting of stiff person syndrome and rheumatoid arthritis.    63 year old female with PMH of cutaneous melanoma of the abdomen s/p one year treatment of nivolumab with development of progressive tremor, transient vertigo, abnormal eye movements and gait instability despite cessation of Nivolumab that started in August 2023. 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 Pt completed 5 day course of solumedrol and IVIG w/ improvement in tone, hyperreflexia, and tremor noted.  She was an inpatient at the [facility] from 1/18/2024 to 1/26/2024.  She was discharged home.  In addition to managing her neurologic status, she also has required medical mgmt of her urinary retention which is improving, constipation improving, blood sugars management due to high dose steroids and long term taper, pain, and mental health.    Her significant other who accompanies her notes that he has noticed continued problems with memory, problem-solving and thinking in general.  Even though she is better compared to when she was at the ARU, her persistent issues frustrate her.  She is able to walk without walker but feels unsteady and shuffling.  She has seen her ophthalmologist and currently is without additional interventions.  She is to follow-up with her neurologist and is awaiting an EMG.    Current Outpatient Medications   Medication Sig Dispense Refill    acetaminophen (TYLENOL) 500 MG tablet Take 500-1,000 mg by mouth every 6 hours as needed for mild pain      baclofen (LIORESAL) 10 MG tablet Take 0.5 tablets (5 mg) by mouth 2 times daily for 7 days, THEN 1 tablet (10 mg) 2 times daily for 180 days. 60 tablet 5    calcium carbonate 500 mg, elemental, (OSCAL) 500 MG tablet Take 1 tablet (500 mg) by mouth 2 times daily (with meals) 120 tablet 0    citalopram (CELEXA) 20 MG tablet Take 1 tablet (20 mg) by mouth daily 30 tablet 11    clonazePAM (KLONOPIN) 0.5 MG tablet  Take 1 tablet (0.5 mg) by mouth daily 60 tablet 5    clonazePAM (KLONOPIN) 0.5 MG tablet Take 1 tablet (0.5 mg) by mouth daily as needed for anxiety (Tremors) 30 tablet 5    clonazePAM (KLONOPIN) 1 MG tablet Take 1 tablet (1 mg) by mouth at bedtime 60 tablet 5    folic acid (FOLVITE) 1 MG tablet Take 1 tablet (1,000 mcg) by mouth daily 30 tablet 0    immune globulin - sucrose free 10 % injection Inject 25.7 g into the vein every 28 (twenty-eight) days      levothyroxine (SYNTHROID/LEVOTHROID) 50 MCG tablet Take 1 tablet (50 mcg) by mouth daily 30 tablet 0    pantoprazole (PROTONIX) 40 MG EC tablet Take 1 tablet (40 mg) by mouth every morning (before breakfast) 30 tablet 0    polyethylene glycol (MIRALAX) 17 GM/Dose powder Take 17 g by mouth daily 510 g 0    potassium chloride ER (MICRO-K) 10 MEQ CR capsule Take 1 capsule (10 mEq) by mouth 2 times daily 60 capsule 0    predniSONE (DELTASONE) 5 MG tablet Take 1 tablet (5 mg) by mouth every morning 30 tablet 3    Sharps Container MISC Use as directed to dispose of needles, lancets and other sharps 1 each 0    Vitamin D3 (CHOLECALCIFEROL) 25 mcg (1000 units) tablet Take 2 tablets (50 mcg) by mouth daily 60 tablet 0    Alcohol Swabs PADS Use to swab the area of the injection or khalida as directed Per insurance coverage 100 each 0    blood glucose (NO BRAND SPECIFIED) lancets standard Use to test blood sugar 4 times daily or as directed. Henok Contour Microlet lancets 120 Lancet 0    blood glucose (NO BRAND SPECIFIED) test strip To use to test glucose level in the blood Use to test blood sugar  4 times daily as directed. To accompany glucose monitor brands per insurance coverage. 100 strip 0    blood glucose monitoring (NO BRAND SPECIFIED) meter device kit Use as directed Per insurance coverage 1 kit 0    glucose (BD GLUCOSE) 4 g chewable tablet Take 4 tablets by mouth every 15 minutes as needed for low blood sugar 50 tablet 0    insulin aspart (NOVOLOG PEN) 100 UNIT/ML  pen Inject 1-3 Units Subcutaneous 3 times daily (before meals) 15 mL 0    insulin aspart (NOVOLOG PEN) 100 UNIT/ML pen Inject 1-3 Units Subcutaneous at bedtime 15 mL 0    insulin pen needle (32G X 4 MM) 32G X 4 MM miscellaneous Use as directed by provider Per insurance coverage 100 each 0    predniSONE (DELTASONE) 10 MG tablet Take 1 tablet (10 mg) by mouth daily 7 tablet 0    predniSONE (DELTASONE) 20 MG tablet Take 1 tablet (20 mg) by mouth daily 7 tablet 0      /65   Pulse 89   SpO2 97%     On examination, patient is alert and cooperative.  Her vitals are stable.  She is afebrile.    Speech is fluent.  Affect is pleasant.    HEENT is negative.  Extraocular movements are intact.  Face is symmetric.  Tongue is midline.  Neck is supple.    She is able to move her upper extremities functionally.  She is able to carry out straight leg raising test.  She is able to do Satish's.    Neurologically she has no sensory deficits.  She has good strength.  She has increased tone with clonus at the ankles.  Tone is increased in the hamstrings bilaterally and the gastrocnemius bilaterally.  Antony's positive.  Plantars are positive.    No focal areas of tenderness were elicited at either the cervical/lumbar/sacroiliac region.  Romberg is negative.    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  Her current hypertonia and stiffness is interfering with her gait and it may be reasonable to start treating her with neurotoxin focally at the hamstrings and the gastrocnemius.  I would request proceeding with 300 units of Botox to start with.  She may need 400 units eventually.  Anticipate onset of action within 1 to 3 days, peak in 2 weeks and a duration of 12 weeks.  If she gets good relief from this, this could be  continued on a regular basis.    This was reviewed at length with the patient and her significant other.  All questions were answered to their satisfaction.  40 minutes spent for this visit, greater than 50% was for counseling above-mentioned issues.    Escobar Felix MD

## 2024-03-22 NOTE — NURSING NOTE
"Shayy Cortez is a 63 year old female who presents for:  Chief Complaint   Patient presents with    Consult     Feeling lightheaded - thinks possibly caused by sleep, no longer needs OT, still doing PT weekly. Sees double on occasion. Gait looks a little unbalanced today. Bilat upper legs are sore        Initial Vitals:  /65   Pulse 89   SpO2 97%  Estimated body mass index is 19.8 kg/m  as calculated from the following:    Height as of 2/7/24: 1.589 m (5' 2.56\").    Weight as of 2/7/24: 50 kg (110 lb 3.2 oz).. There is no height or weight on file to calculate BSA. BP completed using cuff size: regular  Data Unavailable    Nursing Comments:     Jones Maya  "

## 2024-04-01 ENCOUNTER — VIRTUAL VISIT (OUTPATIENT)
Dept: NEUROLOGY | Facility: CLINIC | Age: 64
End: 2024-04-01
Attending: PSYCHIATRY & NEUROLOGY
Payer: COMMERCIAL

## 2024-04-01 DIAGNOSIS — F41.9 ANXIETY: ICD-10-CM

## 2024-04-01 DIAGNOSIS — Z87.39 H/O RHEUMATOID ARTHRITIS: ICD-10-CM

## 2024-04-01 DIAGNOSIS — G25.82 STIFF-MAN SYNDROME: Primary | ICD-10-CM

## 2024-04-01 PROCEDURE — 99207 PR NO BILLABLE SERVICE THIS VISIT: CPT | Mod: 93 | Performed by: PHARMACIST

## 2024-04-01 NOTE — Clinical Note
4/1/2024       RE: Shayy Cortez  [address]  [facility] [address]     Dear Colleague,    Thank you for referring your patient, Shayy Cortez, to the [facility] [place] [facility]. Please see a copy of my visit note below.    Medication Therapy Management (MTM) Encounter    ASSESSMENT:                            Medication Adherence/Access: {adherencechoices:809556}    ***:   ***    PLAN:                            ***    Follow-up: {followuptest2:120937}    SUBJECTIVE/OBJECTIVE:                          Shayy Cortez is a 63 year old female called for a follow-up visit. {mtmvisitdetails:604022}      Reason for visit: ***.    Allergies/ADRs: {1/2/3/4/5:541714}  Past Medical History: {1/2/3/4/5:177534}  Tobacco: She reports that she has never smoked. She has never used smokeless tobacco.  Alcohol: {ALCOHOL CONSUMPTION HX:519321}  {Social and Goals:234470}  Medication Adherence/Access: {fumedadherence:286734}    Stiff Person Syndrome/Rheumatoid Arthritis:   - Prednisone Taper. Starting today, she is taking 20mg with a goal to taper down to 5mg once daily (which is the dose she was on for her Rheumatoid Arthritis). Patient is also using sliding scale insulin three times daily before meals and before bedtime. If blood sugar is <200; she does not take any insulin. She is also taking pantoprazole 40mg once daily while on prednisone.   - Baclofen 5mg twice daily then increase to 10mg twice daily if needed after 7 days. Does not feel very sleepy or groggy since starting. She is planning on changing to the 10mg dose starting this evening.    - Clonazepam 0.5mg during the day and 1mg at bedtime + 0.5mg as needed. Rarely uses the as needed dose. Since starting clonazepam she is not shaking as bad.  She has been able to walk around with her walker. Does have a bit more pain and achy in her legs and back  - Tylenol 1000mg as needed. Does  "not use this every day but is helpful for the aches/pains in her legs.   - She will be starting IVIG per neurologist. She did receive IVIG while in the hospital (1/14) and tolerated the infusions well. Within a few days of the IVIG infusions, she was back on her feet and was walking better with her walker. She would like to infuse either with home infusion or at [facility].     Privigen q4wks, last dose 3/7/24    Today's Vitals: There were no vitals taken for this visit.  ----------------  {DERICK?:486274}    I spent {mtm total time 3:494455} with this patient today{MTMpartdbillingquestion:889189}. { :937066}. A copy of the visit note was provided to the patient's provider(s).    A summary of these recommendations {GIVEN/NOT GIVEN:533047}.    ***    Telemedicine Visit Details  Type of service:  {telemedvisitmtm:369870::\"Telephone visit\"}  Start Time: {video/phone visit start time:152948}  End Time: {video/phone visit end time:152948}     Medication Therapy Recommendations  No medication therapy recommendations to display     Medication Therapy Management (MTM) Encounter    ASSESSMENT:                            Medication Adherence/Access: No issues identified    Stiff Person Syndrome/Rheumatoid Arthritis:   Patient is doing well on IVIG therapy; next dose is tomorrow.   Typically muscle relaxers are held for 24 hours prior to EMG but will defer to Dr. Madison on whether she recommends this for this patient given stiff person syndrome (patient sent Idomoo message to Dr. Madison with this question).    Anxiety:   Stable     PLAN:                            Next IVIG treatment is tomorrow (4/2/24)  Typically muscle relaxers are held for 24 hours prior to EMG but let's see what Dr. Madison says about whether you should stop these medications before your EMG   Follow up with Dr. Madison on 4/23/24     Follow-up: 6 months or sooner if needed     SUBJECTIVE/OBJECTIVE:                          Shayy Cortez is a 63 year " "old female called for a follow-up visit (previously seen by Danyell Fregoso PharmD).    Reason for visit: follow up on medications.    Allergies/ADRs: Reviewed in chart  Past Medical History: Reviewed in chart  Tobacco: She reports that she has never smoked. She has never used smokeless tobacco.  Alcohol: none    Medication Adherence/Access: no issues reported    Stiff Person Syndrome/Rheumatoid Arthritis:   - IVIG every 4 weeks; last dose 3/7/24, next dose 4/2/24 (tomorrow)   - Prednisone 5 mg daily- has weaned down on the dose recently and back to her baseline RA dosing. No longer on insulin for steroid-induced hyperglycemia but she would like to leave the orders on her med list in case she needs to restart this.   - Baclofen 10 mg twice daily   - Clonazepam 0.5mg in the morning and 1mg at bedtime + 0.5 mg as needed  (rare use of PRN dose, only 3 times so far)   - Tylenol 1000mg as needed for pain/headache occasionally    Patient states she is still having a lot of dizziness/light-headedness and feeling unstable. This is not worse, but not better. She thought the clonazepam might be causing the dizziness but it started before she went on the clonazepam.  She is not driving due to dizziness and double vision.     She received IVIG inpatient and has had her first outpatient dose so far. States she tolerates the infusions well without side effects. She is hoping that the next dose helps with her feeling of instability and back pain. She states she is not having any shaking but does have some coldness in her legs. Overall, states she is \"1000 times better\" than before she odessa in the hospital. The IVIG helped her start walking again.     Patient is scheduled for Botox injections on Friday for tightness in her legs.   She is scheduled for EMG on 4/23 and is wondering if she needs to hold the muscle relaxers before the procedure.   She has applied for disability.     Anxiety:   - Citalopram 20 mg daily    Patient reports her " anxiety has been okay lately. She was previously on 40 mg of citalopram but dose was reduced in the hospital and she states this is working fine at the lower dosage.     Today's Vitals: There were no vitals taken for this visit.  ----------------    I spent 16 minutes with this patient today. I offer these suggestions for consideration by Dr. Madison. A copy of the visit note was provided to the patient's provider(s).    A summary of these recommendations was sent via [employer].    Mary Lord, Pharm.D.  Medication Therapy Management Pharmacist  [facility]    Telemedicine Visit Details  Type of service:  Telephone visit  Start Time:  10:02 AM  End Time: 10:18 AM     Medication Therapy Recommendations  No medication therapy recommendations to display       Again, thank you for allowing me to participate in the care of your patient.      Sincerely,    Mary Lord [facility]

## 2024-04-01 NOTE — PROGRESS NOTES
Medication Therapy Management (MTM) Encounter    ASSESSMENT:                            Medication Adherence/Access: No issues identified    Stiff Person Syndrome/Rheumatoid Arthritis:   Patient is doing well on IVIG therapy; next dose is tomorrow.   Typically muscle relaxers are held for 24 hours prior to EMG but will defer to Dr. Madison on whether she recommends this for this patient given stiff person syndrome (patient sent Photobucket message to Dr. Madison with this question).    Anxiety:   Stable     PLAN:                            Next IVIG treatment is tomorrow (4/2/24)  Typically muscle relaxers are held for 24 hours prior to EMG but let's see what Dr. Madison says about whether you should stop these medications before your EMG   Follow up with Dr. Madison on 4/23/24     Follow-up: 6 months or sooner if needed     SUBJECTIVE/OBJECTIVE:                          Shayy Cortez is a 63 year old female called for a follow-up visit (previously seen by Danyell Fregoso PharmD).    Reason for visit: follow up on medications.    Allergies/ADRs: Reviewed in chart  Past Medical History: Reviewed in chart  Tobacco: She reports that she has never smoked. She has never used smokeless tobacco.  Alcohol: none    Medication Adherence/Access: no issues reported    Stiff Person Syndrome/Rheumatoid Arthritis:   - IVIG every 4 weeks [facility]; last dose 3/7/24, next dose 4/2/24 (tomorrow)   - Prednisone 5 mg daily- has weaned down on the dose recently and back to her baseline RA dosing. No longer on insulin for steroid-induced hyperglycemia but she would like to leave the orders on her med list in case she needs to restart this.   - Baclofen 10 mg twice daily   - Clonazepam 0.5mg in the morning and 1mg at bedtime + 0.5 mg as needed  (rare use of PRN dose, only 3 times so far)   - Tylenol 1000mg as needed for pain/headache occasionally    Patient states she is still having a lot of dizziness/light-headedness and feeling unstable. This is  "not worse, but not better. She thought the clonazepam might be causing the dizziness but it started before she went on the clonazepam.  She is not driving due to dizziness and double vision.     She received IVIG inpatient and has had her first outpatient dose so far. States she tolerates the infusions well without side effects. She is hoping that the next dose helps with her feeling of instability and back pain. She states she is not having any shaking but does have some coldness in her legs. Overall, states she is \"1000 times better\" than before she odessa in the hospital. The IVIG helped her start walking again.     Patient is scheduled for Botox injections on Friday for tightness in her legs.   She is scheduled for EMG on 4/23 and is wondering if she needs to hold the muscle relaxers before the procedure.   She has applied for disability.     Anxiety:   - Citalopram 20 mg daily    Patient reports her anxiety has been okay lately. She was previously on 40 mg of citalopram but dose was reduced in the hospital and she states this is working fine at the lower dosage.     Today's Vitals: There were no vitals taken for this visit.  ----------------    I spent 16 minutes with this patient today. I offer these suggestions for consideration by Dr. Madison. A copy of the visit note was provided to the patient's provider(s).    A summary of these recommendations was sent via [employer].    Mary Lord, Pharm.D.  Medication Therapy Management Pharmacist  [facility]    Telemedicine Visit Details  Type of service:  Telephone visit  Start Time:  10:02 AM  End Time: 10:18 AM     Medication Therapy Recommendations  No medication therapy recommendations to display   "

## 2024-04-01 NOTE — PATIENT INSTRUCTIONS
"Recommendations from today's MTM visit:                                                      Next IVIG treatment is tomorrow (4/2/24)  Typically muscle relaxers are held for 24 hours prior to EMG but let's see what Dr. Madison says about whether you should stop these medications before your EMG   Follow up with Dr. Madison on 4/23/24     Follow-up: 6 months or sooner if needed     It was great speaking with you today.  I value your experience and would be very thankful for your time in providing feedback in our clinic survey. In the next few days, you may receive an email or text message from [employer] with a link to a survey related to your  clinical pharmacist.\"     To schedule another MTM appointment, please call the clinic directly or you may call the MTM scheduling line at [phone].    My Clinical Pharmacist's contact information:                                                      Please feel free to contact me with any questions or concerns you have.      Mary Lord, Pharm.D.  Medication Therapy Management Pharmacist  [facility]     "

## 2024-04-02 ENCOUNTER — DOCUMENTATION ONLY (OUTPATIENT)
Dept: PHARMACY | Facility: CLINIC | Age: 64
End: 2024-04-02
Payer: COMMERCIAL

## 2024-04-02 NOTE — PROGRESS NOTES
Skilled Nurse visit in the Patient Home to administer Privigen 25g.  No recent elevated temperature, fever, chills, productive cough, coughing for 3 weeks or longer or hemoptysis, abnormal vital signs, night sweats, chest pain. No  decrease in your appetite, unexplained weight loss or fatigue.  No other new onset medical symptoms.  Current weight 115#.  Port-a-Cath accessed, 1 attempt, blood return verified.  Pre medicated with acetaminophen 650mg PO, diphenhydramine 50mg PO, and solu-cortef 100mg IVP.  No labs drawn.  Infusion completed without complication or reaction. Pt believes therapy is helping to manage symptoms related to therapy, but infusions/diagnosis is new to patient.     Thuy Freitas RN, BSN  [facility]  [phone]  [email or web address]

## 2024-04-23 ENCOUNTER — OFFICE VISIT (OUTPATIENT)
Dept: NEUROLOGY | Facility: CLINIC | Age: 64
End: 2024-04-23
Attending: PSYCHIATRY & NEUROLOGY
Payer: COMMERCIAL

## 2024-04-23 ENCOUNTER — MYC MEDICAL ADVICE (OUTPATIENT)
Dept: NEUROLOGY | Facility: CLINIC | Age: 64
End: 2024-04-23

## 2024-04-23 VITALS
BODY MASS INDEX: 21.34 KG/M2 | SYSTOLIC BLOOD PRESSURE: 134 MMHG | DIASTOLIC BLOOD PRESSURE: 81 MMHG | WEIGHT: 118.8 LBS | HEART RATE: 81 BPM | OXYGEN SATURATION: 97 %

## 2024-04-23 DIAGNOSIS — G25.82 STIFF PERSON SYNDROME: ICD-10-CM

## 2024-04-23 DIAGNOSIS — G25.82 STIFF PERSON SYNDROME: Primary | ICD-10-CM

## 2024-04-23 DIAGNOSIS — F41.9 ANXIETY: ICD-10-CM

## 2024-04-23 DIAGNOSIS — F41.9 ANXIETY: Primary | ICD-10-CM

## 2024-04-23 DIAGNOSIS — Z87.39 H/O RHEUMATOID ARTHRITIS: ICD-10-CM

## 2024-04-23 DIAGNOSIS — R41.3 MEMORY LOSS: ICD-10-CM

## 2024-04-23 PROCEDURE — G0463 HOSPITAL OUTPT CLINIC VISIT: HCPCS | Performed by: PSYCHIATRY & NEUROLOGY

## 2024-04-23 PROCEDURE — 99215 OFFICE O/P EST HI 40 MIN: CPT | Mod: 25 | Performed by: PSYCHIATRY & NEUROLOGY

## 2024-04-23 PROCEDURE — 95910 NRV CNDJ TEST 7-8 STUDIES: CPT | Performed by: STUDENT IN AN ORGANIZED HEALTH CARE EDUCATION/TRAINING PROGRAM

## 2024-04-23 PROCEDURE — 95885 MUSC TST DONE W/NERV TST LIM: CPT | Performed by: STUDENT IN AN ORGANIZED HEALTH CARE EDUCATION/TRAINING PROGRAM

## 2024-04-23 RX ORDER — CITALOPRAM HYDROBROMIDE 20 MG/1
30 TABLET ORAL DAILY
Qty: 45 TABLET | Refills: 11 | Status: SHIPPED | OUTPATIENT
Start: 2024-04-23

## 2024-04-23 RX ORDER — CITALOPRAM HYDROBROMIDE 20 MG/1
20 TABLET ORAL DAILY
Qty: 30 TABLET | Refills: 11 | Status: SHIPPED | OUTPATIENT
Start: 2024-04-23 | End: 2024-09-04

## 2024-04-23 RX ORDER — CITALOPRAM HYDROBROMIDE 10 MG/1
10 TABLET ORAL DAILY
Qty: 30 TABLET | Refills: 11 | Status: SHIPPED | OUTPATIENT
Start: 2024-04-23 | End: 2024-09-04

## 2024-04-23 ASSESSMENT — PAIN SCALES - GENERAL: PAINLEVEL: MILD PAIN (3)

## 2024-04-23 NOTE — PATIENT INSTRUCTIONS
Continue ivig every 4 weeks     I defer to rehab medicine as to whether or not botox will be helpful   If it does not help, the effect wears off in 3 months     Continue baclofen     Drink one electrolyte drink per day - this should help with your dizziness     Neuropsychologic evaluation     Follow up after neuropsychologic evaluation

## 2024-04-23 NOTE — NURSING NOTE
Chief Complaint   Patient presents with    MS    RECHECK     Ms follow up      Vitals were taken and medications were reconciled.   Niles Jamil, EMT  11:08 AM

## 2024-04-23 NOTE — PROGRESS NOTES
Date of Service: 4/23/2024    [facility] Neurology   MS Clinic Evaluation    Subjective: 62 y/o woman with melanoma of the abdomen with metastasis to lymph nodes s/p adjuvant chemotherapy and subsequently nivolumab x 1 year who presents in follow up with neurology after admission for progressive neurologic symptoms.    Her last visit with me she has been able to taper off of prednisone down to her baseline dose of 5 mg daily.  She has been receiving IVIG every 4 weeks.    It seems that she is doing better.  Her gait is still off.  It is slow.  However she is feeling more balanced.  Physical therapy does notice improvement.  She feels like she has reached a plateau.    She will continue to see double occasionally.    She often has brain fog.  Her partner notes that she will spend a lot of time worrying about things.    Minimal activity will drain her energy.  She struggles with memory.  She has difficulty recalling recent events and conversations.  It takes her longer to respond to questions.    She does continue to take clonazepam.  Half milligram in the morning and 1 mg in the evening.    She is taking baclofen 10 mg twice per day.    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  This is scheduled for later this week.    She had her EMG done earlier today.    No Known Allergies    Current Outpatient Medications   Medication Sig Dispense Refill    acetaminophen (TYLENOL) 500 MG tablet Take 500-1,000 mg by mouth every 6 hours as needed for mild pain      baclofen (LIORESAL) 10 MG tablet Take 0.5 tablets (5 mg) by mouth 2 times daily for 7 days, THEN 1 tablet (10 mg) 2 times daily for 180 days. 60 tablet 5    blood glucose (NO BRAND SPECIFIED) lancets standard Use to test blood sugar 4 times daily or as directed. Henok Contour Microlet lancets 120 Lancet 0    blood glucose (NO BRAND SPECIFIED) test strip To use to test glucose level in the blood Use to test blood sugar  4 times  daily as directed. To accompany glucose monitor brands per insurance coverage. 100 strip 0    calcium carbonate 500 mg, elemental, (OSCAL) 500 MG tablet Take 1 tablet (500 mg) by mouth 2 times daily (with meals) 120 tablet 0    citalopram (CELEXA) 20 MG tablet Take 1 tablet (20 mg) by mouth daily 30 tablet 11    clonazePAM (KLONOPIN) 0.5 MG tablet Take 1 tablet (0.5 mg) by mouth daily 60 tablet 5    clonazePAM (KLONOPIN) 0.5 MG tablet Take 1 tablet (0.5 mg) by mouth daily as needed for anxiety (Tremors) 30 tablet 5    clonazePAM (KLONOPIN) 1 MG tablet Take 1 tablet (1 mg) by mouth at bedtime 60 tablet 5    folic acid (FOLVITE) 1 MG tablet Take 1 tablet (1,000 mcg) by mouth daily 30 tablet 0    immune globulin - sucrose free 10 % injection Inject 25.7 g into the vein every 28 (twenty-eight) days      levothyroxine (SYNTHROID/LEVOTHROID) 50 MCG tablet Take 1 tablet (50 mcg) by mouth daily 30 tablet 0    pantoprazole (PROTONIX) 40 MG EC tablet Take 1 tablet (40 mg) by mouth every morning (before breakfast) 30 tablet 0    polyethylene glycol (MIRALAX) 17 GM/Dose powder Take 17 g by mouth daily 510 g 0    potassium chloride ER (MICRO-K) 10 MEQ CR capsule Take 1 capsule (10 mEq) by mouth 2 times daily 60 capsule 0    predniSONE (DELTASONE) 5 MG tablet Take 1 tablet (5 mg) by mouth every morning 30 tablet 3    Vitamin D3 (CHOLECALCIFEROL) 25 mcg (1000 units) tablet Take 2 tablets (50 mcg) by mouth daily 60 tablet 0    insulin aspart (NOVOLOG PEN) 100 UNIT/ML pen Inject 1-3 Units Subcutaneous 3 times daily (before meals) 15 mL 0    insulin aspart (NOVOLOG PEN) 100 UNIT/ML pen Inject 1-3 Units Subcutaneous at bedtime 15 mL 0    insulin pen needle (32G X 4 MM) 32G X 4 MM miscellaneous Use as directed by provider Per insurance coverage 100 each 0     Current Facility-Administered Medications   Medication Dose Route Frequency Provider Last Rate Last Admin    botulinum toxin type A (BOTOX) 100 units injection 300-400 Units   300-400 Units Intramuscular Q90 Days Escobar Felix MD            Past medical, surgical, social and family history was personally reviewed. Pertinent details noted above.     Physical Examination:   /81 (BP Location: Left arm, Patient Position: Sitting, Cuff Size: Adult Small)   Pulse 81   Wt 53.9 kg (118 lb 12.8 oz)   SpO2 97%   BMI 21.34 kg/m      General: no acute distress  Cranial nerves:   VFFC  PERRL w/no RAPD  EOM full w/no ALBERTO, smooth pursuit is saccadic  Face symmetric  Hearing intact  No dysarthria   Motor:   Tone is normal   Bulk is normal   Minimal tremor     R L  Deltoid  5 5  Biceps  5 5  Triceps 5 5  Wrist ext 5 5  Finger ext 5 5  Finger abd 5 5    Hip flexion 5- 5-  Knee flexion 5 5  Knee ext 5 5  Ankle d/f 5 5    Reflexes: 3+ and symmetric throughout, 5-6 beats ankle clonus   Sensory:   Romberg is present  Coordination: no ataxia or dysmetria   Gait: stiff slow cautious gait, tandem moderately impaired    Tests/Imaging:   CSF gad65 and dppx neg  Flow w b cells  Cytology negative for malignant cells, +lymphocytosis  Protein 85  32 wbc, lymphocytic  Study 12/14/23 - 2 ocb,    Nirav neg from serum   Blessing serum test +gad65 0.07    Elevated cadmium 1.6 (ULN 0.6)      MRI Brain  1/2024-personal review, right cerebellar dva, otherwise unremarkable study aside from some atrophy and mild ischemic changes     MRI Cervical spine   1/2024 - notable degenerative changes but no evidence of myelomalacia    MRI Thoracic spine   N/d    Assessment: 63-year-old woman with a history of rheumatoid arthritis, metastatic melanoma status post nivolumab who now presents with progressive neurologic symptoms.  CSF is positive for inflammatory changes.  NIRAV 65 antibody is minimally elevated.    EMG was negative for paraspinal hyperactivity such as what is expected with stiff person syndrome. 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      I have recommended that she continue with monthly ivig.  However, given the absent emg findings, we might be able to wean the ivig in the future.      We discussed how on-going anxiety can negatively affect memory and energy levels.  She was agreeable to increasing the dose of citalopram (previously on 40 mg daily, but dose was reduced when she was started on clonazepam).  Due to the risk of memory impairment related to her condition, I have recommended she undergo a neuropsychologic evaluation.     Plan:   - continue monthly ivig   - increase citalopram to 30 mg daily   - continue clonazepam and baclofen   - neuropsychologic evaluation   - ok to trial botox   - encouraged one electrolyte drink daily for dizziness   - follow up after memory evaluation     Note was completed with the assistance of Dragon Fluency software which can often result in accidental word substitutions.     A total of 40 minutes on the date of service were spent in the care of this patient.   Kandy Madison MD on 4/23/2024 at 12:18 PM

## 2024-04-23 NOTE — PROGRESS NOTES
[facility]  Electrodiagnostic Laboratory                 Department of Neurology                                                                                                         Test Date:  2024    Patient: Shayy Cortez  : 1960 Physician: Marilin Ha MD   Sex: Female AGE: 63 year Ref Phys:    ID#: [id number]   Technician: Leon Pickett     History and Examination:  63-year-old female with malignant melanoma s/p adjuvant chemotherapy and subsequently nivolumab x 1 year presenting with subacute progressive lightheadedness, vertiginous sensation, gait difficulties, and generalized shaking.  This was associated with nausea and significant weight loss.  She was treated with IV steroids and IVIG with significant improvement in all symptoms. Nivolumab was discontinued.  She is now able to walk again without any tremor. Work-up revealed low titer ESTEPHANIA 65 antibody. This study was requested to assess for Stiff-person syndrome.     Techniques:  Motor and sensory conduction studies were done with surface recording electrodes. EMG was done with a concentric needle electrode as well as surface electrodes (for 2-channel EMG).    Results:  Nerve conduction studies and needle EMG of the right upper and lower limbs were normal.  There were no fibrillation potentials or myotonic discharges.    2-channel surface electrode EMG recorded from right biceps-triceps and right rectus femoris-semimembranosus did not show continuous motor unit activity in agonist and antagonist muscles.    Interpretation:  This is a normal study.  There is no electrophysiologic evidence to suggest stiff person syndrome in the current study.  There is also no electrophysiologic evidence of a diffuse myopathy or large-fiber peripheral neuropathy in the current study.    Marilin Ha MD  Department of Neurology        Nerve Conduction Studies  Motor Sites      Latency Amplitude Neg. Amp Diff  Segment Distance Velocity Neg. Dur Neg Area Diff Temperature Comment   Site (ms) Norm (mV) Norm (%)  cm m/s Norm (ms) (%) ( C)    Right Fibular (EDB) Motor   Ankle 3.8  < 6.0 3.0  > 2.0  Ankle-EDB 8   4.1  31    Bel Fibular Head 9.8 - 2.4 - -20 Bel Fibular Head-Ankle 27 45  > 38 4.0 -23 31.1    Pop Fossa 11.7 - 2.4 - 0 Pop Fossa-Bel Fibular Head 9 47  > 38 4.1 4 31.2    Right Median (APB) Motor   Wrist 3.3  < 4.4 8.0  > 5.0  Wrist-APB 8   4.2  31.9    Elbow 6.8 - 7.7  > 5.0 -4 Elbow-Wrist 19 54  > 48 4.3 -4 32    Right Tibial (AHB) Motor   Ankle 4.1  < 6.5 18.2  > 5.0  Ankle-AH 8   3.4  31    Knee 11.5 - 7.7 - -58 Knee-Ankle 35 47  > 38 4.4 -43 31    Right Ulnar (ADM) Motor   Wrist 2.5  < 3.5 9.2  > 5.0  Wrist-ADM 8   5.4  32.5    Below Elbow 5.5 - 9.0 - -2 Below Elbow-Wrist 18 60  > 48 5.5 -2 32.6    Above Elbow 6.9 - 8.3 - -8 Above Elbow-Below Elbow 8.5 61  > 48 5.6 -9 32.6      F-Wave Sites      Min F-Lat Max-Min F-Lat Mean F-Lat   Site (ms) (ms) (ms)   Right Tibial F-Wave   Ankle 44.7 5.5 -   Right Ulnar F-Wave   Wrist 23.7 1.30 24.1     Sensory Sites      Onset Lat Peak Lat Amp (O-P) Amp (P-P) Segment Distance Velocity Temperature Comment   Site ms (ms)  V Norm ( V)  cm m/s Norm ( C)    Right Median Sensory   Wrist-Dig II 2.5 3.3 31  > 10 40 Wrist-Dig II 15 60  > 48 32    Right Superficial Fibular Sensory   14 cm-Ankle 2.7 3.3 19  > 3 26 14 cm-Ankle 12.5 46  > 38 31    Right Sural Sensory   Calf-Lat Malleolus 2.8 3.5 13  > 5 14 Calf-Lat Malleolus 14 50  > 38 31    Right Ulnar Sensory   Wrist-Dig V 2.1 2.7 45  > 8 56 Wrist-Dig V 12.5 60  > 48 32        Electromyography     Side Muscle Ins Act Fibs/PSW Fasc HF Amp Dur Poly Recrt Int Pat   Right Tib ant Nml None Nml 0 Nml Nml 0 Nml Nml   Right Vastus med Nml None Nml 0 Nml Nml 0 Nml Nml   Right Gastroc MH Nml None Nml 0 Nml Nml 0 Nml Nml   Right FDI Nml None Nml 0 Nml Nml 0 Nml Nml   Right Triceps Nml None Nml 0 Nml Nml 0 Nml Nml   Right Deltoid Nml None Nml 0 Nml  Nml 0 Nml Nml       NCS Waveforms:    Motor                Sensory                F-Wave

## 2024-04-23 NOTE — TELEPHONE ENCOUNTER
Health Call Center    Phone Message    May a detailed message be left on voicemail: yes     Reason for Call: Medication Question or concern regarding medication   Prescription Clarification  Name of Medication: citalopram (CELEXA) 20 MG tablet  Prescribing Provider: Dr. Madison   Pharmacy:   [facility] [address] [address] AT [facility]     What on the order needs clarification? Pt's pharmacy states that the Pt;s insurance will not cover the 1 1/2 tablet that the provider sent over. Pharmacy is requesting a new script be sent with a 10mg plus a 20mg tablet.    Please call pharmacy to discuss and advise at [phone]     Action Taken: Message routed to:  [facility]     Travel Screening: Not Applicable

## 2024-04-23 NOTE — LETTER
2024       RE: Shayy Cortez  [address]  [place] [facility] [place] [facility] [address]         Dear Colleague,    Thank you for referring your patient, Shayy Cortez, to the [facility] EMG [facility] [place] at [facility]. Please see a copy of my visit note below.                        [facility]  Electrodiagnostic Laboratory                 Department of Neurology                                                                                                         Test Date:  2024    Patient: Shayy Cortez  : 1960 Physician: Marilin Ha MD   Sex: Female AGE: 63 year Ref Phys:    ID#: [id number]   Technician: Leon Pickett     History and Examination:  63-year-old female with malignant melanoma s/p adjuvant chemotherapy and subsequently nivolumab x 1 year presenting with subacute progressive lightheadedness, vertiginous sensation, gait difficulties, and generalized shaking.  This was associated with nausea and significant weight loss.  She was treated with IV steroids and IVIG with significant improvement in all symptoms. Nivolumab was discontinued.  She is now able to walk again without any tremor. Work-up revealed low titer ESTEPHANIA 65 antibody. This study was requested to assess for Stiff-person syndrome.     Techniques:  Motor and sensory conduction studies were done with surface recording electrodes. EMG was done with a concentric needle electrode as well as surface electrodes (for 2-channel EMG).    Results:  Nerve conduction studies and needle EMG of the right upper and lower limbs were normal.  There were no fibrillation potentials or myotonic discharges.    2-channel surface electrode EMG recorded from right biceps-triceps and right rectus femoris-semimembranosus did not show continuous motor unit activity in agonist and antagonist muscles.    Interpretation:  This is a normal study.  There is no electrophysiologic evidence to suggest  stiff person syndrome in the current study.  There is also no electrophysiologic evidence of a diffuse myopathy or large-fiber peripheral neuropathy in the current study.    Marilin Ha MD  Department of Neurology        Nerve Conduction Studies  Motor Sites      Latency Amplitude Neg. Amp Diff Segment Distance Velocity Neg. Dur Neg Area Diff Temperature Comment   Site (ms) Norm (mV) Norm (%)  cm m/s Norm (ms) (%) ( C)    Right Fibular (EDB) Motor   Ankle 3.8  < 6.0 3.0  > 2.0  Ankle-EDB 8   4.1  31    Bel Fibular Head 9.8 - 2.4 - -20 Bel Fibular Head-Ankle 27 45  > 38 4.0 -23 31.1    Pop Fossa 11.7 - 2.4 - 0 Pop Fossa-Bel Fibular Head 9 47  > 38 4.1 4 31.2    Right Median (APB) Motor   Wrist 3.3  < 4.4 8.0  > 5.0  Wrist-APB 8   4.2  31.9    Elbow 6.8 - 7.7  > 5.0 -4 Elbow-Wrist 19 54  > 48 4.3 -4 32    Right Tibial (AHB) Motor   Ankle 4.1  < 6.5 18.2  > 5.0  Ankle-AH 8   3.4  31    Knee 11.5 - 7.7 - -58 Knee-Ankle 35 47  > 38 4.4 -43 31    Right Ulnar (ADM) Motor   Wrist 2.5  < 3.5 9.2  > 5.0  Wrist-ADM 8   5.4  32.5    Below Elbow 5.5 - 9.0 - -2 Below Elbow-Wrist 18 60  > 48 5.5 -2 32.6    Above Elbow 6.9 - 8.3 - -8 Above Elbow-Below Elbow 8.5 61  > 48 5.6 -9 32.6      F-Wave Sites      Min F-Lat Max-Min F-Lat Mean F-Lat   Site (ms) (ms) (ms)   Right Tibial F-Wave   Ankle 44.7 5.5 -   Right Ulnar F-Wave   Wrist 23.7 1.30 24.1     Sensory Sites      Onset Lat Peak Lat Amp (O-P) Amp (P-P) Segment Distance Velocity Temperature Comment   Site ms (ms)  V Norm ( V)  cm m/s Norm ( C)    Right Median Sensory   Wrist-Dig II 2.5 3.3 31  > 10 40 Wrist-Dig II 15 60  > 48 32    Right Superficial Fibular Sensory   14 cm-Ankle 2.7 3.3 19  > 3 26 14 cm-Ankle 12.5 46  > 38 31    Right Sural Sensory   Calf-Lat Malleolus 2.8 3.5 13  > 5 14 Calf-Lat Malleolus 14 50  > 38 31    Right Ulnar Sensory   Wrist-Dig V 2.1 2.7 45  > 8 56 Wrist-Dig V 12.5 60  > 48 32        Electromyography     Side Muscle Ins Act Fibs/PSW Fasc HF Amp  Dur Poly Recrt Int Pat   Right Tib ant Nml None Nml 0 Nml Nml 0 Nml Nml   Right Vastus med Nml None Nml 0 Nml Nml 0 Nml Nml   Right Gastroc MH Nml None Nml 0 Nml Nml 0 Nml Nml   Right FDI Nml None Nml 0 Nml Nml 0 Nml Nml   Right Triceps Nml None Nml 0 Nml Nml 0 Nml Nml   Right Deltoid Nml None Nml 0 Nml Nml 0 Nml Nml       NCS Waveforms:    Motor                Sensory                F-Wave                 Again, thank you for allowing me to participate in the care of your patient.      Sincerely,    Marilin Ha MD

## 2024-04-23 NOTE — TELEPHONE ENCOUNTER
Received a message from pharmacy that Shayy's insurance will not cover the current quantity of the 20 mg Celexa prescribed by Dr. Madison.  Pharmacy requesting new scripts - a 10 mg and a 20 mg script that can be combined to get the correct dose.  Scripts pended to Dr. Madison.    Erika Lebron RN

## 2024-04-23 NOTE — LETTER
4/23/2024       RE: Shayy Cortez  [address]  [facility] [address]       Dear Colleague,    Thank you for referring your patient, Shayy Cortez, to the [facility] MULTIPLE SCLEROSIS CLINIC [place] at [facility]. Please see a copy of my visit note below.    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    Her last visit with me she has been able to taper off of prednisone down to her baseline dose of 5 mg daily.  She has been receiving IVIG every 4 weeks.    It seems that she is doing better.  Her gait is still off.  It is slow.  However she is feeling more balanced.  Physical therapy does notice improvement.  She feels like she has reached a plateau.    She will continue to see double occasionally.    She often has brain fog.  Her partner notes that she will spend a lot of time worrying about things.    Minimal activity will drain her energy.  She struggles with memory.  She has difficulty recalling recent events and conversations.  It takes her longer to respond to questions.    She does continue to take clonazepam.  Half milligram in the morning and 1 mg in the evening.    She is taking baclofen 10 mg twice per day.    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  This is scheduled for later this week.    She had her EMG done earlier today.    No Known Allergies    Current Outpatient Medications   Medication Sig Dispense Refill    acetaminophen (TYLENOL) 500 MG tablet Take 500-1,000 mg by mouth every 6 hours as needed for mild pain      baclofen (LIORESAL) 10 MG tablet Take 0.5 tablets (5 mg) by mouth 2 times daily for 7 days, THEN 1 tablet (10 mg)  2 times daily for 180 days. 60 tablet 5    blood glucose (NO BRAND SPECIFIED) lancets standard Use to test blood sugar 4 times daily or as directed. Henok Contour Microlet lancets 120 Lancet 0    blood glucose (NO BRAND SPECIFIED) test strip To use to test glucose level in the blood Use to test blood sugar  4 times daily as directed. To accompany glucose monitor brands per insurance coverage. 100 strip 0    calcium carbonate 500 mg, elemental, (OSCAL) 500 MG tablet Take 1 tablet (500 mg) by mouth 2 times daily (with meals) 120 tablet 0    citalopram (CELEXA) 20 MG tablet Take 1 tablet (20 mg) by mouth daily 30 tablet 11    clonazePAM (KLONOPIN) 0.5 MG tablet Take 1 tablet (0.5 mg) by mouth daily 60 tablet 5    clonazePAM (KLONOPIN) 0.5 MG tablet Take 1 tablet (0.5 mg) by mouth daily as needed for anxiety (Tremors) 30 tablet 5    clonazePAM (KLONOPIN) 1 MG tablet Take 1 tablet (1 mg) by mouth at bedtime 60 tablet 5    folic acid (FOLVITE) 1 MG tablet Take 1 tablet (1,000 mcg) by mouth daily 30 tablet 0    immune globulin - sucrose free 10 % injection Inject 25.7 g into the vein every 28 (twenty-eight) days      levothyroxine (SYNTHROID/LEVOTHROID) 50 MCG tablet Take 1 tablet (50 mcg) by mouth daily 30 tablet 0    pantoprazole (PROTONIX) 40 MG EC tablet Take 1 tablet (40 mg) by mouth every morning (before breakfast) 30 tablet 0    polyethylene glycol (MIRALAX) 17 GM/Dose powder Take 17 g by mouth daily 510 g 0    potassium chloride ER (MICRO-K) 10 MEQ CR capsule Take 1 capsule (10 mEq) by mouth 2 times daily 60 capsule 0    predniSONE (DELTASONE) 5 MG tablet Take 1 tablet (5 mg) by mouth every morning 30 tablet 3    Vitamin D3 (CHOLECALCIFEROL) 25 mcg (1000 units) tablet Take 2 tablets (50 mcg) by mouth daily 60 tablet 0    insulin aspart (NOVOLOG PEN) 100 UNIT/ML pen Inject 1-3 Units Subcutaneous 3 times daily (before meals) 15 mL 0    insulin aspart (NOVOLOG PEN) 100 UNIT/ML pen Inject 1-3 Units Subcutaneous at  bedtime 15 mL 0    insulin pen needle (32G X 4 MM) 32G X 4 MM miscellaneous Use as directed by provider Per insurance coverage 100 each 0     Current Facility-Administered Medications   Medication Dose Route Frequency Provider Last Rate Last Admin    botulinum toxin type A (BOTOX) 100 units injection 300-400 Units  300-400 Units Intramuscular Q90 Days Escobar Felix MD            Past medical, surgical, social and family history was personally reviewed. Pertinent details noted above.     Physical Examination:   /81 (BP Location: Left arm, Patient Position: Sitting, Cuff Size: Adult Small)   Pulse 81   Wt 53.9 kg (118 lb 12.8 oz)   SpO2 97%   BMI 21.34 kg/m      General: no acute distress  Cranial nerves:   VFFC  PERRL w/no RAPD  EOM full w/no ALBERTO, smooth pursuit is saccadic  Face symmetric  Hearing intact  No dysarthria   Motor:   Tone is normal   Bulk is normal   Minimal tremor     R L  Deltoid  5 5  Biceps  5 5  Triceps 5 5  Wrist ext 5 5  Finger ext 5 5  Finger abd 5 5    Hip flexion 5- 5-  Knee flexion 5 5  Knee ext 5 5  Ankle d/f 5 5    Reflexes: 3+ and symmetric throughout, 5-6 beats ankle clonus   Sensory:   Romberg is present  Coordination: no ataxia or dysmetria   Gait: stiff slow cautious gait, tandem moderately impaired    Tests/Imaging:   CSF gad65 and dppx neg  Flow w b cells  Cytology negative for malignant cells, +lymphocytosis  Protein 85  32 wbc, lymphocytic  Study 12/14/23 - 2 ocb,    Nirav neg from serum   [place] serum test +gad65 0.07    Elevated cadmium 1.6 (ULN 0.6)      MRI Brain  1/2024-personal review, right cerebellar dva, otherwise unremarkable study aside from some atrophy and mild ischemic changes     MRI Cervical spine   1/2024 - notable degenerative changes but no evidence of myelomalacia    MRI Thoracic spine   N/d    Assessment: 63-year-old woman with a history of rheumatoid arthritis, metastatic melanoma status post nivolumab who now presents with progressive  neurologic symptoms.  CSF is positive for inflammatory changes.  ESTEPHANIA 65 antibody is minimally elevated.    EMG was negative for paraspinal hyperactivity such as what is expected with stiff person syndrome. 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     I have recommended that she continue with monthly ivig.  However, given the absent emg findings, we might be able to wean the ivig in the future.      We discussed how on-going anxiety can negatively affect memory and energy levels.  She was agreeable to increasing the dose of citalopram (previously on 40 mg daily, but dose was reduced when she was started on clonazepam).  Due to the risk of memory impairment related to her condition, I have recommended she undergo a neuropsychologic evaluation.     Plan:   - continue monthly ivig   - increase citalopram to 30 mg daily   - continue clonazepam and baclofen   - neuropsychologic evaluation   - ok to trial botox   - encouraged one electrolyte drink daily for dizziness   - follow up after memory evaluation     Note was completed with the assistance of Dragon Fluency software which can often result in accidental word substitutions.     A total of 40 minutes on the date of service were spent in the care of this patient.   Kandy Madison MD on 4/23/2024 at 12:18 PM          Again, thank you for allowing me to participate in the care of your patient.      Sincerely,    Kandy Madison MD

## 2024-04-26 ENCOUNTER — OFFICE VISIT (OUTPATIENT)
Dept: PHYSICAL MEDICINE AND REHAB | Facility: CLINIC | Age: 64
End: 2024-04-26
Payer: COMMERCIAL

## 2024-04-26 VITALS — DIASTOLIC BLOOD PRESSURE: 77 MMHG | SYSTOLIC BLOOD PRESSURE: 130 MMHG | HEART RATE: 81 BPM | OXYGEN SATURATION: 98 %

## 2024-04-26 DIAGNOSIS — G25.82 STIFF-MAN SYNDROME: Primary | ICD-10-CM

## 2024-04-26 DIAGNOSIS — M62.838 SPASM OF MUSCLE: ICD-10-CM

## 2024-04-26 DIAGNOSIS — R26.9 ABNORMAL GAIT: ICD-10-CM

## 2024-04-26 PROCEDURE — 99213 OFFICE O/P EST LOW 20 MIN: CPT | Mod: 25 | Performed by: PHYSICAL MEDICINE & REHABILITATION

## 2024-04-26 PROCEDURE — 64643 CHEMODENERV 1 EXTREM 1-4 EA: CPT | Performed by: PHYSICAL MEDICINE & REHABILITATION

## 2024-04-26 PROCEDURE — 64642 CHEMODENERV 1 EXTREMITY 1-4: CPT | Performed by: PHYSICAL MEDICINE & REHABILITATION

## 2024-04-26 PROCEDURE — 95873 GUIDE NERV DESTR ELEC STIM: CPT | Performed by: PHYSICAL MEDICINE & REHABILITATION

## 2024-04-26 NOTE — LETTER
4/26/2024       RE: Shayy Cortez  [address]  [facility] [address]       Dear Colleague,    Thank you for referring your patient, Shayy Cortez, to the [facility] at [facility]. Please see a copy of my visit note below.    CC: Gait disturbance, muscle pain and dizziness in the setting of stiff person syndrome and rheumatoid arthritis.     63 year old female with PMH of cutaneous melanoma of the abdomen s/p one year treatment of nivolumab with development of progressive tremor, transient vertigo, abnormal eye movements and gait instability despite cessation of Nivolumab that started in August 2023. 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 Pt completed 5 day course of solumedrol and IVIG w/ improvement in tone, hyperreflexia, and tremor noted.  [facility] to 1/26/2024.  She was discharged home.  In addition to managing her neurologic status, she also has required medical mgmt of her urinary retention which is improving, constipation improving, blood sugars management due to high dose steroids and long term taper, pain, and mental health.     Her significant other who accompanies her notes that he has noticed continued problems with memory, problem-solving and thinking in general.  Even though she is better compared to when she was at the [facility], her persistent issues frustrate her.  She is able to walk without walker but feels unsteady and shuffling.  She has seen her ophthalmologist and currently is without additional interventions.  She has followed up with her neurologist and received the clearance to undergo Botox injections.      Current Outpatient Prescriptions     Current Outpatient Medications   Medication Sig Dispense Refill    acetaminophen (TYLENOL) 500 MG tablet Take 500-1,000 mg by mouth every 6 hours as needed for mild pain      baclofen (LIORESAL) 10 MG tablet Take 0.5  tablets (5 mg) by mouth 2 times daily for 7 days, THEN 1 tablet (10 mg) 2 times daily for 180 days. 60 tablet 5    blood glucose (NO BRAND SPECIFIED) lancets standard Use to test blood sugar 4 times daily or as directed. Henok Contour Microlet lancets 120 Lancet 0    blood glucose (NO BRAND SPECIFIED) test strip To use to test glucose level in the blood Use to test blood sugar  4 times daily as directed. To accompany glucose monitor brands per insurance coverage. 100 strip 0    calcium carbonate 500 mg, elemental, (OSCAL) 500 MG tablet Take 1 tablet (500 mg) by mouth 2 times daily (with meals) 120 tablet 0    citalopram (CELEXA) 20 MG tablet Take 1.5 tablets (30 mg) by mouth daily 45 tablet 11    clonazePAM (KLONOPIN) 0.5 MG tablet Take 1 tablet (0.5 mg) by mouth daily 60 tablet 5    clonazePAM (KLONOPIN) 0.5 MG tablet Take 1 tablet (0.5 mg) by mouth daily as needed for anxiety (Tremors) 30 tablet 5    clonazePAM (KLONOPIN) 1 MG tablet Take 1 tablet (1 mg) by mouth at bedtime 60 tablet 5    folic acid (FOLVITE) 1 MG tablet Take 1 tablet (1,000 mcg) by mouth daily 30 tablet 0    immune globulin - sucrose free 10 % injection Inject 25.7 g into the vein every 28 (twenty-eight) days      levothyroxine (SYNTHROID/LEVOTHROID) 50 MCG tablet Take 1 tablet (50 mcg) by mouth daily 30 tablet 0    pantoprazole (PROTONIX) 40 MG EC tablet Take 1 tablet (40 mg) by mouth every morning (before breakfast) 30 tablet 0    polyethylene glycol (MIRALAX) 17 GM/Dose powder Take 17 g by mouth daily 510 g 0    potassium chloride ER (MICRO-K) 10 MEQ CR capsule Take 1 capsule (10 mEq) by mouth 2 times daily 60 capsule 0    predniSONE (DELTASONE) 5 MG tablet Take 1 tablet (5 mg) by mouth every morning 30 tablet 3    Vitamin D3 (CHOLECALCIFEROL) 25 mcg (1000 units) tablet Take 2 tablets (50 mcg) by mouth daily 60 tablet 0    citalopram (CELEXA) 10 MG tablet Take 1 tablet (10 mg) by mouth daily (Patient not taking: Reported on 4/26/2024) 30  tablet 11    citalopram (CELEXA) 20 MG tablet Take 1 tablet (20 mg) by mouth daily (Patient not taking: Reported on 4/26/2024) 30 tablet 11           On examination, patient is alert and cooperative.  Her vitals are stable.  She is afebrile.     Speech is fluent.  Affect is pleasant.     HEENT is negative.  Extraocular movements are intact.  Face is symmetric.  Tongue is midline.  Neck is supple.     She is able to move her upper extremities functionally.  She is able to carry out straight leg raising test.  She is able to do Satish's.     Neurologically she has no sensory deficits.  She has good strength.  She has increased tone with clonus at the ankles.  Tone is increased in the hamstrings bilaterally and the gastrocnemius bilaterally.  Antony's positive.  Plantars are positive.     No focal areas of tenderness were elicited at either the cervical/lumbar/sacroiliac region.  Romberg is negative.     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  Her current hypertonia and stiffness is interfering with her gait and it may be reasonable to start treating her with neurotoxin focally at the hamstrings and the gastrocnemius.  I would proceed with 300 units of Botox to start with.  She may need 400 units eventually.  Anticipate onset of action within 1 to 3 days, peak in 2 weeks and a duration of 12 weeks.  If she gets good relief from this, this could be continued on a regular basis.     This was reviewed at length with the patient and her significant other.  All questions were answered to their satisfaction.  20 minutes spent for this visit, exclusive of the procedure, greater than 50% was for counseling above-mentioned issues.       PROCEDURE NOTE: With her informed consent, after explaining the benefits and  risks of the procedure, using preservative-free normal saline for dilution, 1 cc per 100 units, 300 units of Botox were injected with EMG guidance as follows 75 units were injected into the hamstrings bilaterally.  75 units were injected into the medial and lateral gastrocnemius bilaterally.  She tolerated the procedure well.    I like to see her in follow-up in about 4 to 6 weeks time to see how she is responding to these interventions and plan further treatment.    Thank you much for allow me to assist in her care.        Again, thank you for allowing me to participate in the care of your patient.      Sincerely,    Escobar Felix MD

## 2024-04-26 NOTE — PROGRESS NOTES
CC: Gait disturbance, muscle pain and dizziness in the setting of stiff person syndrome and rheumatoid arthritis.     63 year old female with PMH of cutaneous melanoma of the abdomen s/p one year treatment of nivolumab with development of progressive tremor, transient vertigo, abnormal eye movements and gait instability despite cessation of Nivolumab that started in August 2023. 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 Pt completed 5 day course of solumedrol and IVIG w/ improvement in tone, hyperreflexia, and tremor noted.  [facility]24 to 1/26/2024.  She was discharged home.  In addition to managing her neurologic status, she also has required medical mgmt of her urinary retention which is improving, constipation improving, blood sugars management due to high dose steroids and long term taper, pain, and mental health.     Her significant other who accompanies her notes that he has noticed continued problems with memory, problem-solving and thinking in general.  Even though she is better compared to when she was at the ARU, her persistent issues frustrate her.  She is able to walk without walker but feels unsteady and shuffling.  She has seen her ophthalmologist and currently is without additional interventions.  She has followed up with her neurologist and received the clearance to undergo Botox injections.      Current Outpatient Prescriptions     Current Outpatient Medications   Medication Sig Dispense Refill    acetaminophen (TYLENOL) 500 MG tablet Take 500-1,000 mg by mouth every 6 hours as needed for mild pain      baclofen (LIORESAL) 10 MG tablet Take 0.5 tablets (5 mg) by mouth 2 times daily for 7 days, THEN 1 tablet (10 mg) 2 times daily for 180 days. 60 tablet 5    blood glucose (NO BRAND SPECIFIED) lancets standard Use to test blood sugar 4 times daily or as directed. Henok Contour Microlet lancets 120 Lancet 0    blood glucose (NO BRAND  SPECIFIED) test strip To use to test glucose level in the blood Use to test blood sugar  4 times daily as directed. To accompany glucose monitor brands per insurance coverage. 100 strip 0    calcium carbonate 500 mg, elemental, (OSCAL) 500 MG tablet Take 1 tablet (500 mg) by mouth 2 times daily (with meals) 120 tablet 0    citalopram (CELEXA) 20 MG tablet Take 1.5 tablets (30 mg) by mouth daily 45 tablet 11    clonazePAM (KLONOPIN) 0.5 MG tablet Take 1 tablet (0.5 mg) by mouth daily 60 tablet 5    clonazePAM (KLONOPIN) 0.5 MG tablet Take 1 tablet (0.5 mg) by mouth daily as needed for anxiety (Tremors) 30 tablet 5    clonazePAM (KLONOPIN) 1 MG tablet Take 1 tablet (1 mg) by mouth at bedtime 60 tablet 5    folic acid (FOLVITE) 1 MG tablet Take 1 tablet (1,000 mcg) by mouth daily 30 tablet 0    immune globulin - sucrose free 10 % injection Inject 25.7 g into the vein every 28 (twenty-eight) days      levothyroxine (SYNTHROID/LEVOTHROID) 50 MCG tablet Take 1 tablet (50 mcg) by mouth daily 30 tablet 0    pantoprazole (PROTONIX) 40 MG EC tablet Take 1 tablet (40 mg) by mouth every morning (before breakfast) 30 tablet 0    polyethylene glycol (MIRALAX) 17 GM/Dose powder Take 17 g by mouth daily 510 g 0    potassium chloride ER (MICRO-K) 10 MEQ CR capsule Take 1 capsule (10 mEq) by mouth 2 times daily 60 capsule 0    predniSONE (DELTASONE) 5 MG tablet Take 1 tablet (5 mg) by mouth every morning 30 tablet 3    Vitamin D3 (CHOLECALCIFEROL) 25 mcg (1000 units) tablet Take 2 tablets (50 mcg) by mouth daily 60 tablet 0    citalopram (CELEXA) 10 MG tablet Take 1 tablet (10 mg) by mouth daily (Patient not taking: Reported on 4/26/2024) 30 tablet 11    citalopram (CELEXA) 20 MG tablet Take 1 tablet (20 mg) by mouth daily (Patient not taking: Reported on 4/26/2024) 30 tablet 11           On examination, patient is alert and cooperative.  Her vitals are stable.  She is afebrile.     Speech is fluent.  Affect is pleasant.     HEENT is  negative.  Extraocular movements are intact.  Face is symmetric.  Tongue is midline.  Neck is supple.     She is able to move her upper extremities functionally.  She is able to carry out straight leg raising test.  She is able to do Satish's.     Neurologically she has no sensory deficits.  She has good strength.  She has increased tone with clonus at the ankles.  Tone is increased in the hamstrings bilaterally and the gastrocnemius bilaterally.  Antony's positive.  Plantars are positive.     No focal areas of tenderness were elicited at either the cervical/lumbar/sacroiliac region.  Romberg is negative.     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  Her current hypertonia and stiffness is interfering with her gait and it may be reasonable to start treating her with neurotoxin focally at the hamstrings and the gastrocnemius.  I would proceed with 300 units of Botox to start with.  She may need 400 units eventually.  Anticipate onset of action within 1 to 3 days, peak in 2 weeks and a duration of 12 weeks.  If she gets good relief from this, this could be continued on a regular basis.     This was reviewed at length with the patient and her significant other.  All questions were answered to their satisfaction.  20 minutes spent for this visit, exclusive of the procedure, greater than 50% was for counseling above-mentioned issues.       PROCEDURE NOTE: With her informed consent, after explaining the benefits and risks of the procedure, using preservative-free normal saline for dilution, 1 cc per 100 units, 300 units of Botox were injected with EMG guidance as follows 75 units were injected into the hamstrings bilaterally.  75 units were injected into the medial and lateral gastrocnemius bilaterally.  She  tolerated the procedure well.    I like to see her in follow-up in about 4 to 6 weeks time to see how she is responding to these interventions and plan further treatment.    Thank you much for allow me to assist in her care.    Escobar Felix MD

## 2024-04-26 NOTE — NURSING NOTE
"Shayy Cortez is a 63 year old female who presents for:  Chief Complaint   Patient presents with    RECHECK     Dizziness and difficulty walking - botox        Initial Vitals:  /77   Pulse 81   SpO2 98%  Estimated body mass index is 21.34 kg/m  as calculated from the following:    Height as of 2/7/24: 1.589 m (5' 2.56\").    Weight as of 4/23/24: 53.9 kg (118 lb 12.8 oz).. There is no height or weight on file to calculate BSA. BP completed using cuff size: regular  Data Unavailable    Nursing Comments:     Jones Maya    "

## 2024-04-30 ENCOUNTER — DOCUMENTATION ONLY (OUTPATIENT)
Dept: PHARMACY | Facility: CLINIC | Age: 64
End: 2024-04-30
Payer: COMMERCIAL

## 2024-04-30 NOTE — PROGRESS NOTES
Skilled Nurse visit in the Patient Home to administer Privigen 25g.  No recent elevated temperature, fever, chills, productive cough, coughing for 3 weeks or longer or hemoptysis, abnormal vital signs, night sweats, chest pain. No  decrease in your appetite, unexplained weight loss or fatigue.  No other new onset medical symptoms.  Current weight 118#.  Port-a-Cath accessed, 1 attempt, blood return verified.  Pre medicated with acetaminophen 650mg PO, diphenhydramine 50mg PO, and solu-cortef 100mg IVP.  No labs drawn.  Infusion completed without complication or reaction. Pt believes therapy is helping to manage symptoms related to therapy.     Thuy Freitas, RN, BSN  [facility]  [phone]  [email or web address]

## 2024-05-02 ENCOUNTER — DOCUMENTATION ONLY (OUTPATIENT)
Dept: NEUROLOGY | Facility: CLINIC | Age: 64
End: 2024-05-02
Payer: COMMERCIAL

## 2024-05-02 NOTE — PROGRESS NOTES
Social Security Disability benefits forms have been received, forms are going to be filled out then placed in Dr. Madison's folder for review and signature.   Niles Jamil EMT May 2, 2024

## 2024-05-08 NOTE — PROGRESS NOTES
Detailed form, will require completion by MD. Form placed in Dr Madison's folder for completion.    Prudence Quiles RN

## 2024-05-13 NOTE — PROGRESS NOTES
Completed Physical Residual Functional Capacity Assessment form faxed to [employer] (fax [phone]).    Prudence Quiles RN

## 2024-05-28 ENCOUNTER — DOCUMENTATION ONLY (OUTPATIENT)
Dept: PHARMACY | Facility: CLINIC | Age: 64
End: 2024-05-28
Payer: COMMERCIAL

## 2024-05-28 NOTE — PROGRESS NOTES
Skilled Nurse visit in the Patient Home to administer Privigen 25g.  No recent elevated temperature, fever, chills, productive cough, coughing for 3 weeks or longer or hemoptysis, abnormal vital signs, night sweats, chest pain. No  decrease in your appetite, unexplained weight loss or fatigue.  No other new onset medical symptoms.  Current weight 118#.  Port-a-Cath accessed, 1 attempt, blood return verified.  Pre medicated with acetaminophen 650mg PO, diphenhydramine 50mg PO, and solu-cortef 100mg IVP.  No labs drawn.  Infusion completed without complication or reaction. Pt believes therapy is helping to manage symptoms related to therapy.     Thuy Freitas, RN, BSN  [facility]  [phone]  [email or web address]

## 2024-06-14 ENCOUNTER — OFFICE VISIT (OUTPATIENT)
Dept: PHYSICAL MEDICINE AND REHAB | Facility: CLINIC | Age: 64
End: 2024-06-14
Payer: COMMERCIAL

## 2024-06-14 VITALS — DIASTOLIC BLOOD PRESSURE: 79 MMHG | OXYGEN SATURATION: 98 % | SYSTOLIC BLOOD PRESSURE: 108 MMHG | HEART RATE: 75 BPM

## 2024-06-14 DIAGNOSIS — M62.838 SPASM OF MUSCLE: ICD-10-CM

## 2024-06-14 DIAGNOSIS — R26.9 ABNORMAL GAIT: ICD-10-CM

## 2024-06-14 DIAGNOSIS — G25.82 STIFF-MAN SYNDROME: Primary | ICD-10-CM

## 2024-06-14 PROCEDURE — 99214 OFFICE O/P EST MOD 30 MIN: CPT | Performed by: PHYSICAL MEDICINE & REHABILITATION

## 2024-06-14 NOTE — PROGRESS NOTES
CC: Gait disturbance, muscle pain and dizziness in the setting of stiff person syndrome and rheumatoid arthritis.     63 year old female with PMH of cutaneous melanoma of the abdomen s/p one year treatment of nivolumab with development of progressive tremor, transient vertigo, abnormal eye movements and gait instability despite cessation of Nivolumab that started in August 2023. 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 Pt completed 5 day course of solumedrol and IVIG w/ improvement in tone, hyperreflexia, and tremor noted.  [facility] to 1/26/2024.  She was discharged home.  In addition to managing her neurologic status, she also has required medical mgmt of her urinary retention which is improving, constipation improving, blood sugars management due to high dose steroids and long term taper, pain, and mental health.     Her significant other who accompanies her notes that he has noticed continued problems with memory, problem-solving and thinking in general.  Even though she is better compared to when she was at the [facility], her persistent issues frustrated her.  She was able to walk without walker but was feeling unsteady and shuffling.  She has seen her ophthalmologist and currently is without additional interventions.  She has followed up with her neurologist and plans to have ideation and workup for hide impaired cognition and fogginess.    Since her last visit on 4/26/2024, she has noticed improvement in her walking following Botox.  Her significant other as well as the physical therapist concurred with that assessment as well.    She continues to feel fatigue and dizziness if she moves quickly.  She also has some discomfort around the thighs at times.    Current Outpatient Medications   Medication Sig Dispense Refill    acetaminophen (TYLENOL) 500 MG tablet Take 500-1,000 mg by mouth every 6 hours as needed for mild pain      baclofen (LIORESAL) 10  MG tablet Take 0.5 tablets (5 mg) by mouth 2 times daily for 7 days, THEN 1 tablet (10 mg) 2 times daily for 180 days. 60 tablet 5    blood glucose (NO BRAND SPECIFIED) lancets standard Use to test blood sugar 4 times daily or as directed. Henok Contour Microlet lancets 120 Lancet 0    blood glucose (NO BRAND SPECIFIED) test strip To use to test glucose level in the blood Use to test blood sugar  4 times daily as directed. To accompany glucose monitor brands per insurance coverage. 100 strip 0    calcium carbonate 500 mg, elemental, (OSCAL) 500 MG tablet Take 1 tablet (500 mg) by mouth 2 times daily (with meals) 120 tablet 0    citalopram (CELEXA) 20 MG tablet Take 1.5 tablets (30 mg) by mouth daily 45 tablet 11    clonazePAM (KLONOPIN) 0.5 MG tablet Take 1 tablet (0.5 mg) by mouth daily 60 tablet 5    clonazePAM (KLONOPIN) 0.5 MG tablet Take 1 tablet (0.5 mg) by mouth daily as needed for anxiety (Tremors) 30 tablet 5    clonazePAM (KLONOPIN) 1 MG tablet Take 1 tablet (1 mg) by mouth at bedtime 60 tablet 5    folic acid (FOLVITE) 1 MG tablet Take 1 tablet (1,000 mcg) by mouth daily 30 tablet 0    immune globulin - sucrose free 10 % injection Inject 25.7 g into the vein every 28 (twenty-eight) days      levothyroxine (SYNTHROID/LEVOTHROID) 50 MCG tablet Take 1 tablet (50 mcg) by mouth daily 30 tablet 0    pantoprazole (PROTONIX) 40 MG EC tablet Take 1 tablet (40 mg) by mouth every morning (before breakfast) 30 tablet 0    polyethylene glycol (MIRALAX) 17 GM/Dose powder Take 17 g by mouth daily 510 g 0    potassium chloride ER (MICRO-K) 10 MEQ CR capsule Take 1 capsule (10 mEq) by mouth 2 times daily 60 capsule 0    predniSONE (DELTASONE) 5 MG tablet Take 1 tablet (5 mg) by mouth every morning 30 tablet 3    Vitamin D3 (CHOLECALCIFEROL) 25 mcg (1000 units) tablet Take 2 tablets (50 mcg) by mouth daily 60 tablet 0    citalopram (CELEXA) 10 MG tablet Take 1 tablet (10 mg) by mouth daily (Patient not taking: Reported on  4/26/2024) 30 tablet 11    citalopram (CELEXA) 20 MG tablet Take 1 tablet (20 mg) by mouth daily (Patient not taking: Reported on 4/26/2024) 30 tablet 11           /79   Pulse 75   SpO2 98%        On examination, patient is alert and cooperative.  Her vitals are stable.  She is afebrile.     Speech is fluent.  Affect is pleasant.     HEENT is negative.  Extraocular movements are intact.  Face is symmetric.  Tongue is midline.  Neck is supple.     She is able to move her upper extremities functionally.  She is able to carry out straight leg raising test.  She is able to do Satish's.     Neurologically she has no sensory deficits.  She has good strength.  She has improvement in tone without clonus at the ankles.  Tone is increased in the hamstrings bilaterally and the gastrocnemius bilaterally.  Antony's positive.  Plantars are positive.     No focal areas of tenderness were elicited at either the cervical/lumbar/sacroiliac region.  Romberg is negative.     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  Her current hypertonia and stiffness interferes with her gait and has improved with neurotoxin focally at the hamstrings and the gastrocnemius.  I would increase her dose to 400 units.  Anticipate onset of action within 1 to 3 days, peak in 2 weeks and a duration of 12 weeks.  If she gets good relief from this, this could be continued on a regular basis.     This was reviewed at length with the patient and her significant other.  All questions were answered to their satisfaction.  30 minutes spent for this visit, exclusive of the procedure, greater than 50% was for counseling above-mentioned issues.     Thank you much for allow me to assist in her care.     Escobar Felix MD

## 2024-06-14 NOTE — NURSING NOTE
"Shayy Cortez is a 63 year old female who presents for:  Chief Complaint   Patient presents with    RECHECK     PT says her legs are better but she reports tightness on the outside of her right leg above the knee when fully extended. Still experiencing the fatigue and dizziness        Initial Vitals:  /79   Pulse 75   SpO2 98%  Estimated body mass index is 21.34 kg/m  as calculated from the following:    Height as of 2/7/24: 1.589 m (5' 2.56\").    Weight as of 4/23/24: 53.9 kg (118 lb 12.8 oz).. There is no height or weight on file to calculate BSA. BP completed using cuff size: regular  Data Unavailable    Nursing Comments:     Jones Maya    "

## 2024-06-14 NOTE — LETTER
6/14/2024       RE: Shayy Cortez  [address]  [facility] [place] [address]       Dear Colleague,    Thank you for referring your patient, Shayy Cortez, to the [facility] at [facility]. Please see a copy of my visit note below.    CC: Gait disturbance, muscle pain and dizziness in the setting of stiff person syndrome and rheumatoid arthritis.     63 year old female with PMH of cutaneous melanoma of the abdomen s/p one year treatment of nivolumab with development of progressive tremor, transient vertigo, abnormal eye movements and gait instability despite cessation of Nivolumab that started in August 2023. 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 Pt completed 5 day course of solumedrol and IVIG w/ improvement in tone, hyperreflexia, and tremor noted.  [facility] from 1/[facility]24 to 1/26/2024.  She was discharged home.  In addition to managing her neurologic status, she also has required medical mgmt of her urinary retention which is improving, constipation improving, blood sugars management due to high dose steroids and long term taper, pain, and mental health.     Her significant other who accompanies her notes that he has noticed continued problems with memory, problem-solving and thinking in general.  Even though she is better compared to when she was at the ARU, her persistent issues frustrated her.  She was able to walk without walker but was feeling unsteady and shuffling.  She has seen her ophthalmologist and currently is without additional interventions.  She has followed up with her neurologist and plans to have ideation and workup for hide impaired cognition and fogginess.    Since her last visit on 4/26/2024, she has noticed improvement in her walking following Botox.  Her significant other as well as the physical therapist concurred with that assessment as well.    She continues to feel fatigue  and dizziness if she moves quickly.  She also has some discomfort around the thighs at times.    Current Outpatient Medications   Medication Sig Dispense Refill    acetaminophen (TYLENOL) 500 MG tablet Take 500-1,000 mg by mouth every 6 hours as needed for mild pain      baclofen (LIORESAL) 10 MG tablet Take 0.5 tablets (5 mg) by mouth 2 times daily for 7 days, THEN 1 tablet (10 mg) 2 times daily for 180 days. 60 tablet 5    blood glucose (NO BRAND SPECIFIED) lancets standard Use to test blood sugar 4 times daily or as directed. Henok Contour Microlet lancets 120 Lancet 0    blood glucose (NO BRAND SPECIFIED) test strip To use to test glucose level in the blood Use to test blood sugar  4 times daily as directed. To accompany glucose monitor brands per insurance coverage. 100 strip 0    calcium carbonate 500 mg, elemental, (OSCAL) 500 MG tablet Take 1 tablet (500 mg) by mouth 2 times daily (with meals) 120 tablet 0    citalopram (CELEXA) 20 MG tablet Take 1.5 tablets (30 mg) by mouth daily 45 tablet 11    clonazePAM (KLONOPIN) 0.5 MG tablet Take 1 tablet (0.5 mg) by mouth daily 60 tablet 5    clonazePAM (KLONOPIN) 0.5 MG tablet Take 1 tablet (0.5 mg) by mouth daily as needed for anxiety (Tremors) 30 tablet 5    clonazePAM (KLONOPIN) 1 MG tablet Take 1 tablet (1 mg) by mouth at bedtime 60 tablet 5    folic acid (FOLVITE) 1 MG tablet Take 1 tablet (1,000 mcg) by mouth daily 30 tablet 0    immune globulin - sucrose free 10 % injection Inject 25.7 g into the vein every 28 (twenty-eight) days      levothyroxine (SYNTHROID/LEVOTHROID) 50 MCG tablet Take 1 tablet (50 mcg) by mouth daily 30 tablet 0    pantoprazole (PROTONIX) 40 MG EC tablet Take 1 tablet (40 mg) by mouth every morning (before breakfast) 30 tablet 0    polyethylene glycol (MIRALAX) 17 GM/Dose powder Take 17 g by mouth daily 510 g 0    potassium chloride ER (MICRO-K) 10 MEQ CR capsule Take 1 capsule (10 mEq) by mouth 2 times daily 60 capsule 0    predniSONE  (DELTASONE) 5 MG tablet Take 1 tablet (5 mg) by mouth every morning 30 tablet 3    Vitamin D3 (CHOLECALCIFEROL) 25 mcg (1000 units) tablet Take 2 tablets (50 mcg) by mouth daily 60 tablet 0    citalopram (CELEXA) 10 MG tablet Take 1 tablet (10 mg) by mouth daily (Patient not taking: Reported on 4/26/2024) 30 tablet 11    citalopram (CELEXA) 20 MG tablet Take 1 tablet (20 mg) by mouth daily (Patient not taking: Reported on 4/26/2024) 30 tablet 11           /79   Pulse 75   SpO2 98%        On examination, patient is alert and cooperative.  Her vitals are stable.  She is afebrile.     Speech is fluent.  Affect is pleasant.     HEENT is negative.  Extraocular movements are intact.  Face is symmetric.  Tongue is midline.  Neck is supple.     She is able to move her upper extremities functionally.  She is able to carry out straight leg raising test.  She is able to do Satish's.     Neurologically she has no sensory deficits.  She has good strength.  She has improvement in tone without clonus at the ankles.  Tone is increased in the hamstrings bilaterally and the gastrocnemius bilaterally.  Antony's positive.  Plantars are positive.     No focal areas of tenderness were elicited at either the cervical/lumbar/sacroiliac region.  Romberg is negative.     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  Her current hypertonia and stiffness interferes with her gait and has improved with neurotoxin focally at the hamstrings and the gastrocnemius.  I would increase her dose to 400 units.  Anticipate onset of action within 1 to 3 days, peak in 2 weeks and a duration of 12 weeks.  If she gets good relief from this, this could be continued on a regular basis.     This was reviewed at length with the patient and her  significant other.  All questions were answered to their satisfaction.  30 minutes spent for this visit, exclusive of the procedure, greater than 50% was for counseling above-mentioned issues.     Thank you much for allow me to assist in her care.         Again, thank you for allowing me to participate in the care of your patient.      Sincerely,    Escobar Felix MD

## 2024-06-26 ENCOUNTER — TELEPHONE (OUTPATIENT)
Dept: NEUROLOGY | Facility: CLINIC | Age: 64
End: 2024-06-26
Payer: COMMERCIAL

## 2024-06-26 DIAGNOSIS — G25.82 STIFF-MAN SYNDROME: ICD-10-CM

## 2024-06-26 RX ORDER — PREDNISONE 5 MG/1
5 TABLET ORAL EVERY MORNING
Qty: 30 TABLET | Refills: 5 | Status: SHIPPED | OUTPATIENT
Start: 2024-06-26

## 2024-06-26 NOTE — TELEPHONE ENCOUNTER
Patient confirmed scheduled appointment:  Date: 8/23/24  Time: 9:30 am  Visit type: Return MS  Provider: Lso  Location: [facility]  Testing/imaging: Neurpsych testing on 7/26  Additional notes:     Tania Piedra on 6/26/2024 at 3:05 PM

## 2024-06-26 NOTE — TELEPHONE ENCOUNTER
Received refill request for prednisone 5 mg from [facility]; Patient was last seen in April 2024 and has no follow up appointment with Dr Madison. Routed to Dr Madison for signature and reminder sent to pt to schedule follow-up.     Prudence Quiles RN

## 2024-07-01 ENCOUNTER — DOCUMENTATION ONLY (OUTPATIENT)
Dept: NEUROLOGY | Facility: CLINIC | Age: 64
End: 2024-07-01
Payer: COMMERCIAL

## 2024-07-01 NOTE — PROGRESS NOTES
Outpatient balance initial assessment has been received from [facility], orders placed in Dr. Madison's folder for review and signature.   Niles Jamil EMT July 1, 2024

## 2024-07-03 NOTE — PROGRESS NOTES
Orders for initial assessment have been signed and faxed back at [phone].  Niles Jamil EMT July 3, 2024      "I am great"

## 2024-07-19 ENCOUNTER — OFFICE VISIT (OUTPATIENT)
Dept: PHYSICAL MEDICINE AND REHAB | Facility: CLINIC | Age: 64
End: 2024-07-19
Payer: COMMERCIAL

## 2024-07-19 DIAGNOSIS — M62.838 SPASM OF MUSCLE: ICD-10-CM

## 2024-07-19 DIAGNOSIS — G25.82 STIFF-MAN SYNDROME: Primary | ICD-10-CM

## 2024-07-19 DIAGNOSIS — R26.9 ABNORMAL GAIT: ICD-10-CM

## 2024-07-19 PROCEDURE — 64643 CHEMODENERV 1 EXTREM 1-4 EA: CPT | Performed by: PHYSICAL MEDICINE & REHABILITATION

## 2024-07-19 PROCEDURE — 99213 OFFICE O/P EST LOW 20 MIN: CPT | Mod: 25 | Performed by: PHYSICAL MEDICINE & REHABILITATION

## 2024-07-19 PROCEDURE — 64642 CHEMODENERV 1 EXTREMITY 1-4: CPT | Performed by: PHYSICAL MEDICINE & REHABILITATION

## 2024-07-19 PROCEDURE — 95874 GUIDE NERV DESTR NEEDLE EMG: CPT | Performed by: PHYSICAL MEDICINE & REHABILITATION

## 2024-07-19 NOTE — LETTER
7/19/2024       RE: Shayy Cortez  [address]  [place] [address]     Dear Colleague,    Thank you for referring your patient, Shayy Cortez, to the [facility]. Please see a copy of my visit note below.    CC: Gait disturbance, muscle pain and dizziness in the setting of stiff person syndrome and rheumatoid arthritis.    Last seen for Botox on 4/26/24.Since her last visit, she has noticed improvement in her walking following Botox. Her significant other as well as the physical therapist concurred with that assessment as well.      63 year old female with PMH of cutaneous melanoma of the abdomen s/p one year treatment of nivolumab with development of progressive tremor, transient vertigo, abnormal eye movements and gait instability despite cessation of Nivolumab that started in August 2023. 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 Pt completed 5 day course of solumedrol and IVIG w/ improvement in tone, hyperreflexia, and tremor noted.  She [facility] an [facility] at [facility] from 1/18/2024 to 1/26/2024.  She was discharged home.  In addition to managing her neurologic status, she also has required medical mgmt of her urinary retention which is improving, constipation improving, blood sugars management due to high dose steroids and long term taper, pain, and mental health.     Her significant other who accompanies her notes that he has noticed continued problems with memory, problem-solving and thinking in general.  Even though she is better compared to when she was at the ARU, her persistent issues frustrate her.  She is able to walk without walker but feels unsteady and shuffling.  She has seen her ophthalmologist and currently is without additional interventions.  She has followed up with her neurologist and received the clearance to undergo Botox injections.        Current Outpatient Prescriptions       Current Outpatient Prescriptions          Current Outpatient Medications   Medication Sig Dispense Refill    acetaminophen (TYLENOL) 500 MG tablet Take 500-1,000 mg by mouth every 6 hours as needed for mild pain        baclofen (LIORESAL) 10 MG tablet Take 0.5 tablets (5 mg) by mouth 2 times daily for 7 days, THEN 1 tablet (10 mg) 2 times daily for 180 days. 60 tablet 5    blood glucose (NO BRAND SPECIFIED) lancets standard Use to test blood sugar 4 times daily or as directed. Henok Contour Microlet lancets 120 Lancet 0    blood glucose (NO BRAND SPECIFIED) test strip To use to test glucose level in the blood Use to test blood sugar  4 times daily as directed. To accompany glucose monitor brands per insurance coverage. 100 strip 0    calcium carbonate 500 mg, elemental, (OSCAL) 500 MG tablet Take 1 tablet (500 mg) by mouth 2 times daily (with meals) 120 tablet 0    citalopram (CELEXA) 20 MG tablet Take 1.5 tablets (30 mg) by mouth daily 45 tablet 11    clonazePAM (KLONOPIN) 0.5 MG tablet Take 1 tablet (0.5 mg) by mouth daily 60 tablet 5    clonazePAM (KLONOPIN) 0.5 MG tablet Take 1 tablet (0.5 mg) by mouth daily as needed for anxiety (Tremors) 30 tablet 5    clonazePAM (KLONOPIN) 1 MG tablet Take 1 tablet (1 mg) by mouth at bedtime 60 tablet 5    folic acid (FOLVITE) 1 MG tablet Take 1 tablet (1,000 mcg) by mouth daily 30 tablet 0    immune globulin - sucrose free 10 % injection Inject 25.7 g into the vein every 28 (twenty-eight) days        levothyroxine (SYNTHROID/LEVOTHROID) 50 MCG tablet Take 1 tablet (50 mcg) by mouth daily 30 tablet 0    pantoprazole (PROTONIX) 40 MG EC tablet Take 1 tablet (40 mg) by mouth every morning (before breakfast) 30 tablet 0    polyethylene glycol (MIRALAX) 17 GM/Dose powder Take 17 g by mouth daily 510 g 0    potassium chloride ER (MICRO-K) 10 MEQ CR capsule Take 1 capsule (10 mEq) by mouth 2 times daily 60 capsule 0    predniSONE (DELTASONE) 5 MG tablet Take 1 tablet (5 mg) by  mouth every morning 30 tablet 3    Vitamin D3 (CHOLECALCIFEROL) 25 mcg (1000 units) tablet Take 2 tablets (50 mcg) by mouth daily 60 tablet 0    citalopram (CELEXA) 10 MG tablet Take 1 tablet (10 mg) by mouth daily (Patient not taking: Reported on 4/26/2024) 30 tablet 11    citalopram (CELEXA) 20 MG tablet Take 1 tablet (20 mg) by mouth daily (Patient not taking: Reported on 4/26/2024) 30 tablet 11               On examination, patient is alert and cooperative.  Her vitals are stable.  She is afebrile.     Speech is fluent.  Affect is pleasant.     HEENT is negative.  Extraocular movements are intact.  Face is symmetric.  Tongue is midline.  Neck is supple.     She is able to move her upper extremities functionally.  She is able to carry out straight leg raising test.  She is able to do Satish's.     Neurologically she has no sensory deficits.  She has good strength.  She has increased tone with clonus at the ankles.  Tone is increased in the hamstrings bilaterally and the gastrocnemius bilaterally.  Antony's positive.  Plantars are positive.     No focal areas of tenderness were elicited at either the cervical/lumbar/sacroiliac region.  Romberg is negative.     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  Her current hypertonia and stiffness interferes with her gait and has improved with neurotoxin focally at the hamstrings and the gastrocnemius.  I would increase her dose to 400 units.  Anticipate onset of action within 1 to 3 days, peak in 2 weeks and a duration of 12 weeks.  If she gets good relief from this, this could be continued on a regular basis.      This was reviewed at length with the patient and her significant other.  All questions were answered to their satisfaction.  20 minutes spent for  this visit, exclusive of the procedure, greater than 50% was for counseling above-mentioned issues.        PROCEDURE NOTE: With her informed consent, after explaining the benefits and risks of the procedure, using preservative-free normal saline for dilution, 1 cc per 100 units, 400 units of Botox were injected with EMG guidance as follows 100 units were injected into the hamstrings bilaterally.  100 units were injected into the medial and lateral gastrocnemius bilaterally.  She tolerated the procedure well.     Thank you much for allow me to assist in her care.     Escobar Felix MD

## 2024-07-19 NOTE — PROGRESS NOTES
CC: Gait disturbance, muscle pain and dizziness in the setting of stiff person syndrome and rheumatoid arthritis.    Last seen for Botox on 4/26/24.Since her last visit, she has noticed improvement in her walking following Botox. Her significant other as well as the physical therapist concurred with that assessment as well.      63 year old female with PMH of cutaneous melanoma of the abdomen s/p one year treatment of nivolumab with development of progressive tremor, transient vertigo, abnormal eye movements and gait instability despite cessation of Nivolumab that started in August 2023. 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 Pt completed 5 day course of solumedrol and IVIG w/ improvement in tone, hyperreflexia, and tremor noted.  [facility] from 1/[facility] to 1/26/2024.  She was discharged home.  In addition to managing her neurologic status, she also has required medical mgmt of her urinary retention which is improving, constipation improving, blood sugars management due to high dose steroids and long term taper, pain, and mental health.     Her significant other who accompanies her notes that he has noticed continued problems with memory, problem-solving and thinking in general.  Even though she is better compared to when she was at the [facility], her persistent issues frustrate her.  She is able to walk without walker but feels unsteady and shuffling.  She has seen her ophthalmologist and currently is without additional interventions.  She has followed up with her neurologist and received the clearance to undergo Botox injections.        Current Outpatient Prescriptions      Current Outpatient Prescriptions          Current Outpatient Medications   Medication Sig Dispense Refill    acetaminophen (TYLENOL) 500 MG tablet Take 500-1,000 mg by mouth every 6 hours as needed for mild pain        baclofen (LIORESAL) 10 MG tablet Take 0.5 tablets (5 mg) by mouth 2 times daily  for 7 days, THEN 1 tablet (10 mg) 2 times daily for 180 days. 60 tablet 5    blood glucose (NO BRAND SPECIFIED) lancets standard Use to test blood sugar 4 times daily or as directed. Henok Contour Microlet lancets 120 Lancet 0    blood glucose (NO BRAND SPECIFIED) test strip To use to test glucose level in the blood Use to test blood sugar  4 times daily as directed. To accompany glucose monitor brands per insurance coverage. 100 strip 0    calcium carbonate 500 mg, elemental, (OSCAL) 500 MG tablet Take 1 tablet (500 mg) by mouth 2 times daily (with meals) 120 tablet 0    citalopram (CELEXA) 20 MG tablet Take 1.5 tablets (30 mg) by mouth daily 45 tablet 11    clonazePAM (KLONOPIN) 0.5 MG tablet Take 1 tablet (0.5 mg) by mouth daily 60 tablet 5    clonazePAM (KLONOPIN) 0.5 MG tablet Take 1 tablet (0.5 mg) by mouth daily as needed for anxiety (Tremors) 30 tablet 5    clonazePAM (KLONOPIN) 1 MG tablet Take 1 tablet (1 mg) by mouth at bedtime 60 tablet 5    folic acid (FOLVITE) 1 MG tablet Take 1 tablet (1,000 mcg) by mouth daily 30 tablet 0    immune globulin - sucrose free 10 % injection Inject 25.7 g into the vein every 28 (twenty-eight) days        levothyroxine (SYNTHROID/LEVOTHROID) 50 MCG tablet Take 1 tablet (50 mcg) by mouth daily 30 tablet 0    pantoprazole (PROTONIX) 40 MG EC tablet Take 1 tablet (40 mg) by mouth every morning (before breakfast) 30 tablet 0    polyethylene glycol (MIRALAX) 17 GM/Dose powder Take 17 g by mouth daily 510 g 0    potassium chloride ER (MICRO-K) 10 MEQ CR capsule Take 1 capsule (10 mEq) by mouth 2 times daily 60 capsule 0    predniSONE (DELTASONE) 5 MG tablet Take 1 tablet (5 mg) by mouth every morning 30 tablet 3    Vitamin D3 (CHOLECALCIFEROL) 25 mcg (1000 units) tablet Take 2 tablets (50 mcg) by mouth daily 60 tablet 0    citalopram (CELEXA) 10 MG tablet Take 1 tablet (10 mg) by mouth daily (Patient not taking: Reported on 4/26/2024) 30 tablet 11    citalopram (CELEXA) 20 MG  tablet Take 1 tablet (20 mg) by mouth daily (Patient not taking: Reported on 4/26/2024) 30 tablet 11               On examination, patient is alert and cooperative.  Her vitals are stable.  She is afebrile.     Speech is fluent.  Affect is pleasant.     HEENT is negative.  Extraocular movements are intact.  Face is symmetric.  Tongue is midline.  Neck is supple.     She is able to move her upper extremities functionally.  She is able to carry out straight leg raising test.  She is able to do Satish's.     Neurologically she has no sensory deficits.  She has good strength.  She has increased tone with clonus at the ankles.  Tone is increased in the hamstrings bilaterally and the gastrocnemius bilaterally.  Antony's positive.  Plantars are positive.     No focal areas of tenderness were elicited at either the cervical/lumbar/sacroiliac region.  Romberg is negative.     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  Her current hypertonia and stiffness interferes with her gait and has improved with neurotoxin focally at the hamstrings and the gastrocnemius.  I would increase her dose to 400 units.  Anticipate onset of action within 1 to 3 days, peak in 2 weeks and a duration of 12 weeks.  If she gets good relief from this, this could be continued on a regular basis.      This was reviewed at length with the patient and her significant other.  All questions were answered to their satisfaction.  20 minutes spent for this visit, exclusive of the procedure, greater than 50% was for counseling above-mentioned issues.        PROCEDURE NOTE: With her informed consent, after explaining the benefits and risks of the procedure, using preservative-free normal saline for dilution, 1 cc per 100 units, 400 units of Botox  were injected with EMG guidance as follows 100 units were injected into the hamstrings bilaterally.  100 units were injected into the medial and lateral gastrocnemius bilaterally.  She tolerated the procedure well.     Thank you much for allow me to assist in her care.     Escobar Felix MD

## 2024-07-23 ENCOUNTER — DOCUMENTATION ONLY (OUTPATIENT)
Dept: PHARMACY | Facility: CLINIC | Age: 64
End: 2024-07-23
Payer: COMMERCIAL

## 2024-07-23 NOTE — PROGRESS NOTES
Skilled Nurse visit in the Patient Home to administer Privigen 25g.  No recent elevated temperature, fever, chills, productive cough, coughing for 3 weeks or longer or hemoptysis, abnormal vital signs, night sweats, chest pain. No  decrease in your appetite, unexplained weight loss or fatigue.  No other new onset medical symptoms.  Current weight 118#.  Port-a-Cath accessed, 1 attempt, blood return verified.  Pre medicated with acetaminophen 650mg PO, diphenhydramine 50mg PO, and solu-cortef 100mg IVP.  No labs drawn.  Infusion completed without complication or reaction. Pt believes therapy is helping to manage symptoms related to therapy.     Thuy Freitas, RN, BSN  [facility]  [phone]  [email or web address]

## 2024-07-26 ENCOUNTER — OFFICE VISIT (OUTPATIENT)
Dept: NEUROPSYCHOLOGY | Facility: CLINIC | Age: 64
End: 2024-07-26
Attending: PSYCHIATRY & NEUROLOGY
Payer: COMMERCIAL

## 2024-07-26 DIAGNOSIS — G31.84 MILD COGNITIVE IMPAIRMENT: Primary | ICD-10-CM

## 2024-07-26 PROCEDURE — 96133 NRPSYC TST EVAL PHYS/QHP EA: CPT

## 2024-07-26 PROCEDURE — 96139 PSYCL/NRPSYC TST TECH EA: CPT

## 2024-07-26 PROCEDURE — 96132 NRPSYC TST EVAL PHYS/QHP 1ST: CPT

## 2024-07-26 PROCEDURE — 90791 PSYCH DIAGNOSTIC EVALUATION: CPT

## 2024-07-26 PROCEDURE — 96138 PSYCL/NRPSYC TECH 1ST: CPT

## 2024-07-26 NOTE — LETTER
2024      RE: Shayy Cortez  [address]  [facility] [address]       NEUROPSYCHOLOGICAL EVALUATION  **CONFIDENTIAL**    **This is a medical document intended for communication with the referring provider. It is written in medical language and may contain abbreviations or verbiage that are unfamiliar. It may appear blunt or direct. This report and the results within are not intended to be interpreted in isolation without consultation with your medical provider. **    Name: Shayy Cortez Education: 14 years    (age): 1960 (63 years) OLVERA:  2024   Referral: Kandy Madison MD  Department of Neurology Handedness:  MRN (Epic): Right  [id number]      IDENTIFYING INFORMATION AND REASON FOR REFERRAL   Ms. 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 This evaluation was requested to provide a comprehensive assessment of her current neuropsychological status to inform treatment planning.     IMPRESSION  See below for relevant background information and detailed test results. See separate abstract for supporting documentation including a list of neuropsychological measures and test scores.    Ms. Cortez' neurocognitive profile revealed impaired performance on tests of executive functioning with visuospatial demands (i.e., nonverbal abstract reasoning and visuospatial planning/organization). Her learning of visual and verbal information was below expectation, though she was able to recall much of what she initially learned with intact recognition across memory tests. Specifically, encoding of verbal information was low average to exceptionally low and encoding of visual information was exceptionally low. Weaknesses were noted on tests of cognitive inhibition and verbal processing speed. Performances were within expectation across other cognitive tests including immediate auditory  attention and working memory, psychomotor processing speed, language, spatial perception and basic visuoconstruction, verbal reasoning, and mental flexibility. Assessment of current psychological and emotional status revealed moderate symptoms of depression and anxiety.     Overall, Ms. Cortez exhibited impaired performance on tests of executive functioning (particularly with visuospatial demands), and learning. The cause of her cognitive difficulties is likely multifactorial including autoimmune disease and remote history of heavy alcohol and methamphetamine use. Longstanding depression and anxiety, stress, pain, and fatigue are also likely contributory. Findings suggest a diagnosis of mild cognitive impairment and ongoing monitoring of her cognitive status over time is recommended.     DIAGNOSTIC IMPRESSIONS (ICD-10)  Mild cognitive impairment / mild neurocognitive disorder due to multiple etiologies (G31.84)    RECOMMENDATIONS  Ms. Cortez is encouraged to live a healthy lifestyle that promotes brain health including getting appropriate exercise, as advised by her healthcare providers, practicing good sleep hygiene, continued abstinence from tobacco, drugs, and alcohol, and eating healthy.    Ms. Cortez endorsed moderate symptoms of depression and anxiety. Therefore, psychotherapeutic interventions to address these mental health symptoms likely would be helpful. [facility] can be reached at [phone]. Additionally, a number of therapists can be found in your local community through [email or web address].  Ms. Cortez is encouraged to utilize the following behavioral strategies to maximize cognitive functioning in her daily life:   -Eliminate distraction. Eliminating environmental distracters can facilitate attention and memory performance. For example, turning off music or the television while having a conversation, so that all of your attention is focused on the task at hand.  -Engage in  calming activities that increase focus on the present. Incorporating mindfulness techniques such as meditation, relaxation, yoga, and moderate cardiovascular exercise, into your daily routine will help keep you present-oriented and consequently improve attention and memory. Apps like Calm, and [employer]br[employer], have guided meditations and mindfulness tools (search  mindfulness meditation ).  -Pay mindful attention. You may find that you need to make a conscious effort to pay attention to conversations or when learning new information. When attention is not focused, it is difficult for the brain to learn new information. Thus, making a mindful effort to pay attention as you go through daily tasks will assist and facilitate memory recall later on.  -Allow for time. Take the time needed to learn and recall information. Some people tend to worry if they can't immediately recall something. Instead, you should take time to express a thought, or complete a task rather than be critical or harsh on yourself.  -Use external aids to increase structure in daily life. Ongoing use of compensatory strategies such as notes, lists, and a centralized calendar are supported. Tools such as smart phones with alarms and reminders are helpful in bringing structure to daily activities.   The current evaluation will serve as an objective cognitive baseline against which results of future evaluations may be compared. Ms. Cortez may be referred for a follow-up neuropsychological evaluation in 12-24 months to objectively assess neurocognitive change.     Thank you for the opportunity to participate in Ms. Cortez's care.  These findings and recommendations were reviewed with the patient in a separate feedback session on 8/7/2024 and all her questions were answered. If you have any questions regarding this evaluation, please do not hesitate to contact me.       Anne Umanzor, Ph.D.,   Clinical Neuropsychologist    RELEVANT BACKGROUND INFORMATION  AND SUPPORTING DOCUMENTATION  Gathered from a clinical interview with the patient and her significant other, and reviews of electronic medical record.     History of Presenting Problem(s)  Ms. 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 She reported cognitive complaints since last fall in the context of several physical changes and side effects of chemotherapy and immunotherapy. Specifically, she reported trouble with word-finding and described her words as jumbled. She noted trouble recalling details of conversations, forgetting names of familiar people, repeating things, taking longer to complete tasks, inattentiveness, and general sense of brain fog. 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 Functionally, she noted trouble following recipes but denied problems preparing very simple meals. She independently manages her appointments with a calendar that she checks multiple times per day. She manages her medications independently without trouble. Her partner has always managed their finances, and she denied problems with basic self-care. She stopped driving last fall in the context of double vision and dizziness. She noted onset of lightheadedness, dizziness, nausea, weight loss, weakness, and double vision in August 2023 in the context of her cancer treatments. 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 Emotionally, she reported a lot of psychosocial stress and worries about her health. She described a longstanding history of depression and anxiety with ongoing low mood and anxiety (e.g., worries, tension) that are currently being managed with medications. She denied any  changes to her behavior or personality.     Neurodiagnostic Findings   Brain MRI w/wo contrast (1/9/2024) FINDINGS: INTRACRANIAL CONTENTS: No acute or subacute infarct. No mass, acute hemorrhage, or extra-axial fluid collections. Scattered nonspecific T2/FLAIR hyperintensities within the cerebral white matter most consistent with mild chronic microvascular ischemic change. Mild generalized cerebral atrophy. No hydrocephalus. Normal position of the cerebellar tonsils. No pathologic contrast enhancement.   Head CT/CTA (10/9/2023) IMPRESSION:  1. No acute intracranial hemorrhage.  2. No significant large vessel stenosis or occlusion in the head or neck.  3. Tiny outpouching involving the left intracranial ICA, suspicious for a tiny aneurysm. Recommend neurology referral for further management and follow-up.     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 Denied trouble with sleep onset or maintenance with Klonopin and she noted she sleeps a lot and is tired all the time. Sleep is disrupted by long history of working night shift. History of sleep talking and arm movements last year without any recent parasomnic behaviors. Denied history of sleep apnea.  Exercise: Minimal. PT  Pain: Lower leg pain described as aching and burning. Chronic joint pain related to RA.     Psychiatric History  Ms. Cortez reported a longstanding history of depression with ongoing low mood, hypersomnia, psychomotor slowing, fatigue, and concentration difficulties.  She noted a longstanding history of anxiety characterized by worries about her health, shakiness, and tension. She noted Celexa and Klonopin help with her anxiety as she used to worry excessively about everything.   She otherwise denied history of hypomanic or manic mood symptoms,  compulsive behaviors, alteration of sensory perceptual processes or disordered thought.  Suicidality/ Self-harm: She denied any suicidal ideation, plan, or intent  Psychiatric treatment: Currently prescribed Celexa and Klonopin for depression and anxiety. She reportedly engaged in psychotherapy many years ago.    Substance Use History  Alcohol: Sober 18 years. She struggled to discuss quantity and duration of her alcohol use but indicated periods of heavy alcohol use (binge drinking).  Nicotine: None currently, quit ~12 years ago  Cannabis: Denied  Drug use: History of methamphetamine use in her 30s characterized by nearly daily use for ~5 years.  Substance Use treatment: Participated in substance use treatment 5 times.    Current Medications (per EMR)  Ms. Cortez  has a current medication list which includes the following prescription(s): acetaminophen, baclofen, blood glucose, blood glucose, calcium carbonate 500 mg (elemental), citalopram, citalopram, citalopram, clonazepam, clonazepam, clonazepam, folic acid, immune globulin - sucrose free, levothyroxine, pantoprazole, polyethylene glycol, potassium chloride er, prednisone, and vitamin d3, and the following Facility-Administered Medications: botulinum toxin type a and botulinum toxin type a.    Developmental, Educational, & Occupational History  Gestational: Full-term   complications: None reported  Developmental milestones: Met at expected ages  Childhood medical / behavioral / emotional / academic problems: Denied  Native Language: English  Education: Left school after 9th grade. Earned her GED. 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 Currently on disability.     Psychosocial History  Born and raised in [place], [place]  Marital: . In a relationship with her significant other for ~19 years  Children: 4 children  Housing: Lives with  significant other  Psychosocial support: Good social support network including her partner and family    Family History  No known family history of dementia or neurological disorders  Family history of autoimmune conditions (connective tissue disorder, lupus) in her daughter and niece.    RESULTS  See separate abstract for list of neuropsychological measures and test scores. Descriptive ranges are based on American Academy of Clinical Neuropsychology (2020) consensus guidelines, or test manuals where appropriate. All standardized scores are adjusted for age and additional adjustments for demographic factors such as education were performed where applicable.    PRE-MORBID ABILITY: Premorbid abilities were estimated within the average range based on single word reading ability and demographic factors including sex, ethnicity, education, occupation, and geographic region.  ATTENTION/EXECUTIVE FUNCTIONS: Immediate auditory attention and working memory performances were low average. Verbal abstract reasoning performance was average. Visual reasoning through pattern identification was below average. Performance on a speeded test of set-shifting/cognitive flexibility with psychomotor demands was low average with 1 error. Speeded verbal set-shifting was low average to below average. Copy of a complex figure requiring planning and organization was exceptionally low and notable for very quick and careless drawing with a dysexecutive and piecemeal approach and inattention to detail. Response inhibition performance was low average to below average. Psychomotor processing speed performances were low average to high average. Verbal processing speed scores were low average to below average.   LANGUAGE: Confrontation naming performance was average. Letter-cue verbal fluency performance was average. Semantic verbal fluency performance was low average.   VISUOSPATIAL PROCESSING: Performance on a spatial perception task requiring  judgement of angled lines was high average. Copy of a sheet of simple figures was within normal limits and without error. Copy of a complex figure was exceptionally low and notable for quick and careless drawing, inattention to detail, and a disorganized approach resulting in extra lines, missing lines, incorrect proportion of elements, misplaced elements, and distortion of the figural gestalt.  LEARNING AND MEMORY: Initial encoding of a word list over multiple learning trials was exceptionally low with minimal benefit from repetition. Delayed recall of the list was below average though she was able to recall all the words she initially encoded. Recognition of the word list was average. Initial encoding of contextualized verbal information in the form of short stories was low average and delayed retrieval was below average. Recognition of story details was average. Encoding of a sheet of simple figures and their spatial locations was exceptionally low. Delayed recall was exceptionally low though she was able to recall much of what little visual information was initially encoded.  Recognition was within normal limits and without error.  PSYCHOLOGICAL AND BEHAVIORAL: She endorsed moderate symptoms of depression and anxiety on self-report questionnaires.    PERFORMANCE VALIDITY: Performance on neuropsychological tests is dependent upon a number of factors, including sufficient engagement and motivation, to reliably establish an examinee's level of cognitive functioning.  Based upon observations made throughout the evaluation, the patient did not appear to deliberately perform in a suboptimal manner and demonstrated good frustration tolerance on cognitively challenging tasks. Scores on dedicated and imbedded indices of performance validity were in the valid range. Overall, test results are believed to accurately represent the patient's current neurocognitive status.    BEHAVIORAL OBSERVATIONS  Presented on time and was  accompanied by her significant other  Alert, attentive and focused while undergoing testing  Appearance: Well-groomed, casually dressed  Gait/Posture: No abnormalities noted  Sensorimotor: No abnormalities noted  Behavior: Cooperative, pleasant, no behavioral abnormalities noted  Speech: Soft volume, fluent, articulate, normal rate and prosody; no conversational word finding difficulty  Thought process: Generally logical, linear, and goal-directed; often needed test instructions repeated  Thought content: Logical, appropriate   Affect: Flat; good eye contact  Mood: Euthymic  Insight and Judgment: Intact  Approach to testing: Careless during drawing tasks, otherwise generally efficient and deliberate; good frustration on cognitively demanding tasks  Rapport: Easily established and maintained      Activities included in this evaluation: CPT Code #Units Time (min)   Psychiatric diagnostic interview [id number] --   Test evaluation services by professional; first hour. [id number]   Test evaluation services by professional, additional hour (+) [id number] 2    Test administration and scoring by technician, first 30 mins [id number] 1 167   Test administration and scoring by technician, additional 30 mins (+) [id number] 5           URBAN AREVALO, PhD

## 2024-07-26 NOTE — LETTER
2024       RE: Shayy Cortez  [address]  [facility] [address]     Dear Colleague,    Thank you for referring your patient, Shayy Cortez, to the [facility] [place] at [facility]. Please see a copy of my visit note below.    NEUROPSYCHOLOGICAL EVALUATION  **CONFIDENTIAL**    **This is a medical document intended for communication with the referring provider. It is written in medical language and may contain abbreviations or verbiage that are unfamiliar. It may appear blunt or direct. This report and the results within are not intended to be interpreted in isolation without consultation with your medical provider. **    Name: Shayy Cortez Education: 14 years    (age): 1960 (63 years) OLVERA:  2024   Referral: Kandy Madison MD  Department of Neurology Handedness:  MRN (Epic): Right  [id number]      IDENTIFYING INFORMATION AND REASON FOR REFERRAL   Ms. 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 This evaluation was requested to provide a comprehensive assessment of her current neuropsychological status to inform treatment planning.     IMPRESSION  See below for relevant background information and detailed test results. See separate abstract for supporting documentation including a list of neuropsychological measures and test scores.    Ms. Cortez' neurocognitive profile revealed impaired performance on tests of executive functioning with visuospatial demands (i.e., nonverbal abstract reasoning and visuospatial planning/organization). Her learning of visual and verbal information was below expectation, though she was able to recall much of what she initially learned with intact recognition across memory tests. Specifically, encoding of verbal information was low average to exceptionally  low and encoding of visual information was exceptionally low. Weaknesses were noted on tests of cognitive inhibition and verbal processing speed. Performances were within expectation across other cognitive tests including immediate auditory attention and working memory, psychomotor processing speed, language, spatial perception and basic visuoconstruction, verbal reasoning, and mental flexibility. Assessment of current psychological and emotional status revealed moderate symptoms of depression and anxiety.     Overall, Ms. Cortez exhibited impaired performance on tests of executive functioning (particularly with visuospatial demands), and learning. The cause of her cognitive difficulties is likely multifactorial including autoimmune disease and remote history of heavy alcohol and methamphetamine use. Longstanding depression and anxiety, stress, pain, and fatigue are also likely contributory. Findings suggest a diagnosis of mild cognitive impairment and ongoing monitoring of her cognitive status over time is recommended.     DIAGNOSTIC IMPRESSIONS (ICD-10)  Mild cognitive impairment / mild neurocognitive disorder due to multiple etiologies (G31.84)    RECOMMENDATIONS  Ms. Cortez is encouraged to live a healthy lifestyle that promotes brain health including getting appropriate exercise, as advised by her healthcare providers, practicing good sleep hygiene, continued abstinence from tobacco, drugs, and alcohol, and eating healthy.    Ms. Cortez endorsed moderate symptoms of depression and anxiety. Therefore, psychotherapeutic interventions to address these mental health symptoms likely would be helpful. [facility] can be reached at [phone]. Additionally, a number of therapists can be found in your local community through [email or web address].  Ms. Cortez is encouraged to utilize the following behavioral strategies to maximize cognitive functioning in her daily life:   -Eliminate distraction.  Eliminating environmental distracters can facilitate attention and memory performance. For example, turning off music or the television while having a conversation, so that all of your attention is focused on the task at hand.  -Engage in calming activities that increase focus on the present. Incorporating mindfulness techniques such as meditation, relaxation, yoga, and moderate cardiovascular exercise, into your daily routine will help keep you present-oriented and consequently improve attention and memory. Apps like Calm, and [employer], have guided meditations and mindfulness tools (search  mindfulness meditation ).  -Pay mindful attention. You may find that you need to make a conscious effort to pay attention to conversations or when learning new information. When attention is not focused, it is difficult for the brain to learn new information. Thus, making a mindful effort to pay attention as you go through daily tasks will assist and facilitate memory recall later on.  -Allow for time. Take the time needed to learn and recall information. Some people tend to worry if they can't immediately recall something. Instead, you should take time to express a thought, or complete a task rather than be critical or harsh on yourself.  -Use external aids to increase structure in daily life. Ongoing use of compensatory strategies such as notes, lists, and a centralized calendar are supported. Tools such as smart phones with alarms and reminders are helpful in bringing structure to daily activities.   The current evaluation will serve as an objective cognitive baseline against which results of future evaluations may be compared. Ms. Cortez may be referred for a follow-up neuropsychological evaluation in 12-24 months to objectively assess neurocognitive change.     Thank you for the opportunity to participate in Ms. Cortez's care.  These findings and recommendations were reviewed with the patient in a separate feedback session  on 8/7/2024 and all her questions were answered. If you have any questions regarding this evaluation, please do not hesitate to contact me.       Anne Umanzor, Ph.D.,   Clinical Neuropsychologist    RELEVANT BACKGROUND INFORMATION AND SUPPORTING DOCUMENTATION  Gathered from a clinical interview with the patient and her significant other, and reviews of electronic medical record.     History of Presenting Problem(s)  Ms. 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 She reported cognitive complaints since last fall in the context of several physical changes and side effects of chemotherapy and immunotherapy. Specifically, she reported trouble with word-finding and described her words as jumbled. She noted trouble recalling details of conversations, forgetting names of familiar people, repeating things, taking longer to complete tasks, inattentiveness, and general sense of brain fog. 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 Functionally, she noted trouble following recipes but denied problems preparing very simple meals. She independently manages her appointments with a calendar that she checks multiple times per day. She manages her medications independently without trouble. Her partner has always managed their finances, and she denied problems with basic self-care. She stopped driving last fall in the context of double vision and dizziness. She noted onset of lightheadedness, dizziness, nausea, weight loss, weakness, and double vision in August 2023 in the context of her cancer treatments. 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 Emotionally, she reported a lot of  psychosocial stress and worries about her health. She described a longstanding history of depression and anxiety with ongoing low mood and anxiety (e.g., worries, tension) that are currently being managed with medications. She denied any changes to her behavior or personality.     Neurodiagnostic Findings   Brain MRI w/wo contrast (1/9/2024) FINDINGS: INTRACRANIAL CONTENTS: No acute or subacute infarct. No mass, acute hemorrhage, or extra-axial fluid collections. Scattered nonspecific T2/FLAIR hyperintensities within the cerebral white matter most consistent with mild chronic microvascular ischemic change. Mild generalized cerebral atrophy. No hydrocephalus. Normal position of the cerebellar tonsils. No pathologic contrast enhancement.   Head CT/CTA (10/9/2023) IMPRESSION:  1. No acute intracranial hemorrhage.  2. No significant large vessel stenosis or occlusion in the head or neck.  3. Tiny outpouching involving the left intracranial ICA, suspicious for a tiny aneurysm. Recommend neurology referral for further management and follow-up.     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 Denied trouble with sleep onset or maintenance with Klonopin and she noted she sleeps a lot and is tired all the time. Sleep is disrupted by long history of working night shift. History of sleep talking and arm movements last year without any recent parasomnic behaviors. Denied history of sleep apnea.  Exercise: Minimal. PT  Pain: Lower leg pain described as aching and burning. Chronic joint pain related to RA.     Psychiatric History  Ms. Cortez reported a longstanding history of depression with ongoing low mood, hypersomnia, psychomotor slowing, fatigue, and concentration difficulties.  She noted a longstanding history of anxiety  characterized by worries about her health, shakiness, and tension. She noted Celexa and Klonopin help with her anxiety as she used to worry excessively about everything.   She otherwise denied history of hypomanic or manic mood symptoms, compulsive behaviors, alteration of sensory perceptual processes or disordered thought.  Suicidality/ Self-harm: She denied any suicidal ideation, plan, or intent  Psychiatric treatment: Currently prescribed Celexa and Klonopin for depression and anxiety. She reportedly engaged in psychotherapy many years ago.    Substance Use History  Alcohol: Sober 18 years. She struggled to discuss quantity and duration of her alcohol use but indicated periods of heavy alcohol use (binge drinking).  Nicotine: None currently, quit ~12 years ago  Cannabis: Denied  Drug use: History of methamphetamine use in her 30s characterized by nearly daily use for ~5 years.  Substance Use treatment: Participated in substance use treatment 5 times.    Current Medications (per EMR)  Ms. Cortez  has a current medication list which includes the following prescription(s): acetaminophen, baclofen, blood glucose, blood glucose, calcium carbonate 500 mg (elemental), citalopram, citalopram, citalopram, clonazepam, clonazepam, clonazepam, folic acid, immune globulin - sucrose free, levothyroxine, pantoprazole, polyethylene glycol, potassium chloride er, prednisone, and vitamin d3, and the following Facility-Administered Medications: botulinum toxin type a and botulinum toxin type a.    Developmental, Educational, & Occupational History  Gestational: Full-term   complications: None reported  Developmental milestones: Met at expected ages  Childhood medical / behavioral / emotional / academic problems: Denied  Native Language: English  Education: Left school after 9th grade. Earned her GED. Earned her LPN degree and RN degree (associate degree in nursing)  Occupation: Nurse. Has not worked in the past 2 years in  the context of her health. Currently on disability.     Psychosocial History  Born and raised in [place], [place]  Marital: . In a relationship with her significant other for ~19 years  Children: 4 children  Housing: Lives with significant other  Psychosocial support: Good social support network including her partner and family    Family History  No known family history of dementia or neurological disorders  Family history of autoimmune conditions (connective tissue disorder, lupus) in her daughter and niece.    RESULTS  See separate abstract for list of neuropsychological measures and test scores. Descriptive ranges are based on American Academy of Clinical Neuropsychology (2020) consensus guidelines, or test manuals where appropriate. All standardized scores are adjusted for age and additional adjustments for demographic factors such as education were performed where applicable.    PRE-MORBID ABILITY: Premorbid abilities were estimated within the average range based on single word reading ability and demographic factors including sex, ethnicity, education, occupation, and geographic region.  ATTENTION/EXECUTIVE FUNCTIONS: Immediate auditory attention and working memory performances were low average. Verbal abstract reasoning performance was average. Visual reasoning through pattern identification was below average. Performance on a speeded test of set-shifting/cognitive flexibility with psychomotor demands was low average with 1 error. Speeded verbal set-shifting was low average to below average. Copy of a complex figure requiring planning and organization was exceptionally low and notable for very quick and careless drawing with a dysexecutive and piecemeal approach and inattention to detail. Response inhibition performance was low average to below average. Psychomotor processing speed performances were low average to high average. Verbal processing speed scores were low average to below average.   LANGUAGE:  Confrontation naming performance was average. Letter-cue verbal fluency performance was average. Semantic verbal fluency performance was low average.   VISUOSPATIAL PROCESSING: Performance on a spatial perception task requiring judgement of angled lines was high average. Copy of a sheet of simple figures was within normal limits and without error. Copy of a complex figure was exceptionally low and notable for quick and careless drawing, inattention to detail, and a disorganized approach resulting in extra lines, missing lines, incorrect proportion of elements, misplaced elements, and distortion of the figural gestalt.  LEARNING AND MEMORY: Initial encoding of a word list over multiple learning trials was exceptionally low with minimal benefit from repetition. Delayed recall of the list was below average though she was able to recall all the words she initially encoded. Recognition of the word list was average. Initial encoding of contextualized verbal information in the form of short stories was low average and delayed retrieval was below average. Recognition of story details was average. Encoding of a sheet of simple figures and their spatial locations was exceptionally low. Delayed recall was exceptionally low though she was able to recall much of what little visual information was initially encoded.  Recognition was within normal limits and without error.  PSYCHOLOGICAL AND BEHAVIORAL: She endorsed moderate symptoms of depression and anxiety on self-report questionnaires.    PERFORMANCE VALIDITY: Performance on neuropsychological tests is dependent upon a number of factors, including sufficient engagement and motivation, to reliably establish an examinee's level of cognitive functioning.  Based upon observations made throughout the evaluation, the patient did not appear to deliberately perform in a suboptimal manner and demonstrated good frustration tolerance on cognitively challenging tasks. Scores on dedicated  and imbedded indices of performance validity were in the valid range. Overall, test results are believed to accurately represent the patient's current neurocognitive status.    BEHAVIORAL OBSERVATIONS  Presented on time and was accompanied by her significant other  Alert, attentive and focused while undergoing testing  Appearance: Well-groomed, casually dressed  Gait/Posture: No abnormalities noted  Sensorimotor: No abnormalities noted  Behavior: Cooperative, pleasant, no behavioral abnormalities noted  Speech: Soft volume, fluent, articulate, normal rate and prosody; no conversational word finding difficulty  Thought process: Generally logical, linear, and goal-directed; often needed test instructions repeated  Thought content: Logical, appropriate   Affect: Flat; good eye contact  Mood: Euthymic  Insight and Judgment: Intact  Approach to testing: Careless during drawing tasks, otherwise generally efficient and deliberate; good frustration on cognitively demanding tasks  Rapport: Easily established and maintained      Activities included in this evaluation: CPT Code #Units Time (min)   Psychiatric diagnostic interview [id number] --   Test evaluation services by professional; first hour. [id number] [phone]   Test evaluation services by professional, additional hour (+) [id number]    Test administration and scoring by technician, first 30 mins [id number] [phone]   Test administration and scoring by technician, additional 30 mins (+) [id number]           Again, thank you for allowing me to participate in the care of your patient.      Sincerely,    URBAN AREVALO, PhD

## 2024-07-29 NOTE — NURSING NOTE
The patient was seen for neuropsychological evaluation at the request of Dr. Kandy Madison, for the purposes of diagnostic clarification and treatment planning. 167 minutes of test administration and scoring were provided by this writer, Jose Howard. Please see Dr. Anne Umanzor's report for a full interpretation of the findings.

## 2024-08-07 NOTE — PROGRESS NOTES
NEUROPSYCHOLOGICAL EVALUATION  **CONFIDENTIAL**    **This is a medical document intended for communication with the referring provider. It is written in medical language and may contain abbreviations or verbiage that are unfamiliar. It may appear blunt or direct. This report and the results within are not intended to be interpreted in isolation without consultation with your medical provider. **    Name: Shayy Cortez Education: 14 years    (age): 1960 (63 years) OLVERA:  2024   Referral: Kandy Madison MD  Department of Neurology Handedness:  MRN (Epic): Right  [id number]      IDENTIFYING INFORMATION AND REASON FOR REFERRAL   Ms. 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 This evaluation was requested to provide a comprehensive assessment of her current neuropsychological status to inform treatment planning.     IMPRESSION  See below for relevant background information and detailed test results. See separate abstract for supporting documentation including a list of neuropsychological measures and test scores.    Ms. Cortez' neurocognitive profile revealed impaired performance on tests of executive functioning with visuospatial demands (i.e., nonverbal abstract reasoning and visuospatial planning/organization). Her learning of visual and verbal information was below expectation, though she was able to recall much of what she initially learned with intact recognition across memory tests. Specifically, encoding of verbal information was low average to exceptionally low and encoding of visual information was exceptionally low. Weaknesses were noted on tests of cognitive inhibition and verbal processing speed. Performances were within expectation across other cognitive tests including immediate auditory attention and working memory, psychomotor processing speed, language, spatial  perception and basic visuoconstruction, verbal reasoning, and mental flexibility. Assessment of current psychological and emotional status revealed moderate symptoms of depression and anxiety.     Overall, Ms. Cortez exhibited impaired performance on tests of executive functioning (particularly with visuospatial demands), and learning. The cause of her cognitive difficulties is likely multifactorial including autoimmune disease and remote history of heavy alcohol and methamphetamine use. Longstanding depression and anxiety, stress, pain, and fatigue are also likely contributory. Findings suggest a diagnosis of mild cognitive impairment and ongoing monitoring of her cognitive status over time is recommended.     DIAGNOSTIC IMPRESSIONS (ICD-10)  Mild cognitive impairment / mild neurocognitive disorder due to multiple etiologies (G31.84)    RECOMMENDATIONS  Ms. Cortez is encouraged to live a healthy lifestyle that promotes brain health including getting appropriate exercise, as advised by her healthcare providers, practicing good sleep hygiene, continued abstinence from tobacco, drugs, and alcohol, and eating healthy.    Ms. Cortez endorsed moderate symptoms of depression and anxiety. Therefore, psychotherapeutic interventions to address these mental health symptoms likely would be helpful. [facility] can be reached at [phone]. Additionally, a number of therapists can be found in your local community through [email or web address].  Ms. Cortez is encouraged to utilize the following behavioral strategies to maximize cognitive functioning in her daily life:   -Eliminate distraction. Eliminating environmental distracters can facilitate attention and memory performance. For example, turning off music or the television while having a conversation, so that all of your attention is focused on the task at hand.  -Engage in calming activities that increase focus on the present. Incorporating  mindfulness techniques such as meditation, relaxation, yoga, and moderate cardiovascular exercise, into your daily routine will help keep you present-oriented and consequently improve attention and memory. Apps like Calm, and Shareaholicube, have guided meditations and mindfulness tools (search  mindfulness meditation ).  -Pay mindful attention. You may find that you need to make a conscious effort to pay attention to conversations or when learning new information. When attention is not focused, it is difficult for the brain to learn new information. Thus, making a mindful effort to pay attention as you go through daily tasks will assist and facilitate memory recall later on.  -Allow for time. Take the time needed to learn and recall information. Some people tend to worry if they can't immediately recall something. Instead, you should take time to express a thought, or complete a task rather than be critical or harsh on yourself.  -Use external aids to increase structure in daily life. Ongoing use of compensatory strategies such as notes, lists, and a centralized calendar are supported. Tools such as smart phones with alarms and reminders are helpful in bringing structure to daily activities.   The current evaluation will serve as an objective cognitive baseline against which results of future evaluations may be compared. Ms. Cortez may be referred for a follow-up neuropsychological evaluation in 12-24 months to objectively assess neurocognitive change.     Thank you for the opportunity to participate in Ms. Cortez's care.  These findings and recommendations were reviewed with the patient in a separate feedback session on 8/7/2024 and all her questions were answered. If you have any questions regarding this evaluation, please do not hesitate to contact me.       Anne Umanzor, Ph.D.,   Clinical Neuropsychologist    RELEVANT BACKGROUND INFORMATION AND SUPPORTING DOCUMENTATION  Gathered from a clinical interview with  the patient and her significant other, and reviews of electronic medical record.     History of Presenting Problem(s)  Ms. 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 She reported cognitive complaints since last fall in the context of several physical changes and side effects of chemotherapy and immunotherapy. Specifically, she reported trouble with word-finding and described her words as jumbled. She noted trouble recalling details of conversations, forgetting names of familiar people, repeating things, taking longer to complete tasks, inattentiveness, and general sense of brain fog. 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 Functionally, she noted trouble following recipes but denied problems preparing very simple meals. She independently manages her appointments with a calendar that she checks multiple times per day. She manages her medications independently without trouble. Her partner has always managed their finances, and she denied problems with basic self-care. She stopped driving last fall in the context of double vision and dizziness. She noted onset of lightheadedness, dizziness, nausea, weight loss, weakness, and double vision in August 2023 in the context of her cancer treatments. 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 Emotionally, she reported a lot of psychosocial stress and worries about her health. She described a longstanding history of depression and anxiety with ongoing low mood and anxiety (e.g., worries, tension) that are currently being managed with medications. She denied any changes to her behavior or personality.     Neurodiagnostic Findings    Brain MRI w/wo contrast (1/9/2024) FINDINGS: INTRACRANIAL CONTENTS: No acute or subacute infarct. No mass, acute hemorrhage, or extra-axial fluid collections. Scattered nonspecific T2/FLAIR hyperintensities within the cerebral white matter most consistent with mild chronic microvascular ischemic change. Mild generalized cerebral atrophy. No hydrocephalus. Normal position of the cerebellar tonsils. No pathologic contrast enhancement.   Head CT/CTA (10/9/2023) IMPRESSION:  1. No acute intracranial hemorrhage.  2. No significant large vessel stenosis or occlusion in the head or neck.  3. Tiny outpouching involving the left intracranial ICA, suspicious for a tiny aneurysm. Recommend neurology referral for further management and follow-up.     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 Denied trouble with sleep onset or maintenance with Klonopin and she noted she sleeps a lot and is tired all the time. Sleep is disrupted by long history of working night shift. History of sleep talking and arm movements last year without any recent parasomnic behaviors. Denied history of sleep apnea.  Exercise: Minimal. PT  Pain: Lower leg pain described as aching and burning. Chronic joint pain related to RA.     Psychiatric History  Ms. Cortez reported a longstanding history of depression with ongoing low mood, hypersomnia, psychomotor slowing, fatigue, and concentration difficulties.  She noted a longstanding history of anxiety characterized by worries about her health, shakiness, and tension. She noted Celexa and Klonopin help with her anxiety as she used to worry excessively about everything.   She otherwise denied history of hypomanic or manic mood symptoms, compulsive behaviors, alteration of sensory perceptual processes or  disordered thought.  Suicidality/ Self-harm: She denied any suicidal ideation, plan, or intent  Psychiatric treatment: Currently prescribed Celexa and Klonopin for depression and anxiety. She reportedly engaged in psychotherapy many years ago.    Substance Use History  Alcohol: Sober 18 years. She struggled to discuss quantity and duration of her alcohol use but indicated periods of heavy alcohol use (binge drinking).  Nicotine: None currently, quit ~12 years ago  Cannabis: Denied  Drug use: History of methamphetamine use in her 30s characterized by nearly daily use for ~5 years.  Substance Use treatment: Participated in substance use treatment 5 times.    Current Medications (per EMR)  Ms. Cortez  has a current medication list which includes the following prescription(s): acetaminophen, baclofen, blood glucose, blood glucose, calcium carbonate 500 mg (elemental), citalopram, citalopram, citalopram, clonazepam, clonazepam, clonazepam, folic acid, immune globulin - sucrose free, levothyroxine, pantoprazole, polyethylene glycol, potassium chloride er, prednisone, and vitamin d3, and the following Facility-Administered Medications: botulinum toxin type a and botulinum toxin type a.    Developmental, Educational, & Occupational History  Gestational: Full-term   complications: None reported  Developmental milestones: Met at expected ages  Childhood medical / behavioral / emotional / academic problems: Denied  Native Language: English  Education: Left school after 9th grade. Earned her GED. 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 Currently on disability.     Psychosocial History  Born and raised in [place], [place]  Marital: . In a relationship with her significant other for ~19 years  Children: 4 children  Housing: Lives with significant other  Psychosocial support: Good social support network  including her partner and family    Family History  No known family history of dementia or neurological disorders  Family history of autoimmune conditions (connective tissue disorder, lupus) in her daughter and niece.    RESULTS  See separate abstract for list of neuropsychological measures and test scores. Descriptive ranges are based on American Academy of Clinical Neuropsychology (2020) consensus guidelines, or test manuals where appropriate. All standardized scores are adjusted for age and additional adjustments for demographic factors such as education were performed where applicable.    PRE-MORBID ABILITY: Premorbid abilities were estimated within the average range based on single word reading ability and demographic factors including sex, ethnicity, education, occupation, and geographic region.  ATTENTION/EXECUTIVE FUNCTIONS: Immediate auditory attention and working memory performances were low average. Verbal abstract reasoning performance was average. Visual reasoning through pattern identification was below average. Performance on a speeded test of set-shifting/cognitive flexibility with psychomotor demands was low average with 1 error. Speeded verbal set-shifting was low average to below average. Copy of a complex figure requiring planning and organization was exceptionally low and notable for very quick and careless drawing with a dysexecutive and piecemeal approach and inattention to detail. Response inhibition performance was low average to below average. Psychomotor processing speed performances were low average to high average. Verbal processing speed scores were low average to below average.   LANGUAGE: Confrontation naming performance was average. Letter-cue verbal fluency performance was average. Semantic verbal fluency performance was low average.   VISUOSPATIAL PROCESSING: Performance on a spatial perception task requiring judgement of angled lines was high average. Copy of a sheet of simple  figures was within normal limits and without error. Copy of a complex figure was exceptionally low and notable for quick and careless drawing, inattention to detail, and a disorganized approach resulting in extra lines, missing lines, incorrect proportion of elements, misplaced elements, and distortion of the figural gestalt.  LEARNING AND MEMORY: Initial encoding of a word list over multiple learning trials was exceptionally low with minimal benefit from repetition. Delayed recall of the list was below average though she was able to recall all the words she initially encoded. Recognition of the word list was average. Initial encoding of contextualized verbal information in the form of short stories was low average and delayed retrieval was below average. Recognition of story details was average. Encoding of a sheet of simple figures and their spatial locations was exceptionally low. Delayed recall was exceptionally low though she was able to recall much of what little visual information was initially encoded.  Recognition was within normal limits and without error.  PSYCHOLOGICAL AND BEHAVIORAL: She endorsed moderate symptoms of depression and anxiety on self-report questionnaires.    PERFORMANCE VALIDITY: Performance on neuropsychological tests is dependent upon a number of factors, including sufficient engagement and motivation, to reliably establish an examinee's level of cognitive functioning.  Based upon observations made throughout the evaluation, the patient did not appear to deliberately perform in a suboptimal manner and demonstrated good frustration tolerance on cognitively challenging tasks. Scores on dedicated and imbedded indices of performance validity were in the valid range. Overall, test results are believed to accurately represent the patient's current neurocognitive status.    BEHAVIORAL OBSERVATIONS  Presented on time and was accompanied by her significant other  Alert, attentive and focused  while undergoing testing  Appearance: Well-groomed, casually dressed  Gait/Posture: No abnormalities noted  Sensorimotor: No abnormalities noted  Behavior: Cooperative, pleasant, no behavioral abnormalities noted  Speech: Soft volume, fluent, articulate, normal rate and prosody; no conversational word finding difficulty  Thought process: Generally logical, linear, and goal-directed; often needed test instructions repeated  Thought content: Logical, appropriate   Affect: Flat; good eye contact  Mood: Euthymic  Insight and Judgment: Intact  Approach to testing: Careless during drawing tasks, otherwise generally efficient and deliberate; good frustration on cognitively demanding tasks  Rapport: Easily established and maintained      Activities included in this evaluation: CPT Code #Units Time (min)   Psychiatric diagnostic interview [id number] --   Test evaluation services by professional; first hour. [id number] [phone]   Test evaluation services by professional, additional hour (+) [id number] 2    Test administration and scoring by technician, first 30 mins [id number] [phone]   Test administration and scoring by technician, additional 30 mins (+) [id number] [phone]

## 2024-08-07 NOTE — PROGRESS NOTES
Provider: CE      Tech: JP      Patient Name: Shayy Cortez      : 64      MRN: [id number]      OLVERA: 24      Age: 63      Education: 14      Ethnicity: W      Handedness: Right      Station: OP             NEUROPSYCHOLOGICAL TESTS              PREMORBID ABILITY RAW SCORE STANDARDIZED SCORE* DESCRIPTIVE RANGE*   WAIS-IV ACS Test of Premorbid Functioning (TOPF) 41 SS= 100 Average     Estimated FSIQ = 105 Average          ATTENTION AND EXECUTIVE FUNCTIONS RAW SCORE STANDARDIZED SCORE* DESCRIPTIVE RANGE**   Wechsler Adult Intelligence Scale - 4th Edition (WAIS-IV)      Digit Span Forward 9 ss= 9 Average   Digit Span Backward 4 ss= 5 Below Average   Digit Span Sequencing 7 ss= 9 Average   Digit Span Total 20 ss= 7 Low Average   Coding 39 ss= 6 Low Average   Similarities 23 ss= 9 Average   Matrix Reasoning 7 ss= 5 Below Average   Trail Making Test (Time/errors)        Part A s,r,e 24/1 TS= 58 High Average   Part B s,r,e 107/1 TS= 38 Low Average   Stroop       Word e 69 TS= 31 Below Average   Color e 52 TS= 40 Low Average   Color/Word e 23 TS= 38 Low Average   Interference e -15 TS= 34 Below Average   Yaz-Fernandez Executive Function System (D-KEFS)Verbal Fluency Test (Standard)    Category Switching: Total Correct 8 ss= 5 Below Average   Category Switching: Total Switching 7 ss= 6 Low Average          LANGUAGE RAW SCORE STANDARDIZED SCORE* DESCRIPTIVE RANGE**   Copen Naming Test s,r,e 54 TS= 45 Average   Yaz-Fernandez Executive Function System (D-KEFS) Verbal Fluency Test (Standard)     Letter Fluency Total 8-9-16 (33) ss= 9 Average   Category Fluency Total 15-12 (27) ss= 7 Low Average          VISUOSPATIAL PROCESSING RAW SCORE STANDARDIZED SCORE* DESCRIPTIVE RANGE**   Salter Judgment of Line Orientation (JOLO)s 27 %ile= 72 High Average   Matteo Complex Figure Test (RCFT) Copy 12 %ile= <=1 Exceptionally Low   Brief Visual Memory Test - Revised (BVMT-R) Copy 12   WNL          LEARNING & MEMORY RAW SCORE  STANDARDIZED SCORE* DESCRIPTIVE RANGE**   Wechsler Memory Scale-4th Edition (WMS-IV)       Logical Memory I 16 ss= 6 Low Average   Logical Memory II 7 ss= 4 Below Average   Logical Memory Recognition 24 %= 26-50 Average   Rudolph Verbal Learning Test - Revised (HVLT-R; Form 1)      Total Trials 1-3 4-7-7 (18) TS= 27 Exceptionally Low   Delayed Recall 7 TS= 35 Below Average   Retention (%) 100% TS= 55 Average   Recognition Hits 10 --     Recognition False Alarms 0 --     Recognition Discriminability 10 TS= 44 Average   Brief Visual Memory Test - Revised (BVMT-R; Form 1)      Trials 1-3 2-3-5 (10) TS= 27 Exceptionally Low   Learning 3 TS= 46 Average   Delayed Recall 4 TS= 29 Exceptionally Low   Recognition Hits 6 %ile= >16 WNL   Recognition False Alarms 0 %ile= >16 WNL   Discrimination Index 6 %ile= >16 WNL          PSYCHOLOGICAL & BEHAVIORAL SYMPTOMS RAW SCORE STANDARDIZED SCORE* DESCRIPTIVE RANGE**   Mendes Anxiety Inventory (SHERRILL) 16 --  Moderate   Mendes Depression Inventory - 2nd Edition (BDI-II) 28 --  Moderate          PERFORMANCE VALIDITY RAW SCORE   DESCRIPTIVE RANGE**   RDS 7 --  Valid Range   ACS Word Choice 50 --  Valid Range          * All standardized scores are adjusted for age. Superscripts denote additional adjustment for (s)ex, (r)ace/ethnicity, (l)anguage, and/or (e)ducation.   ** Descriptive ranges are based on American Academy of Clinical Neuropsychology (2020) consensus guidelines, or test manuals where appropriate.    WNL = within normal limits. DC = discontinued due to patient s inability to complete the test.      Standardized scores: TS = T-score; mean = 50, standard deviation =10; z = z-score; mean = 0, standard deviation = 1; ss = scaled score; mean = 10, standard deviation = 3; MAS = [place] Older Adult Normative Study age adjusted scaled score; mean = 10, standard deviation = 3; SS = standard score; mean = 100, standard deviation = 15.

## 2024-08-08 DIAGNOSIS — G25.82 STIFF PERSON SYNDROME: ICD-10-CM

## 2024-08-08 RX ORDER — BACLOFEN 10 MG/1
10 TABLET ORAL 2 TIMES DAILY
Qty: 60 TABLET | Refills: 11 | Status: SHIPPED | OUTPATIENT
Start: 2024-08-08

## 2024-08-08 NOTE — TELEPHONE ENCOUNTER
Received refill request for baclofen from [facility]; Patient was last seen in April 2024 and has follow up appointment in Aug 2024 with Dr Madison. Refilled per [facility] refill protocol.    Prudence Quiles RN

## 2024-08-19 DIAGNOSIS — M62.89 MUSCLE STIFFNESS: ICD-10-CM

## 2024-08-20 ENCOUNTER — DOCUMENTATION ONLY (OUTPATIENT)
Dept: PHARMACY | Facility: CLINIC | Age: 64
End: 2024-08-20
Payer: COMMERCIAL

## 2024-08-20 RX ORDER — CLONAZEPAM 1 MG/1
1 TABLET ORAL AT BEDTIME
Qty: 60 TABLET | Refills: 5 | Status: SHIPPED | OUTPATIENT
Start: 2024-08-20

## 2024-08-20 NOTE — PROGRESS NOTES
Skilled Nurse visit in the Patient Home to administer Privigen 25g.  No recent elevated temperature, fever, chills, productive cough, coughing for 3 weeks or longer or hemoptysis, abnormal vital signs, night sweats, chest pain. No  decrease in your appetite, unexplained weight loss or fatigue.  No other new onset medical symptoms.  Current weight 117#.  Port-a-Cath accessed, 1 attempt, blood return verified.  Pre medicated with acetaminophen 650mg PO, diphenhydramine 50mg PO, and solu-cortef 100mg IVP.  No labs drawn.  Infusion completed without complication or reaction. Pt believes therapy is helping to manage symptoms related to therapy.     Thuy Freitas, RN, BSN  [facility]  [phone]  [email or web address]

## 2024-08-20 NOTE — TELEPHONE ENCOUNTER
Patient requesting refill of their clonazepam 1 mg; Patient was last seen in April 2024 and has no follow up appointment with Dr Madison (working on rescheduling). Pended rx to Dr Madison for signature and will send electronically to the pharmacy once signed.    Prudence Quiles RN

## 2024-09-04 ENCOUNTER — LAB (OUTPATIENT)
Dept: LAB | Facility: CLINIC | Age: 64
End: 2024-09-04
Attending: PSYCHIATRY & NEUROLOGY
Payer: COMMERCIAL

## 2024-09-04 ENCOUNTER — OFFICE VISIT (OUTPATIENT)
Dept: NEUROLOGY | Facility: CLINIC | Age: 64
End: 2024-09-04
Attending: PSYCHIATRY & NEUROLOGY
Payer: COMMERCIAL

## 2024-09-04 VITALS
DIASTOLIC BLOOD PRESSURE: 75 MMHG | HEART RATE: 72 BPM | WEIGHT: 118.8 LBS | OXYGEN SATURATION: 98 % | SYSTOLIC BLOOD PRESSURE: 117 MMHG | BODY MASS INDEX: 21.34 KG/M2

## 2024-09-04 DIAGNOSIS — R26.81 GAIT INSTABILITY: ICD-10-CM

## 2024-09-04 DIAGNOSIS — G25.82 STIFF PERSON SYNDROME: Primary | ICD-10-CM

## 2024-09-04 DIAGNOSIS — G25.82 STIFF-MAN SYNDROME: ICD-10-CM

## 2024-09-04 DIAGNOSIS — G25.82 STIFF PERSON SYNDROME: ICD-10-CM

## 2024-09-04 DIAGNOSIS — Z87.39 H/O RHEUMATOID ARTHRITIS: ICD-10-CM

## 2024-09-04 LAB
Lab: NORMAL
PERFORMING LABORATORY: NORMAL
SPECIMEN STATUS: NORMAL
TEST NAME: NORMAL

## 2024-09-04 PROCEDURE — 99215 OFFICE O/P EST HI 40 MIN: CPT | Performed by: PSYCHIATRY & NEUROLOGY

## 2024-09-04 PROCEDURE — 86341 ISLET CELL ANTIBODY: CPT | Performed by: PATHOLOGY

## 2024-09-04 PROCEDURE — 99000 SPECIMEN HANDLING OFFICE-LAB: CPT | Performed by: PATHOLOGY

## 2024-09-04 PROCEDURE — 84182 PROTEIN WESTERN BLOT TEST: CPT | Performed by: PATHOLOGY

## 2024-09-04 PROCEDURE — 36415 COLL VENOUS BLD VENIPUNCTURE: CPT | Performed by: PATHOLOGY

## 2024-09-04 PROCEDURE — 86053 AQAPRN-4 ANTB FLO CYTMTRY EA: CPT | Performed by: PATHOLOGY

## 2024-09-04 PROCEDURE — G0463 HOSPITAL OUTPT CLINIC VISIT: HCPCS | Performed by: PSYCHIATRY & NEUROLOGY

## 2024-09-04 PROCEDURE — 86255 FLUORESCENT ANTIBODY SCREEN: CPT | Performed by: PATHOLOGY

## 2024-09-04 PROCEDURE — G2211 COMPLEX E/M VISIT ADD ON: HCPCS | Performed by: PSYCHIATRY & NEUROLOGY

## 2024-09-04 PROCEDURE — 82300 ASSAY OF CADMIUM: CPT | Mod: 90 | Performed by: PATHOLOGY

## 2024-09-04 PROCEDURE — 86363 MOG-IGG1 ANTB FLO CYTMTRY EA: CPT | Performed by: PATHOLOGY

## 2024-09-04 RX ORDER — CLONAZEPAM 0.5 MG/1
0.5 TABLET ORAL DAILY
Qty: 60 TABLET | Refills: 5 | Status: SHIPPED | OUTPATIENT
Start: 2024-09-04

## 2024-09-04 ASSESSMENT — PAIN SCALES - GENERAL: PAINLEVEL: MODERATE PAIN (4)

## 2024-09-04 NOTE — NURSING NOTE
Chief Complaint   Patient presents with    MS    RECHECK     MS follow up      Vitals were taken and medications were reconciled.   Niles Jamil, EMT  3:55 PM

## 2024-09-04 NOTE — PROGRESS NOTES
Date of Service: 9/4/2024    [facility] Neurology   MS Clinic Evaluation    Subjective: 64 y/o woman with melanoma of the abdomen with metastasis to lymph nodes s/p adjuvant chemotherapy and subsequently nivolumab x 1 year who presents in follow up with neurology after admission for progressive neurologic symptoms.    She continues to struggle with dizziness.  This is associated with some light events.  It can still occur while sitting, but is mainly noticeable when she is up and moving around.  She never feels as though she is going to pass out.  At her last visit I recommended that she drink at least 1 electrolyte beverage per day.  She tried this for a few days and did not notice beneficial elected to discontinue.  She notes that she does not get any nausea with the dizziness.  It is different from the intense vertigo that she experienced at the start of her disease onset.    Her gait is very slow.  This is very frustrating to her.  It limits her activity during the daytime.    Notes that she has a slight tremor.    She was working with physical therapy in the past, but did not find it particularly beneficial.  She notes that her current time it is challenging for her to attend physical therapy due to difficulty with transportation.    She continues to receive IVIG.  It seems that she is tolerating this well.    She continues on prednisone 5 mg daily, but is due for follow-up with rheumatology.  They are considering tapering down the dose and resuming methotrexate, which I support.    She continues to take baclofen, citalopram, and clonazepam.  The increased dose of citalopram was helpful.    He has questions about her disease and results of the neuropsychologic evaluation.    She was approved for disability.    No Known Allergies    Current Outpatient Medications   Medication Sig Dispense Refill    acetaminophen (TYLENOL) 500 MG tablet Take 500-1,000 mg by mouth every 6 hours as needed for mild pain      baclofen  (LIORESAL) 10 MG tablet Take 1 tablet (10 mg) by mouth 2 times daily 60 tablet 11    calcium carbonate 500 mg, elemental, (OSCAL) 500 MG tablet Take 1 tablet (500 mg) by mouth 2 times daily (with meals) 120 tablet 0    citalopram (CELEXA) 20 MG tablet Take 1.5 tablets (30 mg) by mouth daily 45 tablet 11    clonazePAM (KLONOPIN) 0.5 MG tablet Take 1 tablet (0.5 mg) by mouth daily 60 tablet 5    clonazePAM (KLONOPIN) 0.5 MG tablet Take 1 tablet (0.5 mg) by mouth daily as needed for anxiety (Tremors) 30 tablet 5    clonazePAM (KLONOPIN) 1 MG tablet Take 1 tablet (1 mg) by mouth at bedtime 60 tablet 5    folic acid (FOLVITE) 1 MG tablet Take 1 tablet (1,000 mcg) by mouth daily 30 tablet 0    immune globulin - sucrose free 10 % injection Inject 25.7 g into the vein every 28 (twenty-eight) days      levothyroxine (SYNTHROID/LEVOTHROID) 50 MCG tablet Take 1 tablet (50 mcg) by mouth daily 30 tablet 0    pantoprazole (PROTONIX) 40 MG EC tablet Take 1 tablet (40 mg) by mouth every morning (before breakfast) 30 tablet 0    polyethylene glycol (MIRALAX) 17 GM/Dose powder Take 17 g by mouth daily 510 g 0    potassium chloride ER (MICRO-K) 10 MEQ CR capsule Take 1 capsule (10 mEq) by mouth 2 times daily 60 capsule 0    predniSONE (DELTASONE) 5 MG tablet Take 1 tablet (5 mg) by mouth every morning 30 tablet 5    Vitamin D3 (CHOLECALCIFEROL) 25 mcg (1000 units) tablet Take 2 tablets (50 mcg) by mouth daily 60 tablet 0    blood glucose (NO BRAND SPECIFIED) lancets standard Use to test blood sugar 4 times daily or as directed. [place] Contour Microlet lancets (Patient not taking: Reported on 9/4/2024) 120 Lancet 0    blood glucose (NO BRAND SPECIFIED) test strip To use to test glucose level in the blood Use to test blood sugar  4 times daily as directed. To accompany glucose monitor brands per insurance coverage. (Patient not taking: Reported on 9/4/2024) 100 strip 0    citalopram (CELEXA) 10 MG tablet Take 1 tablet (10 mg) by mouth  daily (Patient not taking: Reported on 4/26/2024) 30 tablet 11    citalopram (CELEXA) 20 MG tablet Take 1 tablet (20 mg) by mouth daily (Patient not taking: Reported on 4/26/2024) 30 tablet 11     Current Facility-Administered Medications   Medication Dose Route Frequency Provider Last Rate Last Admin    botulinum toxin type A (BOTOX) 100 units injection 300-400 Units  300-400 Units Intramuscular Q90 Days Escobar Felix MD   400 Units at 07/19/24 1134    botulinum toxin type A (BOTOX) 100 units injection 400 Units  400 Units Intramuscular Q90 Days             Past medical, surgical, social and family history was personally reviewed. Pertinent details noted above.     Physical Examination:   /75 (BP Location: Left arm, Patient Position: Sitting, Cuff Size: Adult Regular)   Pulse 72   Wt 53.9 kg (118 lb 12.8 oz)   SpO2 98%   BMI 21.34 kg/m      General: no acute distress  Cranial nerves:   VFFC  PERRL w/no RAPD  EOM full w/no ALBERTO, vertical gaze evoked nystagmus  Face symmetric  Hearing intact  No dysarthria   Motor:   Tone is normal   Bulk is normal   Minimal tremor     R L  Deltoid  5 5  Biceps  5 5  Triceps 5 5  Wrist ext 5 5  Finger ext 5 5  Finger abd 5 5    Hip flexion 5 5  Knee flexion 5 5  Knee ext 5 5  Ankle d/f 5 5    Reflexes: 3+ and symmetric throughout, persistent ankle clonus   Sensory: vib sensation is mildly reduced in the toes, jps intact  Romberg is absent  Coordination: no ataxia or dysmetria   Gait: stiff slow cautious gait - left leg appears more stiff, tandem moderately impaired, able to stand on each foot x 10 seconds    Tests/Imaging:   CSF gad65 and dppx neg  Flow w b cells  Cytology negative for malignant cells, +lymphocytosis  Protein 85  32 wbc, lymphocytic  Study 12/14/23 - 2 ocb,    Nirav neg from serum   [place] serum test +gad65 0.07    Elevated cadmium 1.6 (ULN 0.6)    Cr has fluctuated 1.02 start of year   Lately 2.0-1.7    MRI Brain  1/2024-personal review, right  cerebellar dva, otherwise unremarkable study aside from some atrophy and mild ischemic changes     MRI Cervical spine   1/2024 - notable degenerative changes but no evidence of myelomalacia    MRI Thoracic spine   N/d    Assessment: 63-year-old woman with a history of rheumatoid arthritis, metastatic melanoma status post nivolumab who acute onset of progressive neurologic symptoms in the setting of CSF positive for inflammatory changes.  She responded positively to IVIG and seems to have sustained a positive response.    Strengths was provided that there are aspects of her clinical examination that appear better today.  Not have a Romberg.  She is able to balance on 1 foot.  However I do notice that she has sustained ankle clonus on examination, which in the past this would extinguish.  This may be suggestive of myelopathy.  I recommended that she undergo updated imaging.    We discussed the possible etiologies of her dizziness.  The dizziness can occur because of cervical spondylosis.  It could also be related to the acute event that she experienced at the end of last year.  Physical therapy is typically helpful for both of these conditions, though this poses a logistical challenge for her at present time.    Results of neuropsychologic evaluation were discussed.  She does have some difficulty with executive function and therefore fulfills a diagnosis of mild cognitive impairment.    Finally, I voiced concern about the elevated creatinine.  I have recommended that she visit with her primary care to discuss testing.  This could be secondary to her IVIG, though she has gotten substantial clinical benefit from IVIG and I am not inclined to discontinue it until other possible sources of elevated creatinine are ruled out.    Plan:   -Continue monthly IVIG  - Continue  citalopram 30 mg daily, clonazepam, baclofen  - MRI brain and cervical spine  - Follow-up in 6 months    Note was completed with the assistance of [employer]  Fluency software which can often result in accidental word substitutions.   The longitudinal plan of care for the diagnosis(es)/condition(s) as documented were addressed during this visit. Due to the added complexity in care, I will continue to support Shayy in the subsequent management and with ongoing continuity of care.    A total of 40 minutes on the date of service were spent in the care of this patient.   Kandy Madison MD on 9/4/2024 at 4:18 PM

## 2024-09-04 NOTE — PATIENT INSTRUCTIONS
You are stronger   But the muscles in your legs are tighter     I recommend we update MRI of the brain and cervical spine   Pending these results I might recommend physical therapy     Blood work today     Continue ivig   However, IVIG is filtered through the kidneys -> please see primary care to undergo evaluation for your elevated creatinine     Continue your current medications - though it would be great if you could get off prednisone (I defer to your rheumatologist)     Make sure to drink one electrolyte drink daily   If no benefit after two weeks, then you can stop     Follow up in 6 months

## 2024-09-04 NOTE — LETTER
9/4/2024       RE: Shayy Cortez  [address]  [facility] [address]     Dear Colleague,    Thank you for referring your patient, Shayy Cortez, to the [facility] [place] at [facility]. Please see a copy of my visit note below.    Date of Service: 9/4/2024    [facility] Neurology   MS Clinic Evaluation    Subjective: 62 y/o woman with melanoma of the abdomen with metastasis to lymph nodes s/p adjuvant chemotherapy and subsequently nivolumab x 1 year who presents in follow up with neurology after admission for progressive neurologic symptoms.    She continues to struggle with dizziness.  This is associated with some light events.  It can still occur while sitting, but is mainly noticeable when she is up and moving around.  She never feels as though she is going to pass out.  At her last visit I recommended that she drink at least 1 electrolyte beverage per day.  She tried this for a few days and did not notice beneficial elected to discontinue.  She notes that she does not get any nausea with the dizziness.  It is different from the intense vertigo that she experienced at the start of her disease onset.    Her gait is very slow.  This is very frustrating to her.  It limits her activity during the daytime.    Notes that she has a slight tremor.    She was working with physical therapy in the past, but did not find it particularly beneficial.  She notes that her current time it is challenging for her to attend physical therapy due to difficulty with transportation.    She continues to receive IVIG.  It seems that she is tolerating this well.    She continues on prednisone 5 mg daily, but is due for follow-up with rheumatology.  They are considering tapering down the dose and resuming methotrexate, which I support.    She continues to take baclofen, citalopram, and clonazepam.  The increased dose of citalopram was helpful.    He has  questions about her disease and results of the neuropsychologic evaluation.    She was approved for disability.    No Known Allergies    Current Outpatient Medications   Medication Sig Dispense Refill     acetaminophen (TYLENOL) 500 MG tablet Take 500-1,000 mg by mouth every 6 hours as needed for mild pain       baclofen (LIORESAL) 10 MG tablet Take 1 tablet (10 mg) by mouth 2 times daily 60 tablet 11     calcium carbonate 500 mg, elemental, (OSCAL) 500 MG tablet Take 1 tablet (500 mg) by mouth 2 times daily (with meals) 120 tablet 0     citalopram (CELEXA) 20 MG tablet Take 1.5 tablets (30 mg) by mouth daily 45 tablet 11     clonazePAM (KLONOPIN) 0.5 MG tablet Take 1 tablet (0.5 mg) by mouth daily 60 tablet 5     clonazePAM (KLONOPIN) 0.5 MG tablet Take 1 tablet (0.5 mg) by mouth daily as needed for anxiety (Tremors) 30 tablet 5     clonazePAM (KLONOPIN) 1 MG tablet Take 1 tablet (1 mg) by mouth at bedtime 60 tablet 5     folic acid (FOLVITE) 1 MG tablet Take 1 tablet (1,000 mcg) by mouth daily 30 tablet 0     immune globulin - sucrose free 10 % injection Inject 25.7 g into the vein every 28 (twenty-eight) days       levothyroxine (SYNTHROID/LEVOTHROID) 50 MCG tablet Take 1 tablet (50 mcg) by mouth daily 30 tablet 0     pantoprazole (PROTONIX) 40 MG EC tablet Take 1 tablet (40 mg) by mouth every morning (before breakfast) 30 tablet 0     polyethylene glycol (MIRALAX) 17 GM/Dose powder Take 17 g by mouth daily 510 g 0     potassium chloride ER (MICRO-K) 10 MEQ CR capsule Take 1 capsule (10 mEq) by mouth 2 times daily 60 capsule 0     predniSONE (DELTASONE) 5 MG tablet Take 1 tablet (5 mg) by mouth every morning 30 tablet 5     Vitamin D3 (CHOLECALCIFEROL) 25 mcg (1000 units) tablet Take 2 tablets (50 mcg) by mouth daily 60 tablet 0     blood glucose (NO BRAND SPECIFIED) lancets standard Use to test blood sugar 4 times daily or as directed. [place] Contour Microlet lancets (Patient not taking: Reported on 9/4/2024) 120  Lancet 0     blood glucose (NO BRAND SPECIFIED) test strip To use to test glucose level in the blood Use to test blood sugar  4 times daily as directed. To accompany glucose monitor brands per insurance coverage. (Patient not taking: Reported on 9/4/2024) 100 strip 0     citalopram (CELEXA) 10 MG tablet Take 1 tablet (10 mg) by mouth daily (Patient not taking: Reported on 4/26/2024) 30 tablet 11     citalopram (CELEXA) 20 MG tablet Take 1 tablet (20 mg) by mouth daily (Patient not taking: Reported on 4/26/2024) 30 tablet 11     Current Facility-Administered Medications   Medication Dose Route Frequency Provider Last Rate Last Admin     botulinum toxin type A (BOTOX) 100 units injection 300-400 Units  300-400 Units Intramuscular Q90 Days Escobar Felix MD   400 Units at 07/19/24 1134     botulinum toxin type A (BOTOX) 100 units injection 400 Units  400 Units Intramuscular Q90 Days             Past medical, surgical, social and family history was personally reviewed. Pertinent details noted above.     Physical Examination:   /75 (BP Location: Left arm, Patient Position: Sitting, Cuff Size: Adult Regular)   Pulse 72   Wt 53.9 kg (118 lb 12.8 oz)   SpO2 98%   BMI 21.34 kg/m      General: no acute distress  Cranial nerves:   VFFC  PERRL w/no RAPD  EOM full w/no ALBERTO, vertical gaze evoked nystagmus  Face symmetric  Hearing intact  No dysarthria   Motor:   Tone is normal   Bulk is normal   Minimal tremor     R L  Deltoid  5 5  Biceps  5 5  Triceps 5 5  Wrist ext 5 5  Finger ext 5 5  Finger abd 5 5    Hip flexion 5 5  Knee flexion 5 5  Knee ext 5 5  Ankle d/f 5 5    Reflexes: 3+ and symmetric throughout, persistent ankle clonus   Sensory: vib sensation is mildly reduced in the toes, jps intact  Romberg is absent  Coordination: no ataxia or dysmetria   Gait: stiff slow cautious gait - left leg appears more stiff, tandem moderately impaired, able to stand on each foot x 10 seconds    Tests/Imaging:   CSF  gad65 and dppx neg  Flow w b cells  Cytology negative for malignant cells, +lymphocytosis  Protein 85  32 wbc, lymphocytic  Study 12/14/23 - 2 ocb,    Nirav neg from serum   [place] serum test +gad65 0.07    Elevated cadmium 1.6 (ULN 0.6)    Cr has fluctuated 1.02 start of year   Lately 2.0-1.7    MRI Brain  1/2024-personal review, right cerebellar dva, otherwise unremarkable study aside from some atrophy and mild ischemic changes     MRI Cervical spine   1/2024 - notable degenerative changes but no evidence of myelomalacia    MRI Thoracic spine   N/d    Assessment: 63-year-old woman with a history of rheumatoid arthritis, metastatic melanoma status post nivolumab who acute onset of progressive neurologic symptoms in the setting of CSF positive for inflammatory changes.  She responded positively to IVIG and seems to have sustained a positive response.    Strengths was provided that there are aspects of her clinical examination that appear better today.  Not have a Romberg.  She is able to balance on 1 foot.  However I do notice that she has sustained ankle clonus on examination, which in the past this would extinguish.  This may be suggestive of myelopathy.  I recommended that she undergo updated imaging.    We discussed the possible etiologies of her dizziness.  The dizziness can occur because of cervical spondylosis.  It could also be related to the acute event that she experienced at the end of last year.  Physical therapy is typically helpful for both of these conditions, though this poses a logistical challenge for her at present time.    Results of neuropsychologic evaluation were discussed.  She does have some difficulty with executive function and therefore fulfills a diagnosis of mild cognitive impairment.    Finally, I voiced concern about the elevated creatinine.  I have recommended that she visit with her primary care to discuss testing.  This could be secondary to her IVIG, though she has gotten substantial  clinical benefit from IVIG and I am not inclined to discontinue it until other possible sources of elevated creatinine are ruled out.    Plan:   -Continue monthly IVIG  - Continue  citalopram 30 mg daily, clonazepam, baclofen  - MRI brain and cervical spine  - Follow-up in 6 months    Note was completed with the assistance of Dragon Fluency software which can often result in accidental word substitutions.   The longitudinal plan of care for the diagnosis(es)/condition(s) as documented were addressed during this visit. Due to the added complexity in care, I will continue to support Shayy in the subsequent management and with ongoing continuity of care.    A total of 40 minutes on the date of service were spent in the care of this patient.   Kandy Madison MD on 9/4/2024 at 4:18 PM            Again, thank you for allowing me to participate in the care of your patient.      Sincerely,    Kandy Madison MD

## 2024-09-05 ENCOUNTER — ENROLLMENT (OUTPATIENT)
Dept: HOME HEALTH SERVICES | Facility: HOME HEALTH | Age: 64
End: 2024-09-05
Payer: COMMERCIAL

## 2024-09-07 LAB — CADMIUM BLD-MCNC: 2.7 UG/L

## 2024-09-13 LAB — MAYO MISC RESULT: ABNORMAL

## 2024-09-17 ENCOUNTER — DOCUMENTATION ONLY (OUTPATIENT)
Dept: PHARMACY | Facility: CLINIC | Age: 64
End: 2024-09-17
Payer: COMMERCIAL

## 2024-09-17 NOTE — PROGRESS NOTES
Skilled Nurse visit in the Patient Home to administer Privigen 25g.  No recent elevated temperature, fever, chills, productive cough, coughing for 3 weeks or longer or hemoptysis, abnormal vital signs, night sweats, chest pain. No  decrease in your appetite, unexplained weight loss or fatigue.  No other new onset medical symptoms.  Current weight 118#.  Port-a-Cath accessed, 1 attempt, blood return verified.  Pre medicated with acetaminophen 650mg PO, diphenhydramine 50mg PO, and solu-cortef 100mg IVP.  No labs drawn.  Infusion completed without complication or reaction. Pt believes therapy is helping to manage symptoms related to therapy.     Thuy Freitas, RN, BSN  [facility]  [phone]  [email or web address]

## 2024-10-11 ENCOUNTER — HOME INFUSION (OUTPATIENT)
Dept: HOME HEALTH SERVICES | Facility: HOME HEALTH | Age: 64
End: 2024-10-11
Payer: COMMERCIAL

## 2024-10-11 ENCOUNTER — OFFICE VISIT (OUTPATIENT)
Dept: PHYSICAL MEDICINE AND REHAB | Facility: CLINIC | Age: 64
End: 2024-10-11
Payer: COMMERCIAL

## 2024-10-11 VITALS — BODY MASS INDEX: 20.91 KG/M2 | WEIGHT: 117.99 LBS | HEIGHT: 63 IN

## 2024-10-11 DIAGNOSIS — R26.9 ABNORMAL GAIT: ICD-10-CM

## 2024-10-11 DIAGNOSIS — M62.838 SPASM OF MUSCLE: ICD-10-CM

## 2024-10-11 DIAGNOSIS — G25.82 STIFF-MAN SYNDROME: Primary | ICD-10-CM

## 2024-10-11 PROCEDURE — 99213 OFFICE O/P EST LOW 20 MIN: CPT | Mod: 25 | Performed by: PHYSICAL MEDICINE & REHABILITATION

## 2024-10-11 PROCEDURE — 95874 GUIDE NERV DESTR NEEDLE EMG: CPT | Performed by: PHYSICAL MEDICINE & REHABILITATION

## 2024-10-11 PROCEDURE — 64643 CHEMODENERV 1 EXTREM 1-4 EA: CPT | Performed by: PHYSICAL MEDICINE & REHABILITATION

## 2024-10-11 PROCEDURE — 64642 CHEMODENERV 1 EXTREMITY 1-4: CPT | Performed by: PHYSICAL MEDICINE & REHABILITATION

## 2024-10-11 RX ORDER — HUMAN IMMUNOGLOBULIN G 20 G/200ML
20 LIQUID INTRAVENOUS
Qty: 800 ML | Refills: 0 | Status: DISPENSED | OUTPATIENT
Start: 2024-10-23 | End: 2025-02-14

## 2024-10-11 RX ORDER — SODIUM CHLORIDE 9 MG/ML
500 INJECTION, SOLUTION INTRAVENOUS PRN
Qty: 999999 ML | Refills: 0 | Status: ACTIVE | OUTPATIENT
Start: 2024-10-23 | End: 2025-02-14

## 2024-10-11 RX ORDER — EPINEPHRINE 0.3 MG/.3ML
0.3 INJECTION SUBCUTANEOUS PRN
Qty: 999999 ML | Refills: 0 | Status: ACTIVE | OUTPATIENT
Start: 2024-10-23 | End: 2025-02-14

## 2024-10-11 RX ORDER — ACETAMINOPHEN 325 MG/1
650 TABLET ORAL
Qty: 8 TABLET | Refills: 0 | Status: DISPENSED | OUTPATIENT
Start: 2024-10-23 | End: 2025-02-14

## 2024-10-11 RX ORDER — HUMAN IMMUNOGLOBULIN G 5 G/50ML
5 LIQUID INTRAVENOUS
Qty: 200 ML | Refills: 0 | Status: DISPENSED | OUTPATIENT
Start: 2024-10-23 | End: 2025-02-14

## 2024-10-11 RX ORDER — DIPHENHYDRAMINE HCL 25 MG
50 CAPSULE ORAL
Qty: 8 CAPSULE | Refills: 0 | Status: DISPENSED | OUTPATIENT
Start: 2024-10-23 | End: 2025-02-14

## 2024-10-11 RX ORDER — DIPHENHYDRAMINE HYDROCHLORIDE 50 MG/ML
50 INJECTION INTRAMUSCULAR; INTRAVENOUS PRN
Qty: 99999 ML | Refills: 0 | Status: ACTIVE | OUTPATIENT
Start: 2024-10-23 | End: 2025-02-14

## 2024-10-11 NOTE — LETTER
10/11/2024       RE: Shayy Cortez  [address]  [facility] [address]     Dear Colleague,    Thank you for referring your patient, Shayy Cortez, to the [facility] at [facility]. Please see a copy of my visit note below.    CC: Gait disturbance, muscle pain and dizziness in the setting of stiff person syndrome and rheumatoid arthritis.     Last seen for Botox on 7/19/2024.  Since her last visit, she has noticed improvement in her walking following Botox. Her significant other concurred with that assessment as well.  She has not noticed any downsides.  She is following up with her neurologist for her neck issues.     63 year old female with PMH of cutaneous melanoma of the abdomen s/p one year treatment of nivolumab with development of progressive tremor, transient vertigo, abnormal eye movements and gait instability despite cessation of Nivolumab that started in August 2023. 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 Pt completed 5 day course of solumedrol and IVIG w/ improvement in tone, hyperreflexia, and tremor noted.  [facility] to 1/26/2024.  She was discharged home.  In addition to managing her neurologic status, she also has required medical mgmt of her urinary retention which is improving, constipation improving, blood sugars management due to high dose steroids and long term taper, pain, and mental health.     Her significant other who accompanies her notes that he has noticed continued problems with memory, problem-solving and thinking in general.  Even though she is better compared to when she was at the [facility], her persistent issues frustrate her.  She is able to walk without walker but feels unsteady and shuffling.  She has seen her ophthalmologist and currently is without additional interventions.  She has followed up with her neurologist and received the clearance to  undergo Botox injections.        Current Outpatient Medications   Medication Sig Dispense Refill     acetaminophen (TYLENOL) 500 MG tablet Take 500-1,000 mg by mouth every 6 hours as needed for mild pain       baclofen (LIORESAL) 10 MG tablet Take 1 tablet (10 mg) by mouth 2 times daily 60 tablet 11     calcium carbonate 500 mg, elemental, (OSCAL) 500 MG tablet Take 1 tablet (500 mg) by mouth 2 times daily (with meals) 120 tablet 0     citalopram (CELEXA) 20 MG tablet Take 1.5 tablets (30 mg) by mouth daily 45 tablet 11     clonazePAM (KLONOPIN) 0.5 MG tablet Take 1 tablet (0.5 mg) by mouth daily. 60 tablet 5     clonazePAM (KLONOPIN) 0.5 MG tablet Take 1 tablet (0.5 mg) by mouth daily as needed for anxiety (Tremors) 30 tablet 5     clonazePAM (KLONOPIN) 1 MG tablet Take 1 tablet (1 mg) by mouth at bedtime 60 tablet 5     folic acid (FOLVITE) 1 MG tablet Take 1 tablet (1,000 mcg) by mouth daily 30 tablet 0     immune globulin - sucrose free 10 % injection Inject 25.7 g into the vein every 28 (twenty-eight) days       levothyroxine (SYNTHROID/LEVOTHROID) 50 MCG tablet Take 1 tablet (50 mcg) by mouth daily 30 tablet 0     pantoprazole (PROTONIX) 40 MG EC tablet Take 1 tablet (40 mg) by mouth every morning (before breakfast) 30 tablet 0     polyethylene glycol (MIRALAX) 17 GM/Dose powder Take 17 g by mouth daily 510 g 0     potassium chloride ER (MICRO-K) 10 MEQ CR capsule Take 1 capsule (10 mEq) by mouth 2 times daily 60 capsule 0     predniSONE (DELTASONE) 5 MG tablet Take 1 tablet (5 mg) by mouth every morning 30 tablet 5     Vitamin D3 (CHOLECALCIFEROL) 25 mcg (1000 units) tablet Take 2 tablets (50 mcg) by mouth daily 60 tablet 0        There were no vitals taken for this visit.      On examination, patient is alert and cooperative.  Her vitals are stable.  She is afebrile.     Speech is fluent.  Affect is pleasant.     HEENT is negative.  Extraocular movements are intact.  Face is symmetric.  Tongue is midline.   Neck is supple.     She is able to move her upper extremities functionally.  She is able to carry out straight leg raising test.  She is able to do Satish's.     Neurologically she has no sensory deficits.  She has good strength.  She has increased tone with clonus at the ankles.  Tone is increased in the hamstrings bilaterally and the gastrocnemius bilaterally.  Antony's positive.  Plantars are positive.     No focal areas of tenderness were elicited at either the cervical/lumbar/sacroiliac region.  Romberg is negative.     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  Her current hypertonia and stiffness interferes with her gait and has improved with neurotoxin focally at the hamstrings and the gastrocnemius.  I would increase her dose to 400 units.  Anticipate onset of action within 1 to 3 days, peak in 2 weeks and a duration of 12 weeks.  If she gets good relief from this, this could be continued on a regular basis.      This was reviewed at length with the patient and her significant other.  All questions were answered to their satisfaction.  20 minutes spent for this visit, exclusive of the procedure, greater than 50% was for counseling above-mentioned issues.        PROCEDURE NOTE: With her informed consent, after explaining the benefits and risks of the procedure, using preservative-free normal saline for dilution, 1 cc per 100 units, 400 units of Botox were injected with EMG guidance as follows 100 units were injected into the hamstrings bilaterally.  100 units were injected into the medial and lateral gastrocnemius bilaterally.  She tolerated the procedure well.     Thank you much for allow me to assist in her care.     Escobar Felix MD      Again, thank you for allowing me to participate  in the care of your patient.      Sincerely,    Escobar Felix MD

## 2024-10-11 NOTE — PROGRESS NOTES
CC: Gait disturbance, muscle pain and dizziness in the setting of stiff person syndrome and rheumatoid arthritis.     Last seen for Botox on 7/19/2024.  Since her last visit, she has noticed improvement in her walking following Botox. Her significant other concurred with that assessment as well.  She has not noticed any downsides.  She is following up with her neurologist for her neck issues.     63 year old female with PMH of cutaneous melanoma of the abdomen s/p one year treatment of nivolumab with development of progressive tremor, transient vertigo, abnormal eye movements and gait instability despite cessation of Nivolumab that started in August 2023. 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 Pt completed 5 day course of solumedrol and IVIG w/ improvement in tone, hyperreflexia, and tremor noted.  She [facility] from [facility]24 to 1/26/2024.  She was discharged home.  In addition to managing her neurologic status, she also has required medical mgmt of her urinary retention which is improving, constipation improving, blood sugars management due to high dose steroids and long term taper, pain, and mental health.     Her significant other who accompanies her notes that he has noticed continued problems with memory, problem-solving and thinking in general.  Even though she is better compared to when she was at the ARU, her persistent issues frustrate her.  She is able to walk without walker but feels unsteady and shuffling.  She has seen her ophthalmologist and currently is without additional interventions.  She has followed up with her neurologist and received the clearance to undergo Botox injections.        Current Outpatient Medications   Medication Sig Dispense Refill    acetaminophen (TYLENOL) 500 MG tablet Take 500-1,000 mg by mouth every 6 hours as needed for mild pain      baclofen (LIORESAL) 10 MG tablet Take 1 tablet (10 mg) by mouth 2 times daily 60 tablet 11     calcium carbonate 500 mg, elemental, (OSCAL) 500 MG tablet Take 1 tablet (500 mg) by mouth 2 times daily (with meals) 120 tablet 0    citalopram (CELEXA) 20 MG tablet Take 1.5 tablets (30 mg) by mouth daily 45 tablet 11    clonazePAM (KLONOPIN) 0.5 MG tablet Take 1 tablet (0.5 mg) by mouth daily. 60 tablet 5    clonazePAM (KLONOPIN) 0.5 MG tablet Take 1 tablet (0.5 mg) by mouth daily as needed for anxiety (Tremors) 30 tablet 5    clonazePAM (KLONOPIN) 1 MG tablet Take 1 tablet (1 mg) by mouth at bedtime 60 tablet 5    folic acid (FOLVITE) 1 MG tablet Take 1 tablet (1,000 mcg) by mouth daily 30 tablet 0    immune globulin - sucrose free 10 % injection Inject 25.7 g into the vein every 28 (twenty-eight) days      levothyroxine (SYNTHROID/LEVOTHROID) 50 MCG tablet Take 1 tablet (50 mcg) by mouth daily 30 tablet 0    pantoprazole (PROTONIX) 40 MG EC tablet Take 1 tablet (40 mg) by mouth every morning (before breakfast) 30 tablet 0    polyethylene glycol (MIRALAX) 17 GM/Dose powder Take 17 g by mouth daily 510 g 0    potassium chloride ER (MICRO-K) 10 MEQ CR capsule Take 1 capsule (10 mEq) by mouth 2 times daily 60 capsule 0    predniSONE (DELTASONE) 5 MG tablet Take 1 tablet (5 mg) by mouth every morning 30 tablet 5    Vitamin D3 (CHOLECALCIFEROL) 25 mcg (1000 units) tablet Take 2 tablets (50 mcg) by mouth daily 60 tablet 0        There were no vitals taken for this visit.      On examination, patient is alert and cooperative.  Her vitals are stable.  She is afebrile.     Speech is fluent.  Affect is pleasant.     HEENT is negative.  Extraocular movements are intact.  Face is symmetric.  Tongue is midline.  Neck is supple.     She is able to move her upper extremities functionally.  She is able to carry out straight leg raising test.  She is able to do Satish's.     Neurologically she has no sensory deficits.  She has good strength.  She has increased tone with clonus at the ankles.  Tone is increased in the hamstrings  bilaterally and the gastrocnemius bilaterally.  Antony's positive.  Plantars are positive.     No focal areas of tenderness were elicited at either the cervical/lumbar/sacroiliac region.  Romberg is negative.     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  Her current hypertonia and stiffness interferes with her gait and has improved with neurotoxin focally at the hamstrings and the gastrocnemius.  I would increase her dose to 400 units.  Anticipate onset of action within 1 to 3 days, peak in 2 weeks and a duration of 12 weeks.  If she gets good relief from this, this could be continued on a regular basis.      This was reviewed at length with the patient and her significant other.  All questions were answered to their satisfaction.  20 minutes spent for this visit, exclusive of the procedure, greater than 50% was for counseling above-mentioned issues.        PROCEDURE NOTE: With her informed consent, after explaining the benefits and risks of the procedure, using preservative-free normal saline for dilution, 1 cc per 100 units, 400 units of Botox were injected with EMG guidance as follows 100 units were injected into the hamstrings bilaterally.  100 units were injected into the medial and lateral gastrocnemius bilaterally.  She tolerated the procedure well.     Thank you much for allow me to assist in her care.     Escobar Felix MD

## 2024-10-15 ENCOUNTER — DOCUMENTATION ONLY (OUTPATIENT)
Dept: HOME HEALTH SERVICES | Facility: HOME HEALTH | Age: 64
End: 2024-10-15
Payer: COMMERCIAL

## 2024-10-15 NOTE — PROGRESS NOTES
Skilled Nurse visit in the Patient Home to administer Privigen 25g.  No recent elevated temperature, fever, chills, productive cough, coughing for 3 weeks or longer or hemoptysis, abnormal vital signs, night sweats, chest pain. No  decrease in your appetite, unexplained weight loss or fatigue.  No other new onset medical symptoms.  Current weight 120#.  Port-a-Cath accessed, 1 attempt, blood return verified.  Pre medicated with acetaminophen 650mg PO, diphenhydramine 50mg PO, and solu-cortef 100mg IVP.  No labs drawn.  Infusion completed without complication or reaction. Pt believes therapy is helping to manage symptoms related to therapy.     Thuy Freitas, RN, BSN  [facility]  [phone]  [email or web address]

## 2024-10-28 ENCOUNTER — HOME INFUSION (OUTPATIENT)
Dept: HOME HEALTH SERVICES | Facility: HOME HEALTH | Age: 64
End: 2024-10-28
Payer: COMMERCIAL

## 2024-11-06 ENCOUNTER — HOME INFUSION BILLING (OUTPATIENT)
Dept: HOME HEALTH SERVICES | Facility: HOME HEALTH | Age: 64
End: 2024-11-06
Payer: COMMERCIAL

## 2024-11-06 PROCEDURE — A4208 3 CC STERILE SYRINGE&NEEDLE: HCPCS

## 2024-11-06 PROCEDURE — A4215 STERILE NEEDLE: HCPCS

## 2024-11-06 PROCEDURE — E1399 DURABLE MEDICAL EQUIPMENT MI: HCPCS

## 2024-11-12 ENCOUNTER — HOME CARE VISIT (OUTPATIENT)
Dept: HOME HEALTH SERVICES | Facility: HOME HEALTH | Age: 64
End: 2024-11-12
Payer: COMMERCIAL

## 2024-11-12 VITALS
RESPIRATION RATE: 16 BRPM | BODY MASS INDEX: 21.56 KG/M2 | SYSTOLIC BLOOD PRESSURE: 112 MMHG | WEIGHT: 120 LBS | OXYGEN SATURATION: 98 % | DIASTOLIC BLOOD PRESSURE: 65 MMHG | TEMPERATURE: 97.7 F | HEART RATE: 72 BPM

## 2024-11-12 PROCEDURE — S9338 HIT IMMUNOTHERAPY DIEM: HCPCS

## 2024-11-12 PROCEDURE — E0781 EXTERNAL AMBULATORY INFUS PU: HCPCS | Mod: RR

## 2024-11-12 NOTE — PROGRESS NOTES
"Infusion Nursing Note:  Skilled nurse visit today for Privigen administration  for Shayy Cortez.   present during visit today: Not Applicable.    Pre-infusion Checklist:   Pre-infusion Checklist    Have you had any delayed reaction since last infusion?   No    Have you recently had an elevated temperature, fever, chills productive cough, coughing for 3 weeks or longer or hemoptysis, abnormal vital signs, night sweats, chest pain, or have you noticed a decrease in your appetite, or noted unexplained weight loss or fatigue?   No    Do you have any open wounds or new incisions?  No    Do you have any recent or upcoming hospitalizations, surgeries, or dental procedures?   No    Do you currently have or recently have had any signs of illness or infection or are you on any antibiotics?   No    Have you had any new, sudden , or worsening abdominal pain?   No    Have you or anyone in your household received a live vaccination in the past 4 weeks?   No    Have you recently been diagnosed with any new nervous system diseases or cancer diagnosis? (i.e., Multiple Sclerosis, Guillain [place], sezures, neurological changes) Are you receiving any form of radiation or chemotherapy?   No    Are you pregnant or breastfeeding, or do you have plans of pregnancy in the future?   No    Have you been having any signs of worsening depression or suicidal ideation?  No    Have there been any other new onset medical symptoms?  No    Did the patient answer \"YES\" to any of the questions above?  No    Will the patient receive a medication that has an order for infusion reaction management?  Yes, and all drugs and supplies are available and none have .    If ordered, has the patient taken pre-medications?  Yes    Plan:   Therapy is appropriate, will proceed with treatment.     Chemotherapy Treatment Conditions:   Not Applicable    Intravenous Access:  Implanted Port.    Post Infusion Assessment:  Patient tolerated infusion " without incident.  Blood return noted pre and post infusion.  Site patent and intact, free from redness, edema or discomfort.  No evidence of extravasations.  Access discontinued per protocol.  Biologic Infusion Post Education: Call the triage nurse at your clinic or seek medical attention if you have chills and/or temperature greater than or equal to 100.5, uncontrolled nausea/vomiting, diarrhea, constipation, dizziness, shortness of breath, chest pain, heart palpitations, weakness or any other new or concerning symptoms, questions or concerns.  You cannot have any live virus vaccines prior to or during treatment or up to 6 months post infusion.  If you have an upcoming surgery, medical procedure or dental procedure during treatment, this should be discussed with your ordering physician and your surgeon/dentist.  If you are having any concerning symptom, if you are unsure if you should get your next infusion or wish to speak to a provider before your next infusion, please call your care coordinator or triage nurse at your clinic to notify them so we can adequately serve you.     Note: Pt states she is feeling well; has slight cough but no fever, etc.  No changes in symptoms since last infusion.  She still has some dizziness.  Port was accessed without difficulty, good blood return noted.  A total of 30ml NS flushes were utilized for today s infusion.  Port deaccessed at completion of infusion.    Next Visit Plan:   12/10/24 for next Privigen infusion.      Thuy Freitas RN 11/12/2024

## 2024-12-05 ENCOUNTER — HOME INFUSION BILLING (OUTPATIENT)
Dept: HOME HEALTH SERVICES | Facility: HOME HEALTH | Age: 64
End: 2024-12-05
Payer: COMMERCIAL

## 2024-12-05 PROCEDURE — A4208 3 CC STERILE SYRINGE&NEEDLE: HCPCS

## 2024-12-05 PROCEDURE — A4215 STERILE NEEDLE: HCPCS

## 2024-12-05 PROCEDURE — E1399 DURABLE MEDICAL EQUIPMENT MI: HCPCS

## 2024-12-10 ENCOUNTER — HOME CARE VISIT (OUTPATIENT)
Dept: HOME HEALTH SERVICES | Facility: HOME HEALTH | Age: 64
End: 2024-12-10
Payer: COMMERCIAL

## 2024-12-10 VITALS
RESPIRATION RATE: 16 BRPM | WEIGHT: 117 LBS | HEART RATE: 68 BPM | OXYGEN SATURATION: 94 % | SYSTOLIC BLOOD PRESSURE: 128 MMHG | BODY MASS INDEX: 21.02 KG/M2 | DIASTOLIC BLOOD PRESSURE: 68 MMHG | TEMPERATURE: 97.5 F

## 2024-12-10 PROCEDURE — S9338 HIT IMMUNOTHERAPY DIEM: HCPCS

## 2024-12-10 NOTE — PROGRESS NOTES
"Infusion Nursing Note:  Skilled nurse visit today for Privigen infusion 25g for Shayy Cortez.   present during visit today: Not Applicable.    Pre-infusion Checklist:   Pre-infusion Checklist    Have you had any delayed reaction since last infusion?   No    Have you recently had an elevated temperature, fever, chills productive cough, coughing for 3 weeks or longer or hemoptysis, abnormal vital signs, night sweats, chest pain, or have you noticed a decrease in your appetite, or noted unexplained weight loss or fatigue?   No    Do you have any open wounds or new incisions?  No    Do you have any recent or upcoming hospitalizations, surgeries, or dental procedures?   No    Do you currently have or recently have had any signs of illness or infection or are you on any antibiotics?   No    Have you had any new, sudden , or worsening abdominal pain?   No    Have you or anyone in your household received a live vaccination in the past 4 weeks?   No    Have you recently been diagnosed with any new nervous system diseases or cancer diagnosis? (i.e., Multiple Sclerosis, Guillain [place], sezures, neurological changes) Are you receiving any form of radiation or chemotherapy?   No    Are you pregnant or breastfeeding, or do you have plans of pregnancy in the future?   No    Have you been having any signs of worsening depression or suicidal ideation?  No    Have there been any other new onset medical symptoms?  No    Did the patient answer \"YES\" to any of the questions above?  No    Will the patient receive a medication that has an order for infusion reaction management?  Yes, and all drugs and supplies are available and none have .    If ordered, has the patient taken pre-medications?  Yes    Plan:   Therapy is appropriate, will proceed with treatment.     Chemotherapy Treatment Conditions:   Not Applicable    Intravenous Access:  Implanted Port.    Post Infusion Assessment:  Patient tolerated infusion without " incident.     Note: Patient denies any reaction since last infusion. Patient denies and medication changes, falls or pain currently. Reported still having some dizziness at times. Has fair appetite and good fluid intake. PAC per [facility] policy and procedure, patient tolerated well.   Premeds given 1010am as ordered, acetaminophen 650mg PO, diphenhydramine 50mg PO, hydrocortisone sodium succinate 2mls/100mg IV over 3-5 min, 30 min prior to infusion.   Privigen infusion 25g   Infusion started 1040  Infusion infusion completed 1346  Patient tolerated infusion well, [facility] per [facility] policy and procedure, patient tolerated well.     Saline administered (in mLs): 30mls    Next Visit Plan:   RN revisit for Privigen Infusion, PAC and GA in 28 days       Annalisa Good RN 12/10/2024

## 2024-12-12 PROCEDURE — E0781 EXTERNAL AMBULATORY INFUS PU: HCPCS | Mod: RR

## 2024-12-27 ENCOUNTER — HOME INFUSION (OUTPATIENT)
Dept: HOME HEALTH SERVICES | Facility: HOME HEALTH | Age: 64
End: 2024-12-27
Payer: COMMERCIAL

## 2024-12-31 ENCOUNTER — EPISODE UPDATE (OUTPATIENT)
Dept: HOME HEALTH SERVICES | Facility: HOME HEALTH | Age: 64
End: 2024-12-31
Payer: COMMERCIAL

## 2025-01-03 ENCOUNTER — HOME INFUSION BILLING (OUTPATIENT)
Dept: HOME HEALTH SERVICES | Facility: HOME HEALTH | Age: 65
End: 2025-01-03
Payer: COMMERCIAL

## 2025-01-03 PROCEDURE — A4215 STERILE NEEDLE: HCPCS

## 2025-01-03 PROCEDURE — E1399 DURABLE MEDICAL EQUIPMENT MI: HCPCS

## 2025-01-03 PROCEDURE — A4208 3 CC STERILE SYRINGE&NEEDLE: HCPCS

## 2025-01-07 PROCEDURE — S9338 HIT IMMUNOTHERAPY DIEM: HCPCS

## 2025-01-08 ENCOUNTER — HOME CARE VISIT (OUTPATIENT)
Dept: HOME HEALTH SERVICES | Facility: HOME HEALTH | Age: 65
End: 2025-01-08
Payer: COMMERCIAL

## 2025-01-08 VITALS
SYSTOLIC BLOOD PRESSURE: 122 MMHG | BODY MASS INDEX: 21.56 KG/M2 | HEART RATE: 68 BPM | DIASTOLIC BLOOD PRESSURE: 76 MMHG | RESPIRATION RATE: 18 BRPM | WEIGHT: 120 LBS | OXYGEN SATURATION: 97 % | TEMPERATURE: 98.6 F

## 2025-01-08 NOTE — PROGRESS NOTES
"Infusion Nursing Note:  Skilled nurse visit today for Privigen infusion  for Shayy Cortez.   present during visit today: Not Applicable.    Pre-infusion Checklist:   Pre-infusion Checklist    Have you had any delayed reaction since last infusion?   No    Have you recently had an elevated temperature, fever, chills productive cough, coughing for 3 weeks or longer or hemoptysis, abnormal vital signs, night sweats, chest pain, or have you noticed a decrease in your appetite, or noted unexplained weight loss or fatigue?   No    Do you have any open wounds or new incisions?  No    Do you have any recent or upcoming hospitalizations, surgeries, or dental procedures?   No    Do you currently have or recently have had any signs of illness or infection or are you on any antibiotics?   No    Have you had any new, sudden , or worsening abdominal pain?   No    Have you or anyone in your household received a live vaccination in the past 4 weeks?   No    Have you recently been diagnosed with any new nervous system diseases or cancer diagnosis? (i.e., Multiple Sclerosis, Guillain [place], sezures, neurological changes) Are you receiving any form of radiation or chemotherapy?   No    Are you pregnant or breastfeeding, or do you have plans of pregnancy in the future?   No    Have you been having any signs of worsening depression or suicidal ideation?  No    Have there been any other new onset medical symptoms?  No    Did the patient answer \"YES\" to any of the questions above?  No    Will the patient receive a medication that has an order for infusion reaction management?  Yes, and all drugs and supplies are available and none have .    If ordered, has the patient taken pre-medications?  Yes    Plan:   Therapy is appropriate, will proceed with treatment.     Chemotherapy Treatment Conditions:   Not Applicable    Intravenous Access:  Implanted Port.    Post Infusion Assessment:  Patient tolerated infusion without " incident.  Blood return noted pre and post infusion.  Site patent and intact, free from redness, edema or discomfort.  No evidence of extravasations.  Access discontinued per protocol.  Biologic Infusion Post Education: Call the triage nurse at your clinic or seek medical attention if you have chills and/or temperature greater than or equal to 100.5, uncontrolled nausea/vomiting, diarrhea, constipation, dizziness, shortness of breath, chest pain, heart palpitations, weakness or any other new or concerning symptoms, questions or concerns.  You cannot have any live virus vaccines prior to or during treatment or up to 6 months post infusion.  If you have an upcoming surgery, medical procedure or dental procedure during treatment, this should be discussed with your ordering physician and your surgeon/dentist.  If you are having any concerning symptom, if you are unsure if you should get your next infusion or wish to speak to a provider before your next infusion, please call your care coordinator or triage nurse at your clinic to notify them so we can adequately serve you.     Note: patient alert and oriented and in good spirits. VSS. no c/o pain. she c/o lightheadedness today but this is her baseline. port accessed with good blood return, Privigen infused without adverse reaction then port flushed with good blood return then deaccessed without difficulty.  no other questions or concerns this visit.     Saline administered (in mLs): 30    Next Visit Plan:   2/5/25      Helena Howe RN 1/8/2025

## 2025-01-12 PROCEDURE — E0781 EXTERNAL AMBULATORY INFUS PU: HCPCS | Mod: RR

## 2025-01-14 ENCOUNTER — TELEPHONE (OUTPATIENT)
Dept: NEUROLOGY | Facility: CLINIC | Age: 65
End: 2025-01-14
Payer: COMMERCIAL

## 2025-01-14 RX ORDER — ACETAMINOPHEN 325 MG/1
650 TABLET ORAL ONCE
Start: 2025-01-14

## 2025-01-14 RX ORDER — DIPHENHYDRAMINE HYDROCHLORIDE 50 MG/ML
50 INJECTION INTRAMUSCULAR; INTRAVENOUS
Start: 2025-01-14

## 2025-01-14 RX ORDER — METHYLPREDNISOLONE SODIUM SUCCINATE 40 MG/ML
40 INJECTION INTRAMUSCULAR; INTRAVENOUS
Start: 2025-01-14

## 2025-01-14 RX ORDER — HEPARIN SODIUM,PORCINE 10 UNIT/ML
5-20 VIAL (ML) INTRAVENOUS DAILY PRN
OUTPATIENT
Start: 2025-01-14

## 2025-01-14 RX ORDER — DIPHENHYDRAMINE HCL 25 MG
50 CAPSULE ORAL ONCE
OUTPATIENT
Start: 2025-01-14

## 2025-01-14 RX ORDER — DIPHENHYDRAMINE HYDROCHLORIDE 50 MG/ML
25 INJECTION INTRAMUSCULAR; INTRAVENOUS
Start: 2025-01-14

## 2025-01-14 RX ORDER — HEPARIN SODIUM (PORCINE) LOCK FLUSH IV SOLN 100 UNIT/ML 100 UNIT/ML
5 SOLUTION INTRAVENOUS
OUTPATIENT
Start: 2025-01-14

## 2025-01-14 RX ORDER — MEPERIDINE HYDROCHLORIDE 25 MG/ML
25 INJECTION INTRAMUSCULAR; INTRAVENOUS; SUBCUTANEOUS
OUTPATIENT
Start: 2025-01-14

## 2025-01-14 RX ORDER — HYDROCORTISONE SODIUM SUCCINATE 100 MG/2ML
100 INJECTION INTRAMUSCULAR; INTRAVENOUS ONCE
OUTPATIENT
Start: 2025-01-14

## 2025-01-14 RX ORDER — ALBUTEROL SULFATE 90 UG/1
1-2 INHALANT RESPIRATORY (INHALATION)
Start: 2025-01-14

## 2025-01-14 RX ORDER — EPINEPHRINE 1 MG/ML
0.3 INJECTION, SOLUTION, CONCENTRATE INTRAVENOUS EVERY 5 MIN PRN
OUTPATIENT
Start: 2025-01-14

## 2025-01-14 RX ORDER — ALBUTEROL SULFATE 0.83 MG/ML
2.5 SOLUTION RESPIRATORY (INHALATION)
OUTPATIENT
Start: 2025-01-14

## 2025-01-14 NOTE — TELEPHONE ENCOUNTER
Received a message from [facility] that Shayy's IVIG orders are about to . New orders pended to Dr. Madison.    Erika Lebron RN

## 2025-01-22 ENCOUNTER — HOME INFUSION (OUTPATIENT)
Dept: HOME HEALTH SERVICES | Facility: HOME HEALTH | Age: 65
End: 2025-01-22
Payer: COMMERCIAL

## 2025-01-22 DIAGNOSIS — G25.82 STIFF-MAN SYNDROME: ICD-10-CM

## 2025-01-22 RX ORDER — HUMAN IMMUNOGLOBULIN G 20 G/200ML
20 LIQUID INTRAVENOUS
Qty: 2400 ML | Refills: 0 | Status: DISPENSED | OUTPATIENT
Start: 2025-01-22 | End: 2026-01-14

## 2025-01-22 RX ORDER — DIPHENHYDRAMINE HCL 25 MG
50 CAPSULE ORAL
Qty: 24 CAPSULE | Refills: 0 | Status: DISPENSED | OUTPATIENT
Start: 2025-01-22 | End: 2026-01-14

## 2025-01-22 RX ORDER — ACETAMINOPHEN 325 MG/1
650 TABLET ORAL
Qty: 24 TABLET | Refills: 0 | Status: DISPENSED | OUTPATIENT
Start: 2025-01-22 | End: 2026-01-14

## 2025-01-22 RX ORDER — EPINEPHRINE 0.3 MG/.3ML
0.3 INJECTION SUBCUTANEOUS PRN
Qty: 999999 ML | Refills: 0 | Status: ACTIVE | OUTPATIENT
Start: 2025-01-22 | End: 2026-01-14

## 2025-01-22 RX ORDER — SODIUM CHLORIDE 9 MG/ML
500 INJECTION, SOLUTION INTRAVENOUS PRN
Qty: 999999 ML | Refills: 0 | Status: ACTIVE | OUTPATIENT
Start: 2025-01-22 | End: 2026-01-14

## 2025-01-22 RX ORDER — DIPHENHYDRAMINE HYDROCHLORIDE 50 MG/ML
50 INJECTION INTRAMUSCULAR; INTRAVENOUS PRN
Qty: 99999 ML | Refills: 0 | Status: ACTIVE | OUTPATIENT
Start: 2025-01-22 | End: 2026-01-14

## 2025-01-22 RX ORDER — HUMAN IMMUNOGLOBULIN G 5 G/50ML
5 LIQUID INTRAVENOUS
Qty: 600 ML | Refills: 0 | Status: DISPENSED | OUTPATIENT
Start: 2025-01-22 | End: 2026-01-14

## 2025-01-29 ENCOUNTER — HOME INFUSION BILLING (OUTPATIENT)
Dept: HOME HEALTH SERVICES | Facility: HOME HEALTH | Age: 65
End: 2025-01-29
Payer: COMMERCIAL

## 2025-01-29 PROCEDURE — E1399 DURABLE MEDICAL EQUIPMENT MI: HCPCS

## 2025-01-29 PROCEDURE — A4215 STERILE NEEDLE: HCPCS

## 2025-02-04 ENCOUNTER — HOME CARE VISIT (OUTPATIENT)
Dept: HOME HEALTH SERVICES | Facility: HOME HEALTH | Age: 65
End: 2025-02-04
Payer: COMMERCIAL

## 2025-02-04 VITALS
DIASTOLIC BLOOD PRESSURE: 60 MMHG | TEMPERATURE: 97.8 F | SYSTOLIC BLOOD PRESSURE: 103 MMHG | RESPIRATION RATE: 18 BRPM | OXYGEN SATURATION: 99 % | HEART RATE: 63 BPM

## 2025-02-04 PROCEDURE — S9338 HIT IMMUNOTHERAPY DIEM: HCPCS

## 2025-02-07 ENCOUNTER — VIRTUAL VISIT (OUTPATIENT)
Dept: PHARMACY | Facility: CLINIC | Age: 65
End: 2025-02-07
Attending: PSYCHIATRY & NEUROLOGY
Payer: COMMERCIAL

## 2025-02-07 DIAGNOSIS — G25.82 STIFF-MAN SYNDROME: Primary | ICD-10-CM

## 2025-02-07 NOTE — PROGRESS NOTES
Medication Therapy Management (MTM) Encounter    ASSESSMENT:                            Medication Adherence/Access: No issues identified.    Stiff-Person Syndrome:  Tolerating IVIG infusions. No changes recommended.     PLAN:                            Medication list updated and reviewed   No changes recommended today     Follow-up: 6 months or sooner with Neuro MTM   Appointments in Next Year      Mar 04, 2025 9:00 AM  Home Infusion 4 Hr with Thuy Freitas RN  [facility] [phone]     Mar 07, 2025 10:00 AM  (Arrive by 9:45 AM)  Return MS with Kandy Madison MD  [facility] [place] ([facility] ) [phone]     Mar 28, 2025 11:30 AM  (Arrive by 11:15 AM)  Neurotoxin with Escobar Felix MD  [facility] ([facility] ) [phone]          SUBJECTIVE/OBJECTIVE:                          Shayy Cortez is a 64 year old female seen for a follow-up visit.       Reason for visit: Infusion Follow Up.    Allergies/ADRs: Reviewed in chart  Past Medical History: Reviewed in chart  Tobacco: She reports that she has never smoked. She has never used smokeless tobacco.  Alcohol: none  Specialty Providers: Neurologist: Dr. Madison   Oncologist: Dr. Esteves  Rheumatologist: Dr. Judy Miranda  Nephrologist: Dr. Melara    Medication Adherence/Access: no issues reported.    Stiff Person Syndrome:   - Baclofen 10mg twice daily  - IVIG 0.5g/kg every 28 days. Last infusion was 2/4/25 with [facility].   - Clonazepam 0.5mg every day, 1mg at bedtime and an additional 0.5mg as needed.     Things are going well. She has no concerns with her infusion or medications.     Today's Vitals: There were no vitals taken for this visit.  ----------------    I spent 9 minutes with this patient today. All changes were made via collaborative practice agreement with Kandy Madison  Ragini.     A summary of these recommendations was sent via [employer].    Danyell Fregoso, Pharm.D., MPH  Medication Therapy Management Pharmacist   [facility]    Telemedicine Visit Details  The patient's medications can be safely assessed via a telemedicine encounter.  Type of service:  Telephone visit  Originating Location (pt. Location): Home    Distant Location (provider location):  Off-site  Start Time:  8:00 AM  End Time: 8:09 AM     Medication Therapy Recommendations  No medication therapy recommendations to display

## 2025-02-10 NOTE — PATIENT INSTRUCTIONS
"Recommendations from today's MTM visit:                                                      Medication list updated and reviewed   No changes recommended today     Follow-up: 6 months or sooner with Neuro MTM   Appointments in Next Year      Mar 04, 2025 9:00 AM  Home Infusion 4 Hr with Thuy Freitas RN  [facility] ([facility]) [phone]     Mar 07, 2025 10:00 AM  (Arrive by 9:45 AM)  Return MS with Kandy Madison MD  [facility] [place] ([facility] ) [phone]     Mar 28, 2025 11:30 AM  (Arrive by 11:15 AM)  Neurotoxin with Escobar Felix MD  [facility] ([facility] ) [phone]            It was great speaking with you today.  I value your experience and would be very thankful for your time in providing feedback in our clinic survey. In the next few days, you may receive an email or text message from [employer] with a link to a survey related to your  clinical pharmacist.\"     To schedule another MTM appointment, please call the clinic directly or you may call the MTM scheduling line at [phone].    My Clinical Pharmacist's contact information:                                                      Please feel free to contact me with any questions or concerns you have.      Danyell Fregoso, Pharm.D., MPH  Medication Therapy Management Pharmacist   [facility]   "

## 2025-02-12 PROCEDURE — E0781 EXTERNAL AMBULATORY INFUS PU: HCPCS | Mod: RR

## 2025-02-16 ENCOUNTER — EPISODE UPDATE (OUTPATIENT)
Dept: HOME HEALTH SERVICES | Facility: HOME HEALTH | Age: 65
End: 2025-02-16
Payer: COMMERCIAL

## 2025-03-01 ENCOUNTER — EPISODE UPDATE (OUTPATIENT)
Dept: HOME HEALTH SERVICES | Facility: HOME HEALTH | Age: 65
End: 2025-03-01
Payer: COMMERCIAL

## 2025-03-04 DIAGNOSIS — G25.82 STIFF-MAN SYNDROME: ICD-10-CM

## 2025-03-04 DIAGNOSIS — R26.9 ABNORMAL GAIT: ICD-10-CM

## 2025-03-04 DIAGNOSIS — M62.838 SPASM OF MUSCLE: Primary | ICD-10-CM

## 2025-03-07 ENCOUNTER — OFFICE VISIT (OUTPATIENT)
Dept: NEUROLOGY | Facility: CLINIC | Age: 65
End: 2025-03-07
Attending: PSYCHIATRY & NEUROLOGY
Payer: COMMERCIAL

## 2025-03-07 VITALS
HEART RATE: 73 BPM | HEIGHT: 63 IN | SYSTOLIC BLOOD PRESSURE: 134 MMHG | OXYGEN SATURATION: 98 % | DIASTOLIC BLOOD PRESSURE: 84 MMHG | WEIGHT: 118.4 LBS | BODY MASS INDEX: 20.98 KG/M2

## 2025-03-07 DIAGNOSIS — M47.812 CERVICAL SPONDYLOSIS WITHOUT MYELOPATHY: ICD-10-CM

## 2025-03-07 DIAGNOSIS — Z87.39 H/O RHEUMATOID ARTHRITIS: ICD-10-CM

## 2025-03-07 DIAGNOSIS — G25.82 STIFF PERSON SYNDROME: Primary | ICD-10-CM

## 2025-03-07 DIAGNOSIS — R26.81 GAIT INSTABILITY: ICD-10-CM

## 2025-03-07 DIAGNOSIS — M54.16 LUMBAR RADICULOPATHY: ICD-10-CM

## 2025-03-07 PROCEDURE — 99214 OFFICE O/P EST MOD 30 MIN: CPT | Performed by: PSYCHIATRY & NEUROLOGY

## 2025-03-07 PROCEDURE — G2211 COMPLEX E/M VISIT ADD ON: HCPCS | Performed by: PSYCHIATRY & NEUROLOGY

## 2025-03-07 PROCEDURE — 3079F DIAST BP 80-89 MM HG: CPT | Performed by: PSYCHIATRY & NEUROLOGY

## 2025-03-07 PROCEDURE — 3075F SYST BP GE 130 - 139MM HG: CPT | Performed by: PSYCHIATRY & NEUROLOGY

## 2025-03-07 PROCEDURE — G0463 HOSPITAL OUTPT CLINIC VISIT: HCPCS | Performed by: PSYCHIATRY & NEUROLOGY

## 2025-03-07 PROCEDURE — 1125F AMNT PAIN NOTED PAIN PRSNT: CPT | Performed by: PSYCHIATRY & NEUROLOGY

## 2025-03-07 ASSESSMENT — PAIN SCALES - GENERAL: PAINLEVEL_OUTOF10: MODERATE PAIN (5)

## 2025-03-07 NOTE — PROGRESS NOTES
Date of Service: 3/7/2025    [facility] Neurology   MS Clinic Evaluation    Subjective: 63 y/o woman with melanoma of the abdomen with metastasis to lymph nodes s/p adjuvant chemotherapy and subsequently nivolumab x 1 year who presents in follow up for stiff person syndrome, seronegative.     Companied by her , but provides most of the history independently.    She states that she continues to have dizziness.  Sometimes she will shut her eyes because of the dizziness.  She can feel lightheaded when sitting.  She feels dizzy when walking.    She has a rheumatology appointment coming up.  She is grateful for this that she has been struggling more with joint pain.  She was recently given prednisone taper which was helpful while she was taking it.    She estimates that gait is limited to approximately 1/4-1/2 block at which point she becomes generally fatigued.    She has been struggling with lower back pain and states that her legs are aching.    She continues to have some difficulty with concentration.  Her reaction time is slowed.  However her significant other does notice that she is remembering more information and her attention is gradually improving.  She will still occasionally have some glitches.    She has had 1 UTI since her last visit with me.    She continues to get Botox and finds it helpful for muscle tightness.    She had an MRI in September which was discussed today.    She has been receiving IVIG, but her last dose took place in February 4.  She is awaiting insurance approval for her to receive additional infusions.    Recall that she was noted to have some kidney dysfunction.  She did follow-up with primary care to address this.  Their plan was to follow a CMP routinely.  However her rheumatologist referred her to a nephrologist.  Per review of the nephrology notes it appears as though there is no concern that IVIG could be contributing to nephropathy.    No Known Allergies    Current Outpatient  Medications   Medication Sig Dispense Refill    acetaminophen (TYLENOL) 325 MG tablet Take 2 tablets (650 mg) by mouth every 4 weeks. Administer 30 minutes prior to infusion 24 tablet 0    acetaminophen (TYLENOL) 500 MG tablet Take 500-1,000 mg by mouth every 6 hours as needed for mild pain      baclofen (LIORESAL) 10 MG tablet Take 1 tablet (10 mg) by mouth 2 times daily 60 tablet 11    calcium carbonate 500 mg, elemental, (OSCAL) 500 MG tablet Take 1 tablet (500 mg) by mouth 2 times daily (with meals) 120 tablet 0    citalopram (CELEXA) 20 MG tablet Take 1.5 tablets (30 mg) by mouth daily 45 tablet 11    clonazePAM (KLONOPIN) 0.5 MG tablet Take 1 tablet (0.5 mg) by mouth daily. 60 tablet 5    clonazePAM (KLONOPIN) 0.5 MG tablet Take 1 tablet (0.5 mg) by mouth daily as needed for anxiety (Tremors) 30 tablet 5    clonazePAM (KLONOPIN) 1 MG tablet Take 1 tablet (1 mg) by mouth at bedtime 60 tablet 5    diphenhydrAMINE (BENADRYL) 25 MG capsule Take 2 capsules (50 mg) by mouth every 4 weeks. Administer 30 minutes prior to infusion 24 capsule 0    diphenhydrAMINE (BENADRYL) 50 MG/ML injection Inject 1 mL (50 mg) over 3-5 minutes into the vein via push as needed for other (infusion reaction). For RN use only.  Draw up in a syringe and administer IV push.  Discard remainder of vial. 33498 mL 0    EPINEPHrine (ANY BX GENERIC EQUIV) 0.3 MG/0.3ML injection 2-pack Inject 0.3 mLs (0.3 mg) into the muscle as needed for anaphylaxis (infusion reaction). Administer into the mid-thigh in case of severe anaphylaxis (wheezing, throat tightening, mouth swelling, difficulty breathing). May repeat dose one time in 5-15 minutes if symptoms persist. 217511 mL 0    folic acid (FOLVITE) 1 MG tablet Take 1 tablet (1,000 mcg) by mouth daily 30 tablet 0    hydrocortisone sodium succinate PF (SOLU-CORTEF) 50 mg/mL injection Inject 2 mLs (100 mg) over 3-5 minutes into the vein via push every 4 weeks. Administer 30 minutes prior to infusion 24 mL  0    immune globulin - sucrose free 10 % injection Inject 25.7 g into the vein every 28 (twenty-eight) days      immune globulin - sucrose free 10% (PRIVIGEN) 20 GM/200ML infusion Administer 250 mL (25 g total = 1 vial(s) of 5 g + 1 vial(s) of 20 g) Privigen IV via vial spike and CADD pump. Continuous rate: 15 mL/hr x15 min, 30 mL/hr x15 min, 45 mL/hr x15 min, 60 mL/hr x15 min, 75 mL/hr x15 min, 90 mL/hr x 15 min, 105 mL/hr x 15 min, 120 mL/hr until infusion complete. Do not shake. Discard remainder of vial(s). Infusion time: 3 hours 2400 mL 0    immune globulin - sucrose free 10% (PRIVIGEN) 5 GM/50ML infusion Administer 250 mL (25 g total = 1 vial(s) of 5 g + 1 vial(s) of 20 g) Privigen IV via vial spike and CADD pump. Continuous rate: 15 mL/hr x15 min, 30 mL/hr x15 min, 45 mL/hr x15 min, 60 mL/hr x15 min, 75 mL/hr x15 min, 90 mL/hr x15 min, 105 mL/hr x 15 min, 120 mL/hr until infusion complete. Do not shake. Discard remainder of vial(s). Infusion time: 3 hours 600 mL 0    levothyroxine (SYNTHROID/LEVOTHROID) 50 MCG tablet Take 1 tablet (50 mcg) by mouth daily 30 tablet 0    methylPREDNISolone Na Suc, PF, (SOLU-MEDROL) 125 mg/2 mL injection Inject 2 mLs (125 mg) over 3-5 minutes into the vein via push as needed (infusion reaction). For RN use only.  Reconstitute vial. Draw up methylPREDNISolone in a syringe and administer.  Discard remainder of vial. [id number] mL 0    pantoprazole (PROTONIX) 40 MG EC tablet Take 1 tablet (40 mg) by mouth every morning (before breakfast) 30 tablet 0    polyethylene glycol (MIRALAX) 17 GM/Dose powder Take 17 g by mouth daily (Patient taking differently: Take 17 g by mouth daily as needed for constipation.) 510 g 0    Port Access Kit For nurse use only.  Do not remove items from bag.  Use for port access.  Do not place syringe on sterile field. [id number] kit 0    potassium chloride ER (MICRO-K) 10 MEQ CR capsule Take 1 capsule (10 mEq) by mouth 2 times daily 60 capsule 0    sodium chloride  "0.9% infusion Infuse 500 mLs into the vein as needed for other (infusion reaction). In case of mild reaction, administer via gravity at 20 mL/hr to keep vein open. In case of severe reaction, administer via gravity wide open on prime setting. [id number] mL 0    sodium chloride, PF, 0.9% PF flush Inject 10 mLs into the vein as needed for other (infusion reaction). For RN use only as needed for infusion reaction [id number] mL 0    sodium chloride, PF, 0.9% PF flush Inject 10 mLs into the vein as needed for line flush. Flush IV before and after med administration as directed and/or at least every 24 hours, or prior to deaccessing for no further use and/or at least every 4 weeks when not accessed. [id number] mL 0    Vitamin D3 (CHOLECALCIFEROL) 25 mcg (1000 units) tablet Take 2 tablets (50 mcg) by mouth daily 60 tablet 0     Current Facility-Administered Medications   Medication Dose Route Frequency Provider Last Rate Last Admin    botulinum toxin type A (BOTOX) 100 units injection 300-400 Units  300-400 Units Intramuscular Q90 Days Escobar Felix MD   400 Units at 07/19/24 1134    [START ON 3/28/2025] botulinum toxin type A (BOTOX) 100 units injection 400 Units  400 Units Intramuscular Q90 Days Escobar Felix MD        botulinum toxin type A (BOTOX) 100 units injection 400 Units  400 Units Intramuscular Q90 Days    400 Units at 01/03/25 1153        Past medical, surgical, social and family history was personally reviewed. Pertinent details noted above.     Physical Examination:   /84 (BP Location: Left arm, Patient Position: Sitting, Cuff Size: Adult Small)   Pulse 73   Ht 1.6 m (5' 3\")   Wt 53.7 kg (118 lb 6.4 oz)   SpO2 98%   BMI 20.97 kg/m      General: no acute distress  Cranial nerves:   VFFC  PERRL w/no RAPD  EOM full w/no ALBERTO, vertical gaze evoked nystagmus  Face symmetric  Hearing intact  No dysarthria   Motor:   Tone is normal   Bulk is normal   Minimal tremor "     R L  Deltoid  5 5  Biceps  5 5  Triceps 5 5  Wrist ext 5 5  Finger ext 5 5  Finger abd 5 5    Hip flexion 5 5  Knee flexion 5 5  Knee ext 5 5  Ankle d/f 5 5    Reflexes: 3+ and symmetric throughout, persistent ankle clonus   Sensory: vib sensation is mildly reduced in the toes, jps intact  Romberg is absent  Coordination: no ataxia or dysmetria   Gait: stiff slow cautious gait - left leg appears more stiff, tandem moderately impaired, able to stand on each foot x 10 seconds    Tests/Imaging:   CSF gad65 and dppx neg  Flow w b cells  Cytology negative for malignant cells, +lymphocytosis  Protein 85  32 wbc, lymphocytic  Study 12/14/23 - 2 ocb,    Nirav neg from serum   [place] serum test +gad65 0.07    Elevated cadmium 1.6 (ULN 0.6)    Cr has fluctuated 1.02 -> 1.51    MRI Brain  1/2024-personal review, right cerebellar dva, otherwise unremarkable study aside from some atrophy and mild ischemic changes   9/2024 - stable findings, no progression of atrophy of cerebellum, no abnormal contrast enhancement    MRI Cervical spine   1/2024 - notable degenerative changes but no evidence of myelomalacia  9/2024- moderate degenerative changes     MRI Thoracic spine   N/d    Assessment: 64-year-old woman with a history of rheumatoid arthritis, metastatic melanoma status post nivolumab who acute onset of progressive neurologic symptoms in the setting of CSF positive for inflammatory changes.  She responded positively to IVIG and seems to have sustained a positive response. Clinical course is most suggestive of stiff person syndrome.     Asked to continue with monthly IVIG as this has been effective in controlling her condition and does not increase her risk for major infections.  However we did discuss how a backup treatment option would be rituximab.  This would weaken her immune system, but is unlikely to allow return of melanoma.  We would require confirmation from oncology that this would be in a reasonable approach.    She  was encouraged to work with physical therapy to address her lower back pain as symptoms are most consistent with a lumbar radiculopathy in addition to her cervical spine degenerative changes.  We discussed how the cervical spine can be contributing to some of the dizziness that she is experiencing.    Plan:   - Resume monthly IVIG once insurance approves treatment  - Continue  citalopram 30 mg daily, clonazepam, baclofen  - Physical therapy referral  - Follow-up in 6 months    Note was completed with the assistance of Dragon [employer] software which can often result in accidental word substitutions.   The longitudinal plan of care for the diagnosis(es)/condition(s) as documented were addressed during this visit. Due to the added complexity in care, I will continue to support Shayy in the subsequent management and with ongoing continuity of care.    A total of 30 minutes on the date of service were spent in the care of this patient.   Kandy Madison MD on 3/7/2025 at 10:09 AM

## 2025-03-07 NOTE — NURSING NOTE
Chief Complaint   Patient presents with    MS    RECHECK     6 month follow up      Vitals were taken and medications were reconciled.    Niles Jamil, EMT  9:52 AM

## 2025-03-07 NOTE — LETTER
3/7/2025       RE: Shayy Cortez  [address]  [facility] [address]     Dear Colleague,    Thank you for referring your patient, Shayy Cortez, to the [facility] MULTIPLE SCL[facility]OS[facility] CLIN[facility] [place] at [facility]. Please see a copy of my visit note below.    Date of Service: 3/7/2025    [facility] Neurology   [facility] Clinic Evaluation    Subjective: 65 y/o woman with melanoma of the abdomen with metastasis to lymph nodes s/p adjuvant chemotherapy and subsequently nivolumab x 1 year who presents in follow up for stiff person syndrome, seronegative.     Companied by her , but provides most of the history independently.    She states that she continues to have dizziness.  Sometimes she will shut her eyes because of the dizziness.  She can feel lightheaded when sitting.  She feels dizzy when walking.    She has a rheumatology appointment coming up.  She is grateful for this that she has been struggling more with joint pain.  She was recently given prednisone taper which was helpful while she was taking it.    She estimates that gait is limited to approximately 1/4-1/2 block at which point she becomes generally fatigued.    She has been struggling with lower back pain and states that her legs are aching.    She continues to have some difficulty with concentration.  Her reaction time is slowed.  However her significant other does notice that she is remembering more information and her attention is gradually improving.  She will still occasionally have some glitches.    She has had 1 UTI since her last visit with me.    She continues to get Botox and finds it helpful for muscle tightness.    She had an MRI in September which was discussed today.    She has been receiving IVIG, but her last dose took place in February 4.  She is awaiting insurance approval for her to receive additional infusions.    Recall that she was noted to have some kidney dysfunction.  She  did follow-up with primary care to address this.  Their plan was to follow a CMP routinely.  However her rheumatologist referred her to a nephrologist.  Per review of the nephrology notes it appears as though there is no concern that IVIG could be contributing to nephropathy.    No Known Allergies    Current Outpatient Medications   Medication Sig Dispense Refill     acetaminophen (TYLENOL) 325 MG tablet Take 2 tablets (650 mg) by mouth every 4 weeks. Administer 30 minutes prior to infusion 24 tablet 0     acetaminophen (TYLENOL) 500 MG tablet Take 500-1,000 mg by mouth every 6 hours as needed for mild pain       baclofen (LIORESAL) 10 MG tablet Take 1 tablet (10 mg) by mouth 2 times daily 60 tablet 11     calcium carbonate 500 mg, elemental, (OSCAL) 500 MG tablet Take 1 tablet (500 mg) by mouth 2 times daily (with meals) 120 tablet 0     citalopram (CELEXA) 20 MG tablet Take 1.5 tablets (30 mg) by mouth daily 45 tablet 11     clonazePAM (KLONOPIN) 0.5 MG tablet Take 1 tablet (0.5 mg) by mouth daily. 60 tablet 5     clonazePAM (KLONOPIN) 0.5 MG tablet Take 1 tablet (0.5 mg) by mouth daily as needed for anxiety (Tremors) 30 tablet 5     clonazePAM (KLONOPIN) 1 MG tablet Take 1 tablet (1 mg) by mouth at bedtime 60 tablet 5     diphenhydrAMINE (BENADRYL) 25 MG capsule Take 2 capsules (50 mg) by mouth every 4 weeks. Administer 30 minutes prior to infusion 24 capsule 0     diphenhydrAMINE (BENADRYL) 50 MG/ML injection Inject 1 mL (50 mg) over 3-5 minutes into the vein via push as needed for other (infusion reaction). For RN use only.  Draw up in a syringe and administer IV push.  Discard remainder of vial. 15070 mL 0     EPINEPHrine (ANY BX GENERIC EQUIV) 0.3 MG/0.3ML injection 2-pack Inject 0.3 mLs (0.3 mg) into the muscle as needed for anaphylaxis (infusion reaction). Administer into the mid-thigh in case of severe anaphylaxis (wheezing, throat tightening, mouth swelling, difficulty breathing). May repeat dose one time  in 5-15 minutes if symptoms persist. [id number] mL 0     folic acid (FOLVITE) 1 MG tablet Take 1 tablet (1,000 mcg) by mouth daily 30 tablet 0     hydrocortisone sodium succinate PF (SOLU-CORTEF) 50 mg/mL injection Inject 2 mLs (100 mg) over 3-5 minutes into the vein via push every 4 weeks. Administer 30 minutes prior to infusion 24 mL 0     immune globulin - sucrose free 10 % injection Inject 25.7 g into the vein every 28 (twenty-eight) days       immune globulin - sucrose free 10% (PRIVIGEN) 20 GM/200ML infusion Administer 250 mL (25 g total = 1 vial(s) of 5 g + 1 vial(s) of 20 g) Privigen IV via vial spike and CADD pump. Continuous rate: 15 mL/hr x15 min, 30 mL/hr x15 min, 45 mL/hr x15 min, 60 mL/hr x15 min, 75 mL/hr x15 min, 90 mL/hr x 15 min, 105 mL/hr x 15 min, 120 mL/hr until infusion complete. Do not shake. Discard remainder of vial(s). Infusion time: 3 hours 2400 mL 0     immune globulin - sucrose free 10% (PRIVIGEN) 5 GM/50ML infusion Administer 250 mL (25 g total = 1 vial(s) of 5 g + 1 vial(s) of 20 g) Privigen IV via vial spike and CADD pump. Continuous rate: 15 mL/hr x15 min, 30 mL/hr x15 min, 45 mL/hr x15 min, 60 mL/hr x15 min, 75 mL/hr x15 min, 90 mL/hr x15 min, 105 mL/hr x 15 min, 120 mL/hr until infusion complete. Do not shake. Discard remainder of vial(s). Infusion time: 3 hours 600 mL 0     levothyroxine (SYNTHROID/LEVOTHROID) 50 MCG tablet Take 1 tablet (50 mcg) by mouth daily 30 tablet 0     methylPREDNISolone Na Suc, PF, (SOLU-MEDROL) 125 mg/2 mL injection Inject 2 mLs (125 mg) over 3-5 minutes into the vein via push as needed (infusion reaction). For RN use only.  Reconstitute vial. Draw up methylPREDNISolone in a syringe and administer.  Discard remainder of vial. 349448 mL 0     pantoprazole (PROTONIX) 40 MG EC tablet Take 1 tablet (40 mg) by mouth every morning (before breakfast) 30 tablet 0     polyethylene glycol (MIRALAX) 17 GM/Dose powder Take 17 g by mouth daily (Patient taking  differently: Take 17 g by mouth daily as needed for constipation.) 510 g 0     Port Access Kit For nurse use only.  Do not remove items from bag.  Use for port access.  Do not place syringe on sterile field. [id number] kit 0     potassium chloride ER (MICRO-K) 10 MEQ CR capsule Take 1 capsule (10 mEq) by mouth 2 times daily 60 capsule 0     sodium chloride 0.9% infusion Infuse 500 mLs into the vein as needed for other (infusion reaction). In case of mild reaction, administer via gravity at 20 mL/hr to keep vein open. In case of severe reaction, administer via gravity wide open on prime setting. [id number] mL 0     sodium chloride, PF, 0.9% PF flush Inject 10 mLs into the vein as needed for other (infusion reaction). For RN use only as needed for infusion reaction [id number] mL 0     sodium chloride, PF, 0.9% PF flush Inject 10 mLs into the vein as needed for line flush. Flush IV before and after med administration as directed and/or at least every 24 hours, or prior to deaccessing for no further use and/or at least every 4 weeks when not accessed. 188266 mL 0     Vitamin D3 (CHOLECALCIFEROL) 25 mcg (1000 units) tablet Take 2 tablets (50 mcg) by mouth daily 60 tablet 0     Current Facility-Administered Medications   Medication Dose Route Frequency Provider Last Rate Last Admin     botulinum toxin type A (BOTOX) 100 units injection 300-400 Units  300-400 Units Intramuscular Q90 Days Escobar Felix MD   400 Units at 07/19/24 1134     [START ON 3/28/2025] botulinum toxin type A (BOTOX) 100 units injection 400 Units  400 Units Intramuscular Q90 Days Escobar Felix MD         botulinum toxin type A (BOTOX) 100 units injection 400 Units  400 Units Intramuscular Q90 Days    400 Units at 01/03/25 1153        Past medical, surgical, social and family history was personally reviewed. Pertinent details noted above.     Physical Examination:   /84 (BP Location: Left arm, Patient Position: Sitting, Cuff Size: Adult  "Small)   Pulse 73   Ht 1.6 m (5' 3\")   Wt 53.7 kg (118 lb 6.4 oz)   SpO2 98%   BMI 20.97 kg/m      General: no acute distress  Cranial nerves:   VFFC  PERRL w/no RAPD  EOM full w/no ALBERTO, vertical gaze evoked nystagmus  Face symmetric  Hearing intact  No dysarthria   Motor:   Tone is normal   Bulk is normal   Minimal tremor     R L  Deltoid  5 5  Biceps  5 5  Triceps 5 5  Wrist ext 5 5  Finger ext 5 5  Finger abd 5 5    Hip flexion 5 5  Knee flexion 5 5  Knee ext 5 5  Ankle d/f 5 5    Reflexes: 3+ and symmetric throughout, persistent ankle clonus   Sensory: vib sensation is mildly reduced in the toes, jps intact  Romberg is absent  Coordination: no ataxia or dysmetria   Gait: stiff slow cautious gait - left leg appears more stiff, tandem moderately impaired, able to stand on each foot x 10 seconds    Tests/Imaging:   CSF gad65 and dppx neg  Flow w b cells  Cytology negative for malignant cells, +lymphocytosis  Protein 85  32 wbc, lymphocytic  Study 12/14/23 - 2 ocb,    Nirav neg from serum   Bay serum test +gad65 0.07    Elevated cadmium 1.6 (ULN 0.6)    Cr has fluctuated 1.02 -> 1.51    MRI Brain  1/2024-personal review, right cerebellar dva, otherwise unremarkable study aside from some atrophy and mild ischemic changes   9/2024 - stable findings, no progression of atrophy of cerebellum, no abnormal contrast enhancement    MRI Cervical spine   1/2024 - notable degenerative changes but no evidence of myelomalacia  9/2024- moderate degenerative changes     MRI Thoracic spine   N/d    Assessment: 64-year-old woman with a history of rheumatoid arthritis, metastatic melanoma status post nivolumab who acute onset of progressive neurologic symptoms in the setting of CSF positive for inflammatory changes.  She responded positively to IVIG and seems to have sustained a positive response. Clinical course is most suggestive of stiff person syndrome.     Asked to continue with monthly IVIG as this has been effective in " controlling her condition and does not increase her risk for major infections.  However we did discuss how a backup treatment option would be rituximab.  This would weaken her immune system, but is unlikely to allow return of melanoma.  We would require confirmation from oncology that this would be in a reasonable approach.    She was encouraged to work with physical therapy to address her lower back pain as symptoms are most consistent with a lumbar radiculopathy in addition to her cervical spine degenerative changes.  We discussed how the cervical spine can be contributing to some of the dizziness that she is experiencing.    Plan:   - Resume monthly IVIG once insurance approves treatment  - Continue  citalopram 30 mg daily, clonazepam, baclofen  - Physical therapy referral  - Follow-up in 6 months    Note was completed with the assistance of Dragon Fluency software which can often result in accidental word substitutions.   The longitudinal plan of care for the diagnosis(es)/condition(s) as documented were addressed during this visit. Due to the added complexity in care, I will continue to support Shayy in the subsequent management and with ongoing continuity of care.    A total of 30 minutes on the date of service were spent in the care of this patient.   Kandy Madison MD on 3/7/2025 at 10:09 AM              Again, thank you for allowing me to participate in the care of your patient.      Sincerely,    Kandy Madison MD

## 2025-03-07 NOTE — PATIENT INSTRUCTIONS
Work with physical therapy     Hopefully you can continue IVIG   If not, back up plan is rituximab     Continue your current medications     Follow up in 6 months

## 2025-03-09 ENCOUNTER — HEALTH MAINTENANCE LETTER (OUTPATIENT)
Age: 65
End: 2025-03-09

## 2025-03-10 DIAGNOSIS — G25.82 STIFF-MAN SYNDROME: ICD-10-CM

## 2025-03-10 DIAGNOSIS — M62.89 MUSCLE STIFFNESS: ICD-10-CM

## 2025-03-11 RX ORDER — CLONAZEPAM 1 MG/1
1 TABLET ORAL AT BEDTIME
Qty: 60 TABLET | Refills: 5 | Status: SHIPPED | OUTPATIENT
Start: 2025-03-11

## 2025-03-11 RX ORDER — CLONAZEPAM 0.5 MG/1
0.5 TABLET ORAL DAILY PRN
Qty: 30 TABLET | Refills: 5 | Status: SHIPPED | OUTPATIENT
Start: 2025-03-11

## 2025-03-18 NOTE — PROGRESS NOTES
[facility] Medicare effective 3/1/25  Per Isauro ENGLISH [facility]: Stiff-man syndrome is NOT a covered dx.    I spoke with patient explained her IVIG therapy is not covered for home infusion. She does not want to self-pay.    She said she would go to infusion center for service.    I relayed this information via email to Blue team.

## 2025-03-24 DIAGNOSIS — G25.82 STIFF-MAN SYNDROME: Primary | ICD-10-CM

## 2025-03-26 ENCOUNTER — HOME INFUSION (OUTPATIENT)
Dept: HOME HEALTH SERVICES | Facility: HOME HEALTH | Age: 65
End: 2025-03-26
Payer: COMMERCIAL

## 2025-04-16 DIAGNOSIS — F41.9 ANXIETY: ICD-10-CM

## 2025-04-16 RX ORDER — CITALOPRAM HYDROBROMIDE 20 MG/1
30 TABLET ORAL DAILY
Qty: 45 TABLET | Refills: 11 | Status: SHIPPED | OUTPATIENT
Start: 2025-04-16 | End: 2025-04-24

## 2025-04-16 NOTE — TELEPHONE ENCOUNTER
Received refill request for citalopram from [facility]; Patient was last seen in March 2025 and has follow up appointment in Sep 2025 with Dr Madison. Refilled per [facility] refill protocol.    Prudence Quiles RN

## 2025-04-17 ENCOUNTER — INFUSION THERAPY VISIT (OUTPATIENT)
Dept: INFUSION THERAPY | Facility: CLINIC | Age: 65
End: 2025-04-17
Attending: PSYCHIATRY & NEUROLOGY
Payer: COMMERCIAL

## 2025-04-17 VITALS
BODY MASS INDEX: 21.61 KG/M2 | OXYGEN SATURATION: 97 % | SYSTOLIC BLOOD PRESSURE: 121 MMHG | RESPIRATION RATE: 16 BRPM | DIASTOLIC BLOOD PRESSURE: 74 MMHG | WEIGHT: 122 LBS | TEMPERATURE: 97.7 F | HEART RATE: 70 BPM

## 2025-04-17 DIAGNOSIS — G25.82 STIFF-MAN SYNDROME: Primary | ICD-10-CM

## 2025-04-17 DIAGNOSIS — Z95.828 PORT-A-CATH IN PLACE: ICD-10-CM

## 2025-04-17 PROCEDURE — 250N000013 HC RX MED GY IP 250 OP 250 PS 637: Performed by: PSYCHIATRY & NEUROLOGY

## 2025-04-17 PROCEDURE — 250N000011 HC RX IP 250 OP 636: Mod: JZ | Performed by: PSYCHIATRY & NEUROLOGY

## 2025-04-17 RX ORDER — DIPHENHYDRAMINE HYDROCHLORIDE 50 MG/ML
25 INJECTION, SOLUTION INTRAMUSCULAR; INTRAVENOUS
Start: 2025-05-15

## 2025-04-17 RX ORDER — HEPARIN SODIUM,PORCINE 10 UNIT/ML
5-20 VIAL (ML) INTRAVENOUS DAILY PRN
OUTPATIENT
Start: 2025-05-15

## 2025-04-17 RX ORDER — EPINEPHRINE 1 MG/ML
0.3 INJECTION, SOLUTION, CONCENTRATE INTRAVENOUS EVERY 5 MIN PRN
OUTPATIENT
Start: 2025-05-15

## 2025-04-17 RX ORDER — HYDROCORTISONE SODIUM SUCCINATE 100 MG/2ML
100 INJECTION INTRAMUSCULAR; INTRAVENOUS ONCE
OUTPATIENT
Start: 2025-05-15

## 2025-04-17 RX ORDER — HEPARIN SODIUM (PORCINE) LOCK FLUSH IV SOLN 100 UNIT/ML 100 UNIT/ML
5 SOLUTION INTRAVENOUS
Status: DISCONTINUED | OUTPATIENT
Start: 2025-04-17 | End: 2025-04-17 | Stop reason: HOSPADM

## 2025-04-17 RX ORDER — HEPARIN SODIUM (PORCINE) LOCK FLUSH IV SOLN 100 UNIT/ML 100 UNIT/ML
5 SOLUTION INTRAVENOUS
OUTPATIENT
Start: 2025-04-17

## 2025-04-17 RX ORDER — ALBUTEROL SULFATE 90 UG/1
1-2 INHALANT RESPIRATORY (INHALATION)
Start: 2025-05-15

## 2025-04-17 RX ORDER — HEPARIN SODIUM (PORCINE) LOCK FLUSH IV SOLN 100 UNIT/ML 100 UNIT/ML
5 SOLUTION INTRAVENOUS
OUTPATIENT
Start: 2025-05-15

## 2025-04-17 RX ORDER — ACETAMINOPHEN 325 MG/1
650 TABLET ORAL ONCE
Start: 2025-05-15

## 2025-04-17 RX ORDER — METHYLPREDNISOLONE SODIUM SUCCINATE 40 MG/ML
40 INJECTION INTRAMUSCULAR; INTRAVENOUS
Start: 2025-05-15

## 2025-04-17 RX ORDER — ALBUTEROL SULFATE 0.83 MG/ML
2.5 SOLUTION RESPIRATORY (INHALATION)
OUTPATIENT
Start: 2025-05-15

## 2025-04-17 RX ORDER — DIPHENHYDRAMINE HYDROCHLORIDE 50 MG/ML
50 INJECTION, SOLUTION INTRAMUSCULAR; INTRAVENOUS
Start: 2025-05-15

## 2025-04-17 RX ORDER — DIPHENHYDRAMINE HCL 25 MG
50 CAPSULE ORAL ONCE
OUTPATIENT
Start: 2025-05-15

## 2025-04-17 RX ORDER — MEPERIDINE HYDROCHLORIDE 25 MG/ML
25 INJECTION INTRAMUSCULAR; INTRAVENOUS; SUBCUTANEOUS
OUTPATIENT
Start: 2025-05-15

## 2025-04-17 RX ORDER — ACETAMINOPHEN 325 MG/1
650 TABLET ORAL ONCE
Status: COMPLETED | OUTPATIENT
Start: 2025-04-17 | End: 2025-04-17

## 2025-04-17 RX ORDER — HYDROCORTISONE SODIUM SUCCINATE 100 MG/2ML
100 INJECTION INTRAMUSCULAR; INTRAVENOUS ONCE
Status: COMPLETED | OUTPATIENT
Start: 2025-04-17 | End: 2025-04-17

## 2025-04-17 RX ORDER — DIPHENHYDRAMINE HCL 25 MG
50 CAPSULE ORAL ONCE
Status: COMPLETED | OUTPATIENT
Start: 2025-04-17 | End: 2025-04-17

## 2025-04-17 RX ADMIN — HUMAN IMMUNOGLOBULIN G 25 G: 20 LIQUID INTRAVENOUS at 09:23

## 2025-04-17 RX ADMIN — ACETAMINOPHEN 650 MG: 325 TABLET ORAL at 08:53

## 2025-04-17 RX ADMIN — HYDROCORTISONE SODIUM SUCCINATE 100 MG: 100 INJECTION, POWDER, FOR SOLUTION INTRAMUSCULAR; INTRAVENOUS at 08:55

## 2025-04-17 RX ADMIN — HEPARIN 5 ML: 100 SYRINGE at 11:27

## 2025-04-17 RX ADMIN — DIPHENHYDRAMINE HYDROCHLORIDE 50 MG: 25 CAPSULE ORAL at 08:53

## 2025-04-17 NOTE — PROGRESS NOTES
Infusion Nursing Note:  Shayy Cortez presents today for IVIG.    Patient seen by provider today: No   present during visit today: Not Applicable.    Note: N/A.      Intravenous Access:  Implanted Port. Great blood return. Needs to be setup with a monthly flush. Pt reported that she had not had her port accessed for flushed for over a month.     Treatment Conditions:  Not Applicable.      Post Infusion Assessment:  Patient tolerated infusion without incident.  Site patent and intact, free from redness, edema or discomfort.  No evidence of extravasations.  Access discontinued per protocol.       Discharge Plan:   Patient and/or family verbalized understanding of discharge instructions and all questions answered.  Patient discharged in stable condition accompanied by: self and .  Departure Mode: Ambulatory.      Stacie Menjivar RN

## 2025-04-21 ENCOUNTER — MYC MEDICAL ADVICE (OUTPATIENT)
Dept: NEUROLOGY | Facility: CLINIC | Age: 65
End: 2025-04-21
Payer: COMMERCIAL

## 2025-04-21 DIAGNOSIS — F41.9 ANXIETY: ICD-10-CM

## 2025-04-21 DIAGNOSIS — G25.82 STIFF-MAN SYNDROME: ICD-10-CM

## 2025-04-24 RX ORDER — CLONAZEPAM 0.5 MG/1
0.5 TABLET ORAL DAILY
Qty: 60 TABLET | Refills: 5 | Status: SHIPPED | OUTPATIENT
Start: 2025-04-24

## 2025-04-24 RX ORDER — CITALOPRAM HYDROBROMIDE 40 MG/1
40 TABLET ORAL DAILY
Qty: 90 TABLET | Refills: 3 | Status: SHIPPED | OUTPATIENT
Start: 2025-04-24

## 2025-04-29 RX ORDER — HUMAN IMMUNOGLOBULIN G 20 G/200ML
0.5 LIQUID INTRAVENOUS ONCE
Status: CANCELLED
Start: 2025-04-29 | End: 2025-04-29

## 2025-05-15 ENCOUNTER — INFUSION THERAPY VISIT (OUTPATIENT)
Dept: INFUSION THERAPY | Facility: CLINIC | Age: 65
End: 2025-05-15
Attending: PSYCHIATRY & NEUROLOGY
Payer: COMMERCIAL

## 2025-05-15 VITALS
SYSTOLIC BLOOD PRESSURE: 117 MMHG | RESPIRATION RATE: 16 BRPM | WEIGHT: 121.5 LBS | TEMPERATURE: 97.6 F | OXYGEN SATURATION: 97 % | BODY MASS INDEX: 21.52 KG/M2 | HEART RATE: 82 BPM | DIASTOLIC BLOOD PRESSURE: 73 MMHG

## 2025-05-15 DIAGNOSIS — G25.82 STIFF-MAN SYNDROME: Primary | ICD-10-CM

## 2025-05-15 PROCEDURE — 250N000013 HC RX MED GY IP 250 OP 250 PS 637: Performed by: PSYCHIATRY & NEUROLOGY

## 2025-05-15 PROCEDURE — 250N000011 HC RX IP 250 OP 636: Performed by: PSYCHIATRY & NEUROLOGY

## 2025-05-15 RX ORDER — EPINEPHRINE 1 MG/ML
0.3 INJECTION, SOLUTION INTRAMUSCULAR; SUBCUTANEOUS EVERY 5 MIN PRN
OUTPATIENT
Start: 2025-06-12

## 2025-05-15 RX ORDER — PREDNISONE 2.5 MG/1
1 TABLET ORAL EVERY MORNING
COMMUNITY
Start: 2025-04-08

## 2025-05-15 RX ORDER — ACETAMINOPHEN 325 MG/1
650 TABLET ORAL ONCE
Status: COMPLETED | OUTPATIENT
Start: 2025-05-15 | End: 2025-05-15

## 2025-05-15 RX ORDER — HYDROCORTISONE SODIUM SUCCINATE 100 MG/2ML
100 INJECTION INTRAMUSCULAR; INTRAVENOUS ONCE
OUTPATIENT
Start: 2025-06-12

## 2025-05-15 RX ORDER — HUMAN IMMUNOGLOBULIN G 20 G/200ML
0.5 LIQUID INTRAVENOUS ONCE
Status: COMPLETED | OUTPATIENT
Start: 2025-05-15 | End: 2025-05-15

## 2025-05-15 RX ORDER — HUMAN IMMUNOGLOBULIN G 20 G/200ML
0.5 LIQUID INTRAVENOUS ONCE
Start: 2025-06-12 | End: 2025-06-12

## 2025-05-15 RX ORDER — HEPARIN SODIUM (PORCINE) LOCK FLUSH IV SOLN 100 UNIT/ML 100 UNIT/ML
5 SOLUTION INTRAVENOUS
Status: DISCONTINUED | OUTPATIENT
Start: 2025-05-15 | End: 2025-05-15 | Stop reason: HOSPADM

## 2025-05-15 RX ORDER — DIPHENHYDRAMINE HYDROCHLORIDE 50 MG/ML
25 INJECTION, SOLUTION INTRAMUSCULAR; INTRAVENOUS
Start: 2025-06-12

## 2025-05-15 RX ORDER — MEPERIDINE HYDROCHLORIDE 25 MG/ML
25 INJECTION INTRAMUSCULAR; INTRAVENOUS; SUBCUTANEOUS
OUTPATIENT
Start: 2025-06-12

## 2025-05-15 RX ORDER — ALBUTEROL SULFATE 90 UG/1
1-2 INHALANT RESPIRATORY (INHALATION)
Start: 2025-06-12

## 2025-05-15 RX ORDER — ALBUTEROL SULFATE 0.83 MG/ML
2.5 SOLUTION RESPIRATORY (INHALATION)
OUTPATIENT
Start: 2025-06-12

## 2025-05-15 RX ORDER — ACETAMINOPHEN 325 MG/1
650 TABLET ORAL ONCE
Start: 2025-06-12

## 2025-05-15 RX ORDER — DIPHENHYDRAMINE HYDROCHLORIDE 50 MG/ML
50 INJECTION, SOLUTION INTRAMUSCULAR; INTRAVENOUS
Start: 2025-06-12

## 2025-05-15 RX ORDER — HEPARIN SODIUM,PORCINE 10 UNIT/ML
5-20 VIAL (ML) INTRAVENOUS DAILY PRN
OUTPATIENT
Start: 2025-06-12

## 2025-05-15 RX ORDER — HYDROCORTISONE SODIUM SUCCINATE 100 MG/2ML
100 INJECTION INTRAMUSCULAR; INTRAVENOUS ONCE
Status: COMPLETED | OUTPATIENT
Start: 2025-05-15 | End: 2025-05-15

## 2025-05-15 RX ORDER — HEPARIN SODIUM (PORCINE) LOCK FLUSH IV SOLN 100 UNIT/ML 100 UNIT/ML
5 SOLUTION INTRAVENOUS
OUTPATIENT
Start: 2025-06-12

## 2025-05-15 RX ORDER — DIPHENHYDRAMINE HCL 25 MG
50 CAPSULE ORAL ONCE
Status: COMPLETED | OUTPATIENT
Start: 2025-05-15 | End: 2025-05-15

## 2025-05-15 RX ORDER — METHYLPREDNISOLONE SODIUM SUCCINATE 40 MG/ML
40 INJECTION INTRAMUSCULAR; INTRAVENOUS
Start: 2025-06-12

## 2025-05-15 RX ORDER — DIPHENHYDRAMINE HCL 25 MG
50 CAPSULE ORAL ONCE
OUTPATIENT
Start: 2025-06-12

## 2025-05-15 RX ADMIN — Medication 5 ML: at 13:16

## 2025-05-15 RX ADMIN — ACETAMINOPHEN 650 MG: 325 TABLET ORAL at 10:46

## 2025-05-15 RX ADMIN — HYDROCORTISONE SODIUM SUCCINATE 100 MG: 100 INJECTION, POWDER, FOR SOLUTION INTRAMUSCULAR; INTRAVENOUS at 10:47

## 2025-05-15 RX ADMIN — DIPHENHYDRAMINE HYDROCHLORIDE 50 MG: 25 CAPSULE ORAL at 10:46

## 2025-05-15 RX ADMIN — HUMAN IMMUNOGLOBULIN G 25 G: 5 LIQUID INTRAVENOUS at 10:53

## 2025-05-15 NOTE — PROGRESS NOTES
"Infusion Nursing Note:  Shayy Cortez presents today for IVIG.    Patient seen by provider today: No   present during visit today: Not Applicable.    Note: Patient new to [facility]. She has been having home infusions until an insurance change. Patient and significant other oriented to surroundings and call light. She states that she's been tolerating the infusions well and that they have helped a lot with her \"stiff-man syndrome\" symptoms. Patient took premedications of oral tylenol/benadryl and IV solucortef; see MAR. IVIG rates listed below. Writer had these double checked by Francoise REESE.    IVIG Privigen Rates based on 52kg IBW, initial rate 0.5ml kg/hr every 15 mins to a max rate of 4ml/kg/hr for kidney risk     *26 ml/hr x 15 mins  *52 ml/hr x 15 mins  *78 ml/hr x 15 mins  *104 ml/hr x 15 mins  *130 ml/hr x 15 mins  *156 ml/hr x 15 mins  *182 ml/hr x 15 mins  *208 ml/hr x remainder (small bottle)      Intravenous Access:  Implanted Port accessed per protocol; see flowsheet.    Treatment Conditions:  Denies fever, recent illness, major infection, productive cough, recent surgery, or elevated temperature. See above note about UTI.      Post Infusion Assessment:  Patient tolerated infusion without incident.  Blood return noted pre and post infusion.  Site patent and intact, free from redness, edema or discomfort.  No evidence of extravasations.  Access discontinued per protocol.       Discharge Plan:   [facility]  Patient will return 6/12/25 for next appointment.   Patient discharged in stable condition accompanied by: significant other.  Departure Mode: Ambulatory.      Liana Huynh RN  "

## 2025-06-12 ENCOUNTER — INFUSION THERAPY VISIT (OUTPATIENT)
Dept: INFUSION THERAPY | Facility: CLINIC | Age: 65
End: 2025-06-12
Attending: PSYCHIATRY & NEUROLOGY
Payer: COMMERCIAL

## 2025-06-12 VITALS
HEART RATE: 108 BPM | OXYGEN SATURATION: 94 % | BODY MASS INDEX: 22.07 KG/M2 | TEMPERATURE: 97.8 F | WEIGHT: 124.6 LBS | SYSTOLIC BLOOD PRESSURE: 118 MMHG | DIASTOLIC BLOOD PRESSURE: 62 MMHG | RESPIRATION RATE: 16 BRPM

## 2025-06-12 DIAGNOSIS — G25.82 STIFF-MAN SYNDROME: Primary | ICD-10-CM

## 2025-06-12 PROCEDURE — 250N000013 HC RX MED GY IP 250 OP 250 PS 637: Performed by: PSYCHIATRY & NEUROLOGY

## 2025-06-12 PROCEDURE — 250N000011 HC RX IP 250 OP 636: Mod: JZ | Performed by: PSYCHIATRY & NEUROLOGY

## 2025-06-12 RX ORDER — MEPERIDINE HYDROCHLORIDE 25 MG/ML
25 INJECTION INTRAMUSCULAR; INTRAVENOUS; SUBCUTANEOUS
OUTPATIENT
Start: 2025-07-10

## 2025-06-12 RX ORDER — HYDROCORTISONE SODIUM SUCCINATE 100 MG/2ML
100 INJECTION INTRAMUSCULAR; INTRAVENOUS ONCE
Status: COMPLETED | OUTPATIENT
Start: 2025-06-12 | End: 2025-06-12

## 2025-06-12 RX ORDER — DIPHENHYDRAMINE HCL 25 MG
50 CAPSULE ORAL ONCE
Status: COMPLETED | OUTPATIENT
Start: 2025-06-12 | End: 2025-06-12

## 2025-06-12 RX ORDER — METHYLPREDNISOLONE SODIUM SUCCINATE 40 MG/ML
40 INJECTION INTRAMUSCULAR; INTRAVENOUS
Start: 2025-07-10

## 2025-06-12 RX ORDER — HUMAN IMMUNOGLOBULIN G 20 G/200ML
0.5 LIQUID INTRAVENOUS ONCE
Start: 2025-07-10 | End: 2025-07-10

## 2025-06-12 RX ORDER — HEPARIN SODIUM (PORCINE) LOCK FLUSH IV SOLN 100 UNIT/ML 100 UNIT/ML
5 SOLUTION INTRAVENOUS
Status: DISCONTINUED | OUTPATIENT
Start: 2025-06-12 | End: 2025-06-12 | Stop reason: HOSPADM

## 2025-06-12 RX ORDER — HEPARIN SODIUM (PORCINE) LOCK FLUSH IV SOLN 100 UNIT/ML 100 UNIT/ML
5 SOLUTION INTRAVENOUS
OUTPATIENT
Start: 2025-07-10

## 2025-06-12 RX ORDER — HUMAN IMMUNOGLOBULIN G 20 G/200ML
0.5 LIQUID INTRAVENOUS ONCE
Status: COMPLETED | OUTPATIENT
Start: 2025-06-12 | End: 2025-06-12

## 2025-06-12 RX ORDER — HYDROCORTISONE SODIUM SUCCINATE 100 MG/2ML
100 INJECTION INTRAMUSCULAR; INTRAVENOUS ONCE
OUTPATIENT
Start: 2025-07-10

## 2025-06-12 RX ORDER — ACETAMINOPHEN 325 MG/1
650 TABLET ORAL ONCE
Status: COMPLETED | OUTPATIENT
Start: 2025-06-12 | End: 2025-06-12

## 2025-06-12 RX ORDER — DIPHENHYDRAMINE HYDROCHLORIDE 50 MG/ML
50 INJECTION, SOLUTION INTRAMUSCULAR; INTRAVENOUS
Start: 2025-07-10

## 2025-06-12 RX ORDER — HEPARIN SODIUM,PORCINE 10 UNIT/ML
5-20 VIAL (ML) INTRAVENOUS DAILY PRN
Status: DISCONTINUED | OUTPATIENT
Start: 2025-06-12 | End: 2025-06-12 | Stop reason: HOSPADM

## 2025-06-12 RX ORDER — HEPARIN SODIUM,PORCINE 10 UNIT/ML
5-20 VIAL (ML) INTRAVENOUS DAILY PRN
OUTPATIENT
Start: 2025-07-10

## 2025-06-12 RX ORDER — DIPHENHYDRAMINE HYDROCHLORIDE 50 MG/ML
25 INJECTION, SOLUTION INTRAMUSCULAR; INTRAVENOUS
Start: 2025-07-10

## 2025-06-12 RX ORDER — ALBUTEROL SULFATE 0.83 MG/ML
2.5 SOLUTION RESPIRATORY (INHALATION)
OUTPATIENT
Start: 2025-07-10

## 2025-06-12 RX ORDER — DIPHENHYDRAMINE HCL 25 MG
50 CAPSULE ORAL ONCE
OUTPATIENT
Start: 2025-07-10

## 2025-06-12 RX ORDER — ACETAMINOPHEN 325 MG/1
650 TABLET ORAL ONCE
Start: 2025-07-10

## 2025-06-12 RX ORDER — EPINEPHRINE 1 MG/ML
0.3 INJECTION, SOLUTION INTRAMUSCULAR; SUBCUTANEOUS EVERY 5 MIN PRN
OUTPATIENT
Start: 2025-07-10

## 2025-06-12 RX ORDER — ALBUTEROL SULFATE 90 UG/1
1-2 INHALANT RESPIRATORY (INHALATION)
Start: 2025-07-10

## 2025-06-12 RX ADMIN — HYDROCORTISONE SODIUM SUCCINATE 100 MG: 100 INJECTION, POWDER, FOR SOLUTION INTRAMUSCULAR; INTRAVENOUS at 11:25

## 2025-06-12 RX ADMIN — HUMAN IMMUNOGLOBULIN G 25 G: 5 LIQUID INTRAVENOUS at 11:40

## 2025-06-12 RX ADMIN — ACETAMINOPHEN 650 MG: 325 TABLET ORAL at 11:24

## 2025-06-12 RX ADMIN — DIPHENHYDRAMINE HYDROCHLORIDE 50 MG: 25 CAPSULE ORAL at 11:24

## 2025-06-12 RX ADMIN — Medication 5 ML: at 13:46

## 2025-06-12 NOTE — PROGRESS NOTES
Infusion Nursing Note:  Shayy Cortez presents today for IVIG.    Patient seen by provider today: No   present during visit today: Not Applicable.    Note: Pt reports that she had no side effects from first IVIG at the infusion center.     IVIG Privigen Rates based on 52kg IBW, initial rate 0.5ml kg/hr every 15 mins to a max rate of 4ml/kg/hr for kidney risk      26 mL/hr x 15 mins  52 mL/hr x 15 mins  78 mL/hr x 15 mins  104 mL/hr x 15 mins  130 mL/hr x 15 mins  156 mL/hr x 15 mins  182 mL/hr x 15 mins  208 mL/hr x remainder (small bottle).      Intravenous Access:  Implanted Port.    Treatment Conditions:  Denies fever, recent illness, major infection, productive cough, recent surgery, or elevated temperature.      Post Infusion Assessment:  Patient tolerated infusion without incident.  Blood return noted pre and post infusion.  No evidence of extravasations.  Access discontinued per protocol.      Discharge Plan:   [facility]  Patient will return 07/10/2025 for next appointment.   Patient discharged in stable condition accompanied by: .  Departure Mode: Ambulatory.      Jackeline Ybarra RN

## 2025-07-03 RX ORDER — HUMAN IMMUNOGLOBULIN G 20 G/200ML
0.5 LIQUID INTRAVENOUS ONCE
Start: 2025-07-10 | End: 2025-07-10

## 2025-07-10 ENCOUNTER — INFUSION THERAPY VISIT (OUTPATIENT)
Dept: INFUSION THERAPY | Facility: CLINIC | Age: 65
End: 2025-07-10
Attending: PSYCHIATRY & NEUROLOGY
Payer: COMMERCIAL

## 2025-07-10 VITALS
WEIGHT: 126.1 LBS | TEMPERATURE: 98 F | RESPIRATION RATE: 16 BRPM | OXYGEN SATURATION: 97 % | BODY MASS INDEX: 22.34 KG/M2 | SYSTOLIC BLOOD PRESSURE: 124 MMHG | DIASTOLIC BLOOD PRESSURE: 78 MMHG | HEART RATE: 89 BPM

## 2025-07-10 DIAGNOSIS — G25.82 STIFF-MAN SYNDROME: Primary | ICD-10-CM

## 2025-07-10 PROCEDURE — 250N000011 HC RX IP 250 OP 636: Mod: JZ | Performed by: PSYCHIATRY & NEUROLOGY

## 2025-07-10 PROCEDURE — 250N000013 HC RX MED GY IP 250 OP 250 PS 637: Performed by: PSYCHIATRY & NEUROLOGY

## 2025-07-10 RX ORDER — ALBUTEROL SULFATE 0.83 MG/ML
2.5 SOLUTION RESPIRATORY (INHALATION)
OUTPATIENT
Start: 2025-08-07

## 2025-07-10 RX ORDER — HEPARIN SODIUM (PORCINE) LOCK FLUSH IV SOLN 100 UNIT/ML 100 UNIT/ML
5 SOLUTION INTRAVENOUS
Status: DISCONTINUED | OUTPATIENT
Start: 2025-07-10 | End: 2025-07-10 | Stop reason: HOSPADM

## 2025-07-10 RX ORDER — HUMAN IMMUNOGLOBULIN G 20 G/200ML
0.5 LIQUID INTRAVENOUS ONCE
Start: 2025-08-07 | End: 2025-08-07

## 2025-07-10 RX ORDER — HEPARIN SODIUM,PORCINE 10 UNIT/ML
5-20 VIAL (ML) INTRAVENOUS DAILY PRN
OUTPATIENT
Start: 2025-08-07

## 2025-07-10 RX ORDER — MEPERIDINE HYDROCHLORIDE 25 MG/ML
25 INJECTION INTRAMUSCULAR; INTRAVENOUS; SUBCUTANEOUS
OUTPATIENT
Start: 2025-08-07

## 2025-07-10 RX ORDER — DIPHENHYDRAMINE HCL 25 MG
50 CAPSULE ORAL ONCE
OUTPATIENT
Start: 2025-08-07

## 2025-07-10 RX ORDER — EPINEPHRINE 1 MG/ML
0.3 INJECTION, SOLUTION INTRAMUSCULAR; SUBCUTANEOUS EVERY 5 MIN PRN
OUTPATIENT
Start: 2025-08-07

## 2025-07-10 RX ORDER — HEPARIN SODIUM (PORCINE) LOCK FLUSH IV SOLN 100 UNIT/ML 100 UNIT/ML
5 SOLUTION INTRAVENOUS
OUTPATIENT
Start: 2025-08-07

## 2025-07-10 RX ORDER — ACETAMINOPHEN 325 MG/1
650 TABLET ORAL ONCE
Start: 2025-08-07

## 2025-07-10 RX ORDER — DIPHENHYDRAMINE HCL 25 MG
50 CAPSULE ORAL ONCE
Status: COMPLETED | OUTPATIENT
Start: 2025-07-10 | End: 2025-07-10

## 2025-07-10 RX ORDER — ALBUTEROL SULFATE 90 UG/1
1-2 INHALANT RESPIRATORY (INHALATION)
Start: 2025-08-07

## 2025-07-10 RX ORDER — HUMAN IMMUNOGLOBULIN G 20 G/200ML
0.5 LIQUID INTRAVENOUS ONCE
Status: COMPLETED | OUTPATIENT
Start: 2025-07-10 | End: 2025-07-10

## 2025-07-10 RX ORDER — HYDROCORTISONE SODIUM SUCCINATE 100 MG/2ML
100 INJECTION INTRAMUSCULAR; INTRAVENOUS ONCE
Status: COMPLETED | OUTPATIENT
Start: 2025-07-10 | End: 2025-07-10

## 2025-07-10 RX ORDER — METHYLPREDNISOLONE SODIUM SUCCINATE 40 MG/ML
40 INJECTION INTRAMUSCULAR; INTRAVENOUS
Start: 2025-08-07

## 2025-07-10 RX ORDER — DIPHENHYDRAMINE HYDROCHLORIDE 50 MG/ML
50 INJECTION, SOLUTION INTRAMUSCULAR; INTRAVENOUS
Start: 2025-08-07

## 2025-07-10 RX ORDER — ACETAMINOPHEN 325 MG/1
650 TABLET ORAL ONCE
Status: COMPLETED | OUTPATIENT
Start: 2025-07-10 | End: 2025-07-10

## 2025-07-10 RX ORDER — HYDROCORTISONE SODIUM SUCCINATE 100 MG/2ML
100 INJECTION INTRAMUSCULAR; INTRAVENOUS ONCE
OUTPATIENT
Start: 2025-08-07

## 2025-07-10 RX ORDER — DIPHENHYDRAMINE HYDROCHLORIDE 50 MG/ML
25 INJECTION, SOLUTION INTRAMUSCULAR; INTRAVENOUS
Start: 2025-08-07

## 2025-07-10 RX ADMIN — Medication 5 ML: at 10:08

## 2025-07-10 RX ADMIN — HYDROCORTISONE SODIUM SUCCINATE 100 MG: 100 INJECTION, POWDER, FOR SOLUTION INTRAMUSCULAR; INTRAVENOUS at 08:30

## 2025-07-10 RX ADMIN — DIPHENHYDRAMINE HYDROCHLORIDE 50 MG: 25 CAPSULE ORAL at 08:30

## 2025-07-10 RX ADMIN — ACETAMINOPHEN 650 MG: 325 TABLET ORAL at 08:30

## 2025-07-10 RX ADMIN — HUMAN IMMUNOGLOBULIN G 25 G: 20 LIQUID INTRAVENOUS at 08:31

## 2025-07-10 NOTE — PROGRESS NOTES
Infusion Nursing Note:  Shayy Cortez presents today for Rapid (first time) IVIG.    Patient seen by provider today: No   present during visit today: Not Applicable.    Note: Patient presents wearing a heart monitor today. She has been having heart palpitations for some time with no conclusive results so far. Patient endorses no side effects from IVIG. She states that it improved her condition symptoms and now she is at a stable level. Discussed trying rapid rates with patient today and she was agreeable.       Rapid IVIG Rates based off 52.4kg IBW: 2ml/kg/hr for 30 mins, 4ml/kg/hr for remainder  105ml/hr x 30 mins  210ml/hr x remainder      Intravenous Access:  Implanted Port.    Treatment Conditions:  Denies fever, recent illness, major or local infection, being on antibiotics, productive cough, recent surgery, or elevated temperature.       Post Infusion Assessment:  Patient tolerated infusion without incident.  Blood return noted pre and post infusion.  Site patent and intact, free from redness, edema or discomfort.  No evidence of extravasations.  Access discontinued per protocol.       [facility]:   [facility]  Patient will return 8/7/25 for next appointment.   Patient discharged in stable condition accompanied by: significant other.  Departure Mode: Ambulatory.      Liana Huynh RN

## 2025-08-04 DIAGNOSIS — G25.82 STIFF PERSON SYNDROME: ICD-10-CM

## 2025-08-04 RX ORDER — BACLOFEN 10 MG/1
10 TABLET ORAL 2 TIMES DAILY
Qty: 60 TABLET | Refills: 11 | Status: SHIPPED | OUTPATIENT
Start: 2025-08-04

## 2025-08-07 ENCOUNTER — INFUSION THERAPY VISIT (OUTPATIENT)
Dept: INFUSION THERAPY | Facility: CLINIC | Age: 65
End: 2025-08-07
Attending: PSYCHIATRY & NEUROLOGY
Payer: COMMERCIAL

## 2025-08-07 VITALS
DIASTOLIC BLOOD PRESSURE: 77 MMHG | OXYGEN SATURATION: 97 % | BODY MASS INDEX: 22.04 KG/M2 | TEMPERATURE: 97.5 F | HEART RATE: 93 BPM | WEIGHT: 124.4 LBS | SYSTOLIC BLOOD PRESSURE: 132 MMHG

## 2025-08-07 DIAGNOSIS — G25.82 STIFF-MAN SYNDROME: Primary | ICD-10-CM

## 2025-08-07 PROCEDURE — 250N000011 HC RX IP 250 OP 636: Mod: JZ | Performed by: PSYCHIATRY & NEUROLOGY

## 2025-08-07 PROCEDURE — 250N000013 HC RX MED GY IP 250 OP 250 PS 637: Performed by: PSYCHIATRY & NEUROLOGY

## 2025-08-07 RX ORDER — MEPERIDINE HYDROCHLORIDE 25 MG/ML
25 INJECTION INTRAMUSCULAR; INTRAVENOUS; SUBCUTANEOUS
OUTPATIENT
Start: 2025-09-04

## 2025-08-07 RX ORDER — ACETAMINOPHEN 325 MG/1
650 TABLET ORAL ONCE
Status: COMPLETED | OUTPATIENT
Start: 2025-08-07 | End: 2025-08-07

## 2025-08-07 RX ORDER — EPINEPHRINE 1 MG/ML
0.3 INJECTION, SOLUTION INTRAMUSCULAR; SUBCUTANEOUS EVERY 5 MIN PRN
OUTPATIENT
Start: 2025-09-04

## 2025-08-07 RX ORDER — HUMAN IMMUNOGLOBULIN G 20 G/200ML
0.5 LIQUID INTRAVENOUS ONCE
Status: COMPLETED | OUTPATIENT
Start: 2025-08-07 | End: 2025-08-07

## 2025-08-07 RX ORDER — DIPHENHYDRAMINE HCL 25 MG
50 CAPSULE ORAL ONCE
OUTPATIENT
Start: 2025-09-04

## 2025-08-07 RX ORDER — HYDROCORTISONE SODIUM SUCCINATE 100 MG/2ML
100 INJECTION INTRAMUSCULAR; INTRAVENOUS ONCE
OUTPATIENT
Start: 2025-09-04

## 2025-08-07 RX ORDER — ACETAMINOPHEN 325 MG/1
650 TABLET ORAL ONCE
Start: 2025-09-04

## 2025-08-07 RX ORDER — DIPHENHYDRAMINE HYDROCHLORIDE 50 MG/ML
50 INJECTION, SOLUTION INTRAMUSCULAR; INTRAVENOUS
Start: 2025-09-04

## 2025-08-07 RX ORDER — HUMAN IMMUNOGLOBULIN G 20 G/200ML
0.5 LIQUID INTRAVENOUS ONCE
Start: 2025-09-04 | End: 2025-09-04

## 2025-08-07 RX ORDER — DIPHENHYDRAMINE HYDROCHLORIDE 50 MG/ML
25 INJECTION, SOLUTION INTRAMUSCULAR; INTRAVENOUS
Start: 2025-09-04

## 2025-08-07 RX ORDER — HEPARIN SODIUM,PORCINE 10 UNIT/ML
5-20 VIAL (ML) INTRAVENOUS DAILY PRN
OUTPATIENT
Start: 2025-09-04

## 2025-08-07 RX ORDER — DIPHENHYDRAMINE HCL 25 MG
50 CAPSULE ORAL ONCE
Status: COMPLETED | OUTPATIENT
Start: 2025-08-07 | End: 2025-08-07

## 2025-08-07 RX ORDER — HEPARIN SODIUM (PORCINE) LOCK FLUSH IV SOLN 100 UNIT/ML 100 UNIT/ML
5 SOLUTION INTRAVENOUS
OUTPATIENT
Start: 2025-09-04

## 2025-08-07 RX ORDER — ALBUTEROL SULFATE 0.83 MG/ML
2.5 SOLUTION RESPIRATORY (INHALATION)
OUTPATIENT
Start: 2025-09-04

## 2025-08-07 RX ORDER — HYDROCORTISONE SODIUM SUCCINATE 100 MG/2ML
100 INJECTION INTRAMUSCULAR; INTRAVENOUS ONCE
Status: COMPLETED | OUTPATIENT
Start: 2025-08-07 | End: 2025-08-07

## 2025-08-07 RX ORDER — METHYLPREDNISOLONE SODIUM SUCCINATE 40 MG/ML
40 INJECTION INTRAMUSCULAR; INTRAVENOUS
Start: 2025-09-04

## 2025-08-07 RX ORDER — HEPARIN SODIUM (PORCINE) LOCK FLUSH IV SOLN 100 UNIT/ML 100 UNIT/ML
5 SOLUTION INTRAVENOUS
Status: DISCONTINUED | OUTPATIENT
Start: 2025-08-07 | End: 2025-08-07 | Stop reason: HOSPADM

## 2025-08-07 RX ORDER — ALBUTEROL SULFATE 90 UG/1
1-2 INHALANT RESPIRATORY (INHALATION)
Start: 2025-09-04

## 2025-08-07 RX ADMIN — DIPHENHYDRAMINE HYDROCHLORIDE 50 MG: 25 CAPSULE ORAL at 11:34

## 2025-08-07 RX ADMIN — HYDROCORTISONE SODIUM SUCCINATE 100 MG: 100 INJECTION, POWDER, FOR SOLUTION INTRAMUSCULAR; INTRAVENOUS at 11:52

## 2025-08-07 RX ADMIN — Medication 5 ML: at 13:34

## 2025-08-07 RX ADMIN — ACETAMINOPHEN 650 MG: 325 TABLET ORAL at 11:34

## 2025-08-07 RX ADMIN — HUMAN IMMUNOGLOBULIN G 25 G: 5 LIQUID INTRAVENOUS at 11:56

## 2025-08-07 ASSESSMENT — PAIN SCALES - GENERAL: PAINLEVEL_OUTOF10: NO PAIN (0)
